# Patient Record
Sex: FEMALE | Race: WHITE | NOT HISPANIC OR LATINO | Employment: OTHER | URBAN - METROPOLITAN AREA
[De-identification: names, ages, dates, MRNs, and addresses within clinical notes are randomized per-mention and may not be internally consistent; named-entity substitution may affect disease eponyms.]

---

## 2017-01-05 ENCOUNTER — APPOINTMENT (OUTPATIENT)
Dept: LAB | Facility: MEDICAL CENTER | Age: 82
End: 2017-01-05
Payer: MEDICARE

## 2017-01-05 ENCOUNTER — TRANSCRIBE ORDERS (OUTPATIENT)
Dept: ADMINISTRATIVE | Facility: HOSPITAL | Age: 82
End: 2017-01-05

## 2017-01-05 DIAGNOSIS — C50.111 MALIGNANT NEOPLASM OF CENTRAL PORTION OF RIGHT FEMALE BREAST (HCC): Primary | ICD-10-CM

## 2017-01-05 DIAGNOSIS — C50.111 MALIGNANT NEOPLASM OF CENTRAL PORTION OF RIGHT FEMALE BREAST (HCC): ICD-10-CM

## 2017-01-05 LAB
ALBUMIN SERPL BCP-MCNC: 3.3 G/DL (ref 3.5–5)
ALP SERPL-CCNC: 84 U/L (ref 46–116)
ALT SERPL W P-5'-P-CCNC: 24 U/L (ref 12–78)
ANION GAP SERPL CALCULATED.3IONS-SCNC: 9 MMOL/L (ref 4–13)
AST SERPL W P-5'-P-CCNC: 24 U/L (ref 5–45)
BASOPHILS # BLD AUTO: 0.01 THOUSANDS/ΜL (ref 0–0.1)
BASOPHILS NFR BLD AUTO: 0 % (ref 0–1)
BILIRUB SERPL-MCNC: 0.26 MG/DL (ref 0.2–1)
BUN SERPL-MCNC: 31 MG/DL (ref 5–25)
CALCIUM SERPL-MCNC: 9.6 MG/DL (ref 8.3–10.1)
CANCER AG27-29 SERPL-ACNC: 532.1 U/ML (ref 0–42.3)
CHLORIDE SERPL-SCNC: 102 MMOL/L (ref 100–108)
CO2 SERPL-SCNC: 27 MMOL/L (ref 21–32)
CREAT SERPL-MCNC: 1.83 MG/DL (ref 0.6–1.3)
EOSINOPHIL # BLD AUTO: 0.08 THOUSAND/ΜL (ref 0–0.61)
EOSINOPHIL NFR BLD AUTO: 1 % (ref 0–6)
ERYTHROCYTE [DISTWIDTH] IN BLOOD BY AUTOMATED COUNT: 13.3 % (ref 11.6–15.1)
GFR SERPL CREATININE-BSD FRML MDRD: 26 ML/MIN/1.73SQ M
GLUCOSE SERPL-MCNC: 248 MG/DL (ref 65–140)
HCT VFR BLD AUTO: 38.8 % (ref 34.8–46.1)
HGB BLD-MCNC: 12.4 G/DL (ref 11.5–15.4)
LYMPHOCYTES # BLD AUTO: 1.59 THOUSANDS/ΜL (ref 0.6–4.47)
LYMPHOCYTES NFR BLD AUTO: 25 % (ref 14–44)
MCH RBC QN AUTO: 29.8 PG (ref 26.8–34.3)
MCHC RBC AUTO-ENTMCNC: 32 G/DL (ref 31.4–37.4)
MCV RBC AUTO: 93 FL (ref 82–98)
MONOCYTES # BLD AUTO: 0.65 THOUSAND/ΜL (ref 0.17–1.22)
MONOCYTES NFR BLD AUTO: 10 % (ref 4–12)
NEUTROPHILS # BLD AUTO: 4.12 THOUSANDS/ΜL (ref 1.85–7.62)
NEUTS SEG NFR BLD AUTO: 64 % (ref 43–75)
NRBC BLD AUTO-RTO: 0 /100 WBCS
PLATELET # BLD AUTO: 237 THOUSANDS/UL (ref 149–390)
PMV BLD AUTO: 10.2 FL (ref 8.9–12.7)
POTASSIUM SERPL-SCNC: 4.7 MMOL/L (ref 3.5–5.3)
PROT SERPL-MCNC: 7.3 G/DL (ref 6.4–8.2)
RBC # BLD AUTO: 4.16 MILLION/UL (ref 3.81–5.12)
SODIUM SERPL-SCNC: 138 MMOL/L (ref 136–145)
WBC # BLD AUTO: 6.49 THOUSAND/UL (ref 4.31–10.16)

## 2017-01-05 PROCEDURE — 36415 COLL VENOUS BLD VENIPUNCTURE: CPT

## 2017-01-05 PROCEDURE — 80053 COMPREHEN METABOLIC PANEL: CPT

## 2017-01-05 PROCEDURE — 86300 IMMUNOASSAY TUMOR CA 15-3: CPT

## 2017-01-05 PROCEDURE — 85025 COMPLETE CBC W/AUTO DIFF WBC: CPT

## 2017-01-10 ENCOUNTER — ALLSCRIPTS OFFICE VISIT (OUTPATIENT)
Dept: OTHER | Facility: OTHER | Age: 82
End: 2017-01-10

## 2017-01-10 ENCOUNTER — HOSPITAL ENCOUNTER (OUTPATIENT)
Dept: INFUSION CENTER | Facility: CLINIC | Age: 82
Discharge: HOME | End: 2017-01-10
Payer: MEDICARE

## 2017-01-10 DIAGNOSIS — C50.111 MALIGNANT NEOPLASM OF CENTRAL PORTION OF RIGHT FEMALE BREAST (HCC): ICD-10-CM

## 2017-01-10 PROCEDURE — 96401 CHEMO ANTI-NEOPL SQ/IM: CPT

## 2017-01-10 RX ADMIN — DENOSUMAB 120 MG: 120 INJECTION SUBCUTANEOUS at 14:59

## 2017-02-01 ENCOUNTER — HOSPITAL ENCOUNTER (OUTPATIENT)
Dept: RADIOLOGY | Age: 82
Discharge: HOME | End: 2017-02-01
Payer: MEDICARE

## 2017-02-01 DIAGNOSIS — C50.111 MALIGNANT NEOPLASM OF CENTRAL PORTION OF RIGHT FEMALE BREAST (HCC): ICD-10-CM

## 2017-02-01 PROCEDURE — 82948 REAGENT STRIP/BLOOD GLUCOSE: CPT

## 2017-02-01 PROCEDURE — A9552 F18 FDG: HCPCS

## 2017-02-01 PROCEDURE — 78815 PET IMAGE W/CT SKULL-THIGH: CPT

## 2017-02-01 RX ADMIN — IOHEXOL 7.5 ML: 240 INJECTION, SOLUTION INTRATHECAL; INTRAVASCULAR; INTRAVENOUS; ORAL at 08:05

## 2017-02-03 LAB — GLUCOSE SERPL-MCNC: 69 MG/DL (ref 65–140)

## 2017-02-10 ENCOUNTER — HOSPITAL ENCOUNTER (OUTPATIENT)
Dept: INFUSION CENTER | Facility: CLINIC | Age: 82
Discharge: HOME | End: 2017-02-10
Payer: MEDICARE

## 2017-02-10 PROCEDURE — 96401 CHEMO ANTI-NEOPL SQ/IM: CPT

## 2017-02-10 RX ADMIN — DENOSUMAB 120 MG: 120 INJECTION SUBCUTANEOUS at 14:52

## 2017-02-10 NOTE — PROGRESS NOTES
Xgeva given in [facility]  Ca=9 3 on 2/8/17  Pt brought in copy of lab work from [employer]  Offers no complaints   Left ambulatory in stable condition

## 2017-03-07 ENCOUNTER — ALLSCRIPTS OFFICE VISIT (OUTPATIENT)
Dept: OTHER | Facility: OTHER | Age: 82
End: 2017-03-07

## 2017-03-07 ENCOUNTER — HOSPITAL ENCOUNTER (OUTPATIENT)
Dept: INFUSION CENTER | Facility: CLINIC | Age: 82
Discharge: HOME | End: 2017-03-07
Payer: MEDICARE

## 2017-03-07 DIAGNOSIS — C50.111 MALIGNANT NEOPLASM OF CENTRAL PORTION OF RIGHT FEMALE BREAST (HCC): ICD-10-CM

## 2017-03-07 PROCEDURE — 96401 CHEMO ANTI-NEOPL SQ/IM: CPT

## 2017-03-07 RX ADMIN — DENOSUMAB 120 MG: 120 INJECTION SUBCUTANEOUS at 15:47

## 2017-03-07 NOTE — PROGRESS NOTES
Pt offers no complaints today  Ca level 9 1 from 2/22/17  Xegeva 120mg given in right arm, band aid applied   Has schedule for next injection set up, declines on AVS

## 2017-03-07 NOTE — PLAN OF CARE
Problem: Potential for Falls  Goal: Patient will remain free of falls  INTERVENTIONS:  - Assess patient frequently for physical needs  - Identify cognitive and physical deficits and behaviors that affect risk of falls    - [place] fall precautions as indicated by assessment   - Educate patient/family on patient safety including physical limitations  - Instruct patient to call for assistance with activity based on assessment  - Modify environment to reduce risk of injury  - Consider OT/PT consult to assist with strengthening/mobility   Outcome: Progressing

## 2017-03-28 ENCOUNTER — HOSPITAL ENCOUNTER (EMERGENCY)
Facility: HOSPITAL | Age: 82
Discharge: HOME | End: 2017-03-28
Attending: EMERGENCY MEDICINE | Admitting: EMERGENCY MEDICINE
Payer: MEDICARE

## 2017-03-28 ENCOUNTER — APPOINTMENT (EMERGENCY)
Dept: RADIOLOGY | Facility: HOSPITAL | Age: 82
End: 2017-03-28
Payer: MEDICARE

## 2017-03-28 ENCOUNTER — APPOINTMENT (EMERGENCY)
Dept: CT IMAGING | Facility: HOSPITAL | Age: 82
End: 2017-03-28
Payer: MEDICARE

## 2017-03-28 VITALS
TEMPERATURE: 97.5 F | OXYGEN SATURATION: 100 % | WEIGHT: 184.97 LBS | DIASTOLIC BLOOD PRESSURE: 90 MMHG | HEART RATE: 94 BPM | SYSTOLIC BLOOD PRESSURE: 192 MMHG | RESPIRATION RATE: 16 BRPM | BODY MASS INDEX: 34.95 KG/M2

## 2017-03-28 DIAGNOSIS — R79.89 RAISED TSH LEVEL: ICD-10-CM

## 2017-03-28 DIAGNOSIS — N39.0 UTI (URINARY TRACT INFECTION): ICD-10-CM

## 2017-03-28 DIAGNOSIS — E16.2 HYPOGLYCEMIA: Primary | ICD-10-CM

## 2017-03-28 LAB
ALBUMIN SERPL BCP-MCNC: 3.3 G/DL (ref 3.5–5)
ALP SERPL-CCNC: 98 U/L (ref 46–116)
ALT SERPL W P-5'-P-CCNC: 33 U/L (ref 12–78)
ANION GAP SERPL CALCULATED.3IONS-SCNC: 11 MMOL/L (ref 4–13)
AST SERPL W P-5'-P-CCNC: 72 U/L (ref 5–45)
ATRIAL RATE: 92 BPM
BACTERIA UR QL AUTO: ABNORMAL /HPF
BASOPHILS # BLD AUTO: 0.02 THOUSANDS/ΜL (ref 0–0.1)
BASOPHILS NFR BLD AUTO: 0 % (ref 0–1)
BILIRUB SERPL-MCNC: 0.2 MG/DL (ref 0.2–1)
BILIRUB UR QL STRIP: NEGATIVE
BUN SERPL-MCNC: 31 MG/DL (ref 5–25)
CALCIUM SERPL-MCNC: 9.3 MG/DL (ref 8.3–10.1)
CHLORIDE SERPL-SCNC: 103 MMOL/L (ref 100–108)
CLARITY UR: ABNORMAL
CO2 SERPL-SCNC: 27 MMOL/L (ref 21–32)
COLOR UR: YELLOW
CREAT SERPL-MCNC: 1.87 MG/DL (ref 0.6–1.3)
EOSINOPHIL # BLD AUTO: 0.08 THOUSAND/ΜL (ref 0–0.61)
EOSINOPHIL NFR BLD AUTO: 1 % (ref 0–6)
ERYTHROCYTE [DISTWIDTH] IN BLOOD BY AUTOMATED COUNT: 13.8 % (ref 11.6–15.1)
GFR SERPL CREATININE-BSD FRML MDRD: 25.4 ML/MIN/1.73SQ M
GLUCOSE SERPL-MCNC: 152 MG/DL (ref 65–140)
GLUCOSE SERPL-MCNC: 156 MG/DL (ref 65–140)
GLUCOSE SERPL-MCNC: 166 MG/DL (ref 65–140)
GLUCOSE UR STRIP-MCNC: NEGATIVE MG/DL
HCT VFR BLD AUTO: 38.1 % (ref 34.8–46.1)
HGB BLD-MCNC: 12 G/DL (ref 11.5–15.4)
HGB UR QL STRIP.AUTO: ABNORMAL
KETONES UR STRIP-MCNC: NEGATIVE MG/DL
LACTATE SERPL-SCNC: 1.4 MMOL/L (ref 0.5–2)
LEUKOCYTE ESTERASE UR QL STRIP: NEGATIVE
LYMPHOCYTES # BLD AUTO: 1.12 THOUSANDS/ΜL (ref 0.6–4.47)
LYMPHOCYTES NFR BLD AUTO: 12 % (ref 14–44)
MCH RBC QN AUTO: 29.3 PG (ref 26.8–34.3)
MCHC RBC AUTO-ENTMCNC: 31.5 G/DL (ref 31.4–37.4)
MCV RBC AUTO: 93 FL (ref 82–98)
MONOCYTES # BLD AUTO: 0.76 THOUSAND/ΜL (ref 0.17–1.22)
MONOCYTES NFR BLD AUTO: 8 % (ref 4–12)
NEUTROPHILS # BLD AUTO: 7.28 THOUSANDS/ΜL (ref 1.85–7.62)
NEUTS SEG NFR BLD AUTO: 79 % (ref 43–75)
NITRITE UR QL STRIP: NEGATIVE
NON-SQ EPI CELLS URNS QL MICRO: ABNORMAL /HPF
P AXIS: 61 DEGREES
PH UR STRIP.AUTO: 6.5 [PH] (ref 4.5–8)
PLATELET # BLD AUTO: 212 THOUSANDS/UL (ref 149–390)
PMV BLD AUTO: 9 FL (ref 8.9–12.7)
POTASSIUM SERPL-SCNC: 4.1 MMOL/L (ref 3.5–5.3)
PR INTERVAL: 148 MS
PROT SERPL-MCNC: 7.5 G/DL (ref 6.4–8.2)
PROT UR STRIP-MCNC: ABNORMAL MG/DL
QRS AXIS: -17 DEGREES
QRSD INTERVAL: 74 MS
QT INTERVAL: 348 MS
QTC INTERVAL: 430 MS
RBC # BLD AUTO: 4.1 MILLION/UL (ref 3.81–5.12)
RBC #/AREA URNS AUTO: ABNORMAL /HPF
SODIUM SERPL-SCNC: 141 MMOL/L (ref 136–145)
SP GR UR STRIP.AUTO: 1.01 (ref 1–1.03)
T WAVE AXIS: 29 DEGREES
TROPONIN I SERPL-MCNC: <0.02 NG/ML
TSH SERPL DL<=0.05 MIU/L-ACNC: 5.93 UIU/ML (ref 0.36–3.74)
UROBILINOGEN UR QL STRIP.AUTO: 0.2 E.U./DL
VENTRICULAR RATE: 92 BPM
WBC # BLD AUTO: 9.26 THOUSAND/UL (ref 4.31–10.16)
WBC #/AREA URNS AUTO: ABNORMAL /HPF

## 2017-03-28 PROCEDURE — 93005 ELECTROCARDIOGRAM TRACING: CPT | Performed by: EMERGENCY MEDICINE

## 2017-03-28 PROCEDURE — 96361 HYDRATE IV INFUSION ADD-ON: CPT

## 2017-03-28 PROCEDURE — 71010 HB CHEST X-RAY 1 VIEW FRONTAL (PORTABLE): CPT

## 2017-03-28 PROCEDURE — 84484 ASSAY OF TROPONIN QUANT: CPT | Performed by: EMERGENCY MEDICINE

## 2017-03-28 PROCEDURE — 81001 URINALYSIS AUTO W/SCOPE: CPT | Performed by: EMERGENCY MEDICINE

## 2017-03-28 PROCEDURE — 99285 EMERGENCY DEPT VISIT HI MDM: CPT

## 2017-03-28 PROCEDURE — 70450 CT HEAD/BRAIN W/O DYE: CPT

## 2017-03-28 PROCEDURE — 36415 COLL VENOUS BLD VENIPUNCTURE: CPT | Performed by: EMERGENCY MEDICINE

## 2017-03-28 PROCEDURE — 82948 REAGENT STRIP/BLOOD GLUCOSE: CPT

## 2017-03-28 PROCEDURE — 96360 HYDRATION IV INFUSION INIT: CPT

## 2017-03-28 PROCEDURE — 87086 URINE CULTURE/COLONY COUNT: CPT | Performed by: EMERGENCY MEDICINE

## 2017-03-28 PROCEDURE — 80053 COMPREHEN METABOLIC PANEL: CPT | Performed by: EMERGENCY MEDICINE

## 2017-03-28 PROCEDURE — 83605 ASSAY OF LACTIC ACID: CPT | Performed by: EMERGENCY MEDICINE

## 2017-03-28 PROCEDURE — 84443 ASSAY THYROID STIM HORMONE: CPT | Performed by: EMERGENCY MEDICINE

## 2017-03-28 PROCEDURE — 85025 COMPLETE CBC W/AUTO DIFF WBC: CPT | Performed by: EMERGENCY MEDICINE

## 2017-03-28 RX ORDER — SULFAMETHOXAZOLE AND TRIMETHOPRIM 800; 160 MG/1; MG/1
1 TABLET ORAL ONCE
Status: COMPLETED | OUTPATIENT
Start: 2017-03-28 | End: 2017-03-28

## 2017-03-28 RX ORDER — ONDANSETRON 4 MG/1
4 TABLET, FILM COATED ORAL EVERY 8 HOURS PRN
Qty: 15 TABLET | Refills: 0 | Status: SHIPPED | OUTPATIENT
Start: 2017-03-28 | End: 2018-03-18

## 2017-03-28 RX ORDER — SULFAMETHOXAZOLE AND TRIMETHOPRIM 800; 160 MG/1; MG/1
1 TABLET ORAL 2 TIMES DAILY
Qty: 14 TABLET | Refills: 0 | Status: SHIPPED | OUTPATIENT
Start: 2017-03-28 | End: 2017-04-04

## 2017-03-28 RX ORDER — DEXTROSE AND SODIUM CHLORIDE 5; .45 G/100ML; G/100ML
125 INJECTION, SOLUTION INTRAVENOUS CONTINUOUS
Status: DISCONTINUED | OUTPATIENT
Start: 2017-03-28 | End: 2017-03-28 | Stop reason: HOSPADM

## 2017-03-28 RX ADMIN — DEXTROSE AND SODIUM CHLORIDE 125 ML/HR: 5; .45 INJECTION, SOLUTION INTRAVENOUS at 06:50

## 2017-03-28 RX ADMIN — SULFAMETHOXAZOLE AND TRIMETHOPRIM 1 TABLET: 800; 160 TABLET ORAL at 08:34

## 2017-03-30 ENCOUNTER — HOSPITAL ENCOUNTER (EMERGENCY)
Facility: HOSPITAL | Age: 82
Discharge: HOME | End: 2017-03-30
Attending: EMERGENCY MEDICINE | Admitting: EMERGENCY MEDICINE
Payer: MEDICARE

## 2017-03-30 VITALS
SYSTOLIC BLOOD PRESSURE: 142 MMHG | RESPIRATION RATE: 16 BRPM | OXYGEN SATURATION: 97 % | HEART RATE: 78 BPM | WEIGHT: 186.51 LBS | BODY MASS INDEX: 35.24 KG/M2 | DIASTOLIC BLOOD PRESSURE: 98 MMHG | TEMPERATURE: 97.4 F

## 2017-03-30 DIAGNOSIS — E16.2 HYPOGLYCEMIA: Primary | ICD-10-CM

## 2017-03-30 LAB
ANION GAP SERPL CALCULATED.3IONS-SCNC: 10 MMOL/L (ref 4–13)
BACTERIA UR CULT: NORMAL
BASOPHILS # BLD MANUAL: 0 THOUSAND/UL (ref 0–0.1)
BASOPHILS NFR MAR MANUAL: 0 % (ref 0–1)
BUN SERPL-MCNC: 31 MG/DL (ref 5–25)
CALCIUM SERPL-MCNC: 9 MG/DL (ref 8.3–10.1)
CHLORIDE SERPL-SCNC: 103 MMOL/L (ref 100–108)
CO2 SERPL-SCNC: 28 MMOL/L (ref 21–32)
CREAT SERPL-MCNC: 2.44 MG/DL (ref 0.6–1.3)
EOSINOPHIL # BLD MANUAL: 0.23 THOUSAND/UL (ref 0–0.4)
EOSINOPHIL NFR BLD MANUAL: 4 % (ref 0–6)
ERYTHROCYTE [DISTWIDTH] IN BLOOD BY AUTOMATED COUNT: 13.9 % (ref 11.6–15.1)
GFR SERPL CREATININE-BSD FRML MDRD: 18.6 ML/MIN/1.73SQ M
GLUCOSE SERPL-MCNC: 102 MG/DL (ref 65–140)
GLUCOSE SERPL-MCNC: 125 MG/DL (ref 65–140)
GLUCOSE SERPL-MCNC: 138 MG/DL (ref 65–140)
GLUCOSE SERPL-MCNC: 179 MG/DL (ref 65–140)
HCT VFR BLD AUTO: 38 % (ref 34.8–46.1)
HGB BLD-MCNC: 12 G/DL (ref 11.5–15.4)
LYMPHOCYTES # BLD AUTO: 1.04 THOUSAND/UL (ref 0.6–4.47)
LYMPHOCYTES # BLD AUTO: 18 % (ref 14–44)
MCH RBC QN AUTO: 29.3 PG (ref 26.8–34.3)
MCHC RBC AUTO-ENTMCNC: 31.6 G/DL (ref 31.4–37.4)
MCV RBC AUTO: 93 FL (ref 82–98)
MONOCYTES # BLD AUTO: 0.63 THOUSAND/UL (ref 0–1.22)
MONOCYTES NFR BLD: 11 % (ref 4–12)
NEUTROPHILS # BLD MANUAL: 3.68 THOUSAND/UL (ref 1.85–7.62)
NEUTS BAND NFR BLD MANUAL: 2 % (ref 0–8)
NEUTS SEG NFR BLD AUTO: 62 % (ref 43–75)
PLATELET # BLD AUTO: 200 THOUSANDS/UL (ref 149–390)
PLATELET BLD QL SMEAR: ADEQUATE
PMV BLD AUTO: 8.7 FL (ref 8.9–12.7)
POTASSIUM SERPL-SCNC: 4.3 MMOL/L (ref 3.5–5.3)
RBC # BLD AUTO: 4.1 MILLION/UL (ref 3.81–5.12)
SODIUM SERPL-SCNC: 141 MMOL/L (ref 136–145)
TOTAL CELLS COUNTED SPEC: 100
VARIANT LYMPHS # BLD AUTO: 3 %
WBC # BLD AUTO: 5.75 THOUSAND/UL (ref 4.31–10.16)

## 2017-03-30 PROCEDURE — 85007 BL SMEAR W/DIFF WBC COUNT: CPT | Performed by: EMERGENCY MEDICINE

## 2017-03-30 PROCEDURE — 99285 EMERGENCY DEPT VISIT HI MDM: CPT

## 2017-03-30 PROCEDURE — 85027 COMPLETE CBC AUTOMATED: CPT | Performed by: EMERGENCY MEDICINE

## 2017-03-30 PROCEDURE — 96361 HYDRATE IV INFUSION ADD-ON: CPT

## 2017-03-30 PROCEDURE — 96360 HYDRATION IV INFUSION INIT: CPT

## 2017-03-30 PROCEDURE — 82948 REAGENT STRIP/BLOOD GLUCOSE: CPT

## 2017-03-30 PROCEDURE — 36415 COLL VENOUS BLD VENIPUNCTURE: CPT | Performed by: EMERGENCY MEDICINE

## 2017-03-30 PROCEDURE — 80048 BASIC METABOLIC PNL TOTAL CA: CPT | Performed by: EMERGENCY MEDICINE

## 2017-03-30 RX ORDER — LETROZOLE 2.5 MG/1
2.5 TABLET, FILM COATED ORAL DAILY
COMMUNITY
End: 2018-03-18

## 2017-03-30 RX ORDER — AMLODIPINE BESYLATE 2.5 MG/1
2.5 TABLET ORAL ONCE
Status: COMPLETED | OUTPATIENT
Start: 2017-03-30 | End: 2017-03-30

## 2017-03-30 RX ORDER — SULFAMETHOXAZOLE AND TRIMETHOPRIM 800; 160 MG/1; MG/1
1 TABLET ORAL ONCE
Status: COMPLETED | OUTPATIENT
Start: 2017-03-30 | End: 2017-03-30

## 2017-03-30 RX ORDER — FERROUS SULFATE 325(65) MG
325 TABLET ORAL
COMMUNITY
End: 2018-03-18

## 2017-03-30 RX ORDER — INSULIN GLARGINE 100 [IU]/ML
50 INJECTION, SOLUTION SUBCUTANEOUS
Status: ON HOLD | COMMUNITY
End: 2018-03-20

## 2017-03-30 RX ADMIN — SULFAMETHOXAZOLE AND TRIMETHOPRIM 1 TABLET: 800; 160 TABLET ORAL at 09:30

## 2017-03-30 RX ADMIN — SODIUM CHLORIDE 1000 ML: 0.9 INJECTION, SOLUTION INTRAVENOUS at 08:52

## 2017-03-30 RX ADMIN — AMLODIPINE BESYLATE 2.5 MG: 2.5 TABLET ORAL at 09:30

## 2017-04-04 ENCOUNTER — HOSPITAL ENCOUNTER (OUTPATIENT)
Dept: INFUSION CENTER | Facility: CLINIC | Age: 82
Discharge: HOME | End: 2017-04-04
Payer: MEDICARE

## 2017-04-04 PROCEDURE — 96401 CHEMO ANTI-NEOPL SQ/IM: CPT

## 2017-04-04 RX ADMIN — DENOSUMAB 120 MG: 120 INJECTION SUBCUTANEOUS at 15:10

## 2017-04-04 NOTE — PLAN OF CARE
Problem: Potential for Falls  Goal: Patient will remain free of falls  INTERVENTIONS:  - Assess patient frequently for physical needs  - Identify cognitive and physical deficits and behaviors that affect risk of falls    - [place] fall precautions as indicated by assessment   - Educate patient/family on patient safety including physical limitations  - Instruct patient to call for assistance with activity based on assessment  - Modify environment to reduce risk of injury  - Consider OT/PT consult to assist with strengthening/mobility   Outcome: Progressing

## 2017-04-04 NOTE — PROGRESS NOTES
Pt  Tolerated Xgeva Sq injection in EDDIE w/out adverse reaction  Confirmed pts  Next appt   Pt  Declined AVS

## 2017-05-02 ENCOUNTER — ALLSCRIPTS OFFICE VISIT (OUTPATIENT)
Dept: OTHER | Facility: OTHER | Age: 82
End: 2017-05-02

## 2017-05-02 ENCOUNTER — APPOINTMENT (OUTPATIENT)
Dept: LAB | Facility: MEDICAL CENTER | Age: 82
End: 2017-05-02
Payer: MEDICARE

## 2017-05-02 ENCOUNTER — HOSPITAL ENCOUNTER (OUTPATIENT)
Dept: INFUSION CENTER | Facility: CLINIC | Age: 82
Discharge: HOME | End: 2017-05-02
Payer: MEDICARE

## 2017-05-02 DIAGNOSIS — C79.51 SECONDARY MALIGNANT NEOPLASM OF BONE (HCC): ICD-10-CM

## 2017-05-02 DIAGNOSIS — C50.111 MALIGNANT NEOPLASM OF CENTRAL PORTION OF RIGHT FEMALE BREAST (HCC): ICD-10-CM

## 2017-05-02 PROCEDURE — 86300 IMMUNOASSAY TUMOR CA 15-3: CPT

## 2017-05-02 PROCEDURE — 96401 CHEMO ANTI-NEOPL SQ/IM: CPT

## 2017-05-02 PROCEDURE — 36415 COLL VENOUS BLD VENIPUNCTURE: CPT

## 2017-05-02 RX ADMIN — DENOSUMAB 120 MG: 120 INJECTION SUBCUTANEOUS at 15:04

## 2017-05-02 NOTE — PROGRESS NOTES
Pt offers no complaints, labs within parameters for xgeva today, verified pt taking po calcium at home  Xgeav tolerated in left arm, pt to return as scheduled    Declined AVS

## 2017-05-03 LAB — CANCER AG27-29 SERPL-ACNC: >450 U/ML (ref 0–42.3)

## 2017-05-30 ENCOUNTER — HOSPITAL ENCOUNTER (OUTPATIENT)
Dept: INFUSION CENTER | Facility: CLINIC | Age: 82
Discharge: HOME | End: 2017-05-30
Payer: MEDICARE

## 2017-05-30 PROCEDURE — 96401 CHEMO ANTI-NEOPL SQ/IM: CPT

## 2017-05-30 RX ADMIN — DENOSUMAB 120 MG: 120 INJECTION SUBCUTANEOUS at 15:27

## 2017-05-30 NOTE — PROGRESS NOTES
Pt  Denies new symptoms or concerns at this time  Pt  States she had labs completed last Wednesday  Received Labs from [facility]  Calcium 9 1  Xgeva ordered today

## 2017-05-30 NOTE — PLAN OF CARE
Problem: PAIN - ADULT  Goal: Verbalizes/displays adequate comfort level or baseline comfort level  Interventions:  - Encourage patient to monitor pain and request assistance  - Assess pain using appropriate pain scale  - Administer analgesics based on type and severity of pain and evaluate response  - Implement non-pharmacological measures as appropriate and evaluate response  - Consider cultural and social influences on pain and pain management  - Notify physician/advanced practitioner if interventions unsuccessful or patient reports new pain  Outcome: Progressing    Problem: INFECTION - ADULT  Goal: Absence or prevention of progression during hospitalization  INTERVENTIONS:  - Assess and monitor for signs and symptoms of infection  - Monitor lab/diagnostic results  - Monitor all insertion sites, i e  indwelling lines, tubes, and drains  - Monitor endotracheal (as able) and nasal secretions for changes in amount and color  - [place] appropriate cooling/warming therapies per order  - Administer medications as ordered  - Instruct and encourage patient and family to use good hand hygiene technique  - Identify and instruct in appropriate isolation precautions for identified infection/condition  Outcome: Progressing  Goal: Absence of fever/infection during neutropenic period  INTERVENTIONS:  - Monitor WBC  - Implement neutropenic guidelines  Outcome: Progressing    Problem: Knowledge Deficit  Goal: Patient/family/caregiver demonstrates understanding of disease process, treatment plan, medications, and discharge instructions  Complete learning assessment and assess knowledge base    Interventions:  - Provide teaching at level of understanding  - Provide teaching via preferred learning methods  Outcome: Progressing

## 2017-05-31 ENCOUNTER — HOSPITAL ENCOUNTER (OUTPATIENT)
Dept: RADIOLOGY | Age: 82
Discharge: HOME | End: 2017-05-31
Payer: MEDICARE

## 2017-05-31 DIAGNOSIS — C50.111 MALIGNANT NEOPLASM OF CENTRAL PORTION OF RIGHT FEMALE BREAST (HCC): ICD-10-CM

## 2017-05-31 DIAGNOSIS — C79.51 SECONDARY MALIGNANT NEOPLASM OF BONE (HCC): ICD-10-CM

## 2017-05-31 LAB
GLUCOSE SERPL-MCNC: 74 MG/DL (ref 65–140)
GLUCOSE SERPL-MCNC: 87 MG/DL (ref 65–140)

## 2017-05-31 PROCEDURE — 82948 REAGENT STRIP/BLOOD GLUCOSE: CPT

## 2017-05-31 PROCEDURE — 78815 PET IMAGE W/CT SKULL-THIGH: CPT

## 2017-05-31 PROCEDURE — A9552 F18 FDG: HCPCS

## 2017-06-08 ENCOUNTER — ALLSCRIPTS OFFICE VISIT (OUTPATIENT)
Dept: OTHER | Facility: OTHER | Age: 82
End: 2017-06-08

## 2017-06-08 DIAGNOSIS — S89.90XA INJURY OF LOWER LEG: ICD-10-CM

## 2017-06-08 DIAGNOSIS — D64.9 ANEMIA: ICD-10-CM

## 2017-06-08 DIAGNOSIS — C78.7 SECONDARY MALIGNANT NEOPLASM OF LIVER AND INTRAHEPATIC BILE DUCT (HCC): ICD-10-CM

## 2017-06-08 DIAGNOSIS — D53.9 NUTRITIONAL ANEMIA: ICD-10-CM

## 2017-06-08 DIAGNOSIS — C50.111 MALIGNANT NEOPLASM OF CENTRAL PORTION OF RIGHT FEMALE BREAST (HCC): ICD-10-CM

## 2017-06-09 ENCOUNTER — APPOINTMENT (OUTPATIENT)
Dept: LAB | Facility: MEDICAL CENTER | Age: 82
End: 2017-06-09
Payer: MEDICARE

## 2017-06-09 DIAGNOSIS — C79.51 SECONDARY MALIGNANT NEOPLASM OF BONE (HCC): ICD-10-CM

## 2017-06-09 DIAGNOSIS — C50.111 MALIGNANT NEOPLASM OF CENTRAL PORTION OF RIGHT FEMALE BREAST (HCC): ICD-10-CM

## 2017-06-09 LAB
ANISOCYTOSIS BLD QL SMEAR: PRESENT
BASOPHILS # BLD MANUAL: 0 THOUSAND/UL (ref 0–0.1)
BASOPHILS NFR MAR MANUAL: 0 % (ref 0–1)
EOSINOPHIL # BLD MANUAL: 0.2 THOUSAND/UL (ref 0–0.4)
EOSINOPHIL NFR BLD MANUAL: 3 % (ref 0–6)
ERYTHROCYTE [DISTWIDTH] IN BLOOD BY AUTOMATED COUNT: 16.1 % (ref 11.6–15.1)
HCT VFR BLD AUTO: 31.5 % (ref 34.8–46.1)
HGB BLD-MCNC: 9.9 G/DL (ref 11.5–15.4)
LYMPHOCYTES # BLD AUTO: 1.57 THOUSAND/UL (ref 0.6–4.47)
LYMPHOCYTES # BLD AUTO: 23 % (ref 14–44)
MCH RBC QN AUTO: 28.9 PG (ref 26.8–34.3)
MCHC RBC AUTO-ENTMCNC: 31.4 G/DL (ref 31.4–37.4)
MCV RBC AUTO: 92 FL (ref 82–98)
METAMYELOCYTES NFR BLD MANUAL: 5 % (ref 0–1)
MONOCYTES # BLD AUTO: 0.34 THOUSAND/UL (ref 0–1.22)
MONOCYTES NFR BLD: 5 % (ref 4–12)
NEUTROPHILS # BLD MANUAL: 4.36 THOUSAND/UL (ref 1.85–7.62)
NEUTS BAND NFR BLD MANUAL: 2 % (ref 0–8)
NEUTS SEG NFR BLD AUTO: 62 % (ref 43–75)
NRBC BLD AUTO-RTO: 2 /100 WBCS
PLATELET # BLD AUTO: 223 THOUSANDS/UL (ref 149–390)
PLATELET BLD QL SMEAR: ADEQUATE
PMV BLD AUTO: 9.7 FL (ref 8.9–12.7)
RBC # BLD AUTO: 3.42 MILLION/UL (ref 3.81–5.12)
RBC MORPH BLD: PRESENT
WBC # BLD AUTO: 6.81 THOUSAND/UL (ref 4.31–10.16)

## 2017-06-09 PROCEDURE — 36415 COLL VENOUS BLD VENIPUNCTURE: CPT

## 2017-06-09 PROCEDURE — 85007 BL SMEAR W/DIFF WBC COUNT: CPT

## 2017-06-09 PROCEDURE — 85027 COMPLETE CBC AUTOMATED: CPT

## 2017-06-12 RX ORDER — LAMOTRIGINE 25 MG/1
250 TABLET ORAL ONCE
Status: COMPLETED | OUTPATIENT
Start: 2017-06-13 | End: 2017-06-13

## 2017-06-13 ENCOUNTER — HOSPITAL ENCOUNTER (OUTPATIENT)
Dept: INFUSION CENTER | Facility: CLINIC | Age: 82
Discharge: HOME | End: 2017-06-13
Payer: MEDICARE

## 2017-06-13 ENCOUNTER — GENERIC CONVERSION - ENCOUNTER (OUTPATIENT)
Dept: OTHER | Facility: OTHER | Age: 82
End: 2017-06-13

## 2017-06-13 VITALS
DIASTOLIC BLOOD PRESSURE: 58 MMHG | TEMPERATURE: 98.7 F | SYSTOLIC BLOOD PRESSURE: 120 MMHG | RESPIRATION RATE: 19 BRPM | HEART RATE: 89 BPM

## 2017-06-13 PROCEDURE — 96402 CHEMO HORMON ANTINEOPL SQ/IM: CPT

## 2017-06-13 RX ADMIN — FULVESTRANT 250 MG: 50 INJECTION INTRAMUSCULAR at 12:08

## 2017-06-13 NOTE — PROGRESS NOTES
Pt resting with no complaints, vitals stable, Injections given as ordered   Declined AVS and aware of next appointment

## 2017-06-23 ENCOUNTER — APPOINTMENT (OUTPATIENT)
Dept: LAB | Facility: MEDICAL CENTER | Age: 82
End: 2017-06-23
Payer: MEDICARE

## 2017-06-23 DIAGNOSIS — C78.7 SECONDARY MALIGNANT NEOPLASM OF LIVER AND INTRAHEPATIC BILE DUCT (HCC): ICD-10-CM

## 2017-06-23 DIAGNOSIS — C50.111 MALIGNANT NEOPLASM OF CENTRAL PORTION OF RIGHT FEMALE BREAST (HCC): ICD-10-CM

## 2017-06-23 DIAGNOSIS — D64.9 ANEMIA, UNSPECIFIED: Primary | ICD-10-CM

## 2017-06-23 LAB
ALBUMIN SERPL BCP-MCNC: 3.4 G/DL (ref 3.5–5)
ALP SERPL-CCNC: 134 U/L (ref 46–116)
ALT SERPL W P-5'-P-CCNC: 26 U/L (ref 12–78)
ANION GAP SERPL CALCULATED.3IONS-SCNC: 10 MMOL/L (ref 4–13)
AST SERPL W P-5'-P-CCNC: 65 U/L (ref 5–45)
BASOPHILS # BLD AUTO: 0.01 THOUSANDS/ΜL (ref 0–0.1)
BASOPHILS NFR BLD AUTO: 0 % (ref 0–1)
BILIRUB SERPL-MCNC: 0.53 MG/DL (ref 0.2–1)
BUN SERPL-MCNC: 35 MG/DL (ref 5–25)
CALCIUM SERPL-MCNC: 9.1 MG/DL (ref 8.3–10.1)
CANCER AG27-29 SERPL-ACNC: 2577.4 U/ML (ref 0–42.3)
CHLORIDE SERPL-SCNC: 98 MMOL/L (ref 100–108)
CO2 SERPL-SCNC: 25 MMOL/L (ref 21–32)
CREAT SERPL-MCNC: 2.46 MG/DL (ref 0.6–1.3)
EOSINOPHIL # BLD AUTO: 0.09 THOUSAND/ΜL (ref 0–0.61)
EOSINOPHIL NFR BLD AUTO: 2 % (ref 0–6)
ERYTHROCYTE [DISTWIDTH] IN BLOOD BY AUTOMATED COUNT: 16.2 % (ref 11.6–15.1)
FERRITIN SERPL-MCNC: 2599 NG/ML (ref 8–388)
FOLATE SERPL-MCNC: 13.6 NG/ML (ref 3.1–17.5)
GFR SERPL CREATININE-BSD FRML MDRD: 18.5 ML/MIN/1.73SQ M
GLUCOSE SERPL-MCNC: 424 MG/DL (ref 65–140)
HCT VFR BLD AUTO: 28.9 % (ref 34.8–46.1)
HGB BLD-MCNC: 9.2 G/DL (ref 11.5–15.4)
IRON SATN MFR SERPL: 43 %
IRON SERPL-MCNC: 99 UG/DL (ref 50–170)
LYMPHOCYTES # BLD AUTO: 0.97 THOUSANDS/ΜL (ref 0.6–4.47)
LYMPHOCYTES NFR BLD AUTO: 22 % (ref 14–44)
MCH RBC QN AUTO: 29.6 PG (ref 26.8–34.3)
MCHC RBC AUTO-ENTMCNC: 31.8 G/DL (ref 31.4–37.4)
MCV RBC AUTO: 93 FL (ref 82–98)
MONOCYTES # BLD AUTO: 0.15 THOUSAND/ΜL (ref 0.17–1.22)
MONOCYTES NFR BLD AUTO: 3 % (ref 4–12)
NEUTROPHILS # BLD AUTO: 3.26 THOUSANDS/ΜL (ref 1.85–7.62)
NEUTS SEG NFR BLD AUTO: 73 % (ref 43–75)
NRBC BLD AUTO-RTO: 0 /100 WBCS
PLATELET # BLD AUTO: 204 THOUSANDS/UL (ref 149–390)
PMV BLD AUTO: 9 FL (ref 8.9–12.7)
POTASSIUM SERPL-SCNC: 4.5 MMOL/L (ref 3.5–5.3)
PROT SERPL-MCNC: 6.9 G/DL (ref 6.4–8.2)
RBC # BLD AUTO: 3.11 MILLION/UL (ref 3.81–5.12)
SODIUM SERPL-SCNC: 133 MMOL/L (ref 136–145)
TIBC SERPL-MCNC: 229 UG/DL (ref 250–450)
WBC # BLD AUTO: 4.5 THOUSAND/UL (ref 4.31–10.16)

## 2017-06-23 PROCEDURE — 36415 COLL VENOUS BLD VENIPUNCTURE: CPT

## 2017-06-23 PROCEDURE — 82728 ASSAY OF FERRITIN: CPT

## 2017-06-23 PROCEDURE — 85025 COMPLETE CBC W/AUTO DIFF WBC: CPT

## 2017-06-23 PROCEDURE — 82746 ASSAY OF FOLIC ACID SERUM: CPT

## 2017-06-23 PROCEDURE — 83550 IRON BINDING TEST: CPT

## 2017-06-23 PROCEDURE — 86300 IMMUNOASSAY TUMOR CA 15-3: CPT

## 2017-06-23 PROCEDURE — 83918 ORGANIC ACIDS TOTAL QUANT: CPT

## 2017-06-23 PROCEDURE — 80053 COMPREHEN METABOLIC PANEL: CPT

## 2017-06-23 PROCEDURE — 83540 ASSAY OF IRON: CPT

## 2017-06-24 RX ORDER — LAMOTRIGINE 25 MG/1
250 TABLET ORAL ONCE
Status: COMPLETED | OUTPATIENT
Start: 2017-06-27 | End: 2017-06-27

## 2017-06-25 ENCOUNTER — OFFICE VISIT (OUTPATIENT)
Dept: URGENT CARE | Facility: MEDICAL CENTER | Age: 82
End: 2017-06-25
Payer: MEDICARE

## 2017-06-25 ENCOUNTER — APPOINTMENT (OUTPATIENT)
Dept: RADIOLOGY | Facility: MEDICAL CENTER | Age: 82
End: 2017-06-25
Attending: PHYSICIAN ASSISTANT
Payer: MEDICARE

## 2017-06-25 DIAGNOSIS — S89.90XA INJURY OF LOWER LEG: ICD-10-CM

## 2017-06-25 PROCEDURE — G0463 HOSPITAL OUTPT CLINIC VISIT: HCPCS

## 2017-06-25 PROCEDURE — 99203 OFFICE O/P NEW LOW 30 MIN: CPT

## 2017-06-25 PROCEDURE — 73590 X-RAY EXAM OF LOWER LEG: CPT

## 2017-06-27 ENCOUNTER — ALLSCRIPTS OFFICE VISIT (OUTPATIENT)
Dept: OTHER | Facility: OTHER | Age: 82
End: 2017-06-27

## 2017-06-27 ENCOUNTER — HOSPITAL ENCOUNTER (OUTPATIENT)
Dept: INFUSION CENTER | Facility: CLINIC | Age: 82
Discharge: HOME | End: 2017-06-27
Payer: MEDICARE

## 2017-06-27 PROCEDURE — 96402 CHEMO HORMON ANTINEOPL SQ/IM: CPT

## 2017-06-27 PROCEDURE — 96401 CHEMO ANTI-NEOPL SQ/IM: CPT

## 2017-06-27 RX ADMIN — DENOSUMAB 120 MG: 120 INJECTION SUBCUTANEOUS at 15:01

## 2017-06-27 RX ADMIN — FULVESTRANT 250 MG: 50 INJECTION INTRAMUSCULAR at 15:05

## 2017-06-28 LAB — METHYLMALONATE SERPL-SCNC: 400 NMOL/L (ref 0–378)

## 2017-06-29 ENCOUNTER — GENERIC CONVERSION - ENCOUNTER (OUTPATIENT)
Dept: OTHER | Facility: OTHER | Age: 82
End: 2017-06-29

## 2017-07-02 DIAGNOSIS — C78.7 SECONDARY MALIGNANT NEOPLASM OF LIVER AND INTRAHEPATIC BILE DUCT (HCC): ICD-10-CM

## 2017-07-02 DIAGNOSIS — C79.51 SECONDARY MALIGNANT NEOPLASM OF BONE (HCC): ICD-10-CM

## 2017-07-02 DIAGNOSIS — C50.111 MALIGNANT NEOPLASM OF CENTRAL PORTION OF RIGHT FEMALE BREAST (HCC): ICD-10-CM

## 2017-07-07 ENCOUNTER — APPOINTMENT (OUTPATIENT)
Dept: LAB | Facility: MEDICAL CENTER | Age: 82
End: 2017-07-07
Payer: MEDICARE

## 2017-07-07 DIAGNOSIS — C78.7 SECONDARY MALIGNANT NEOPLASM OF LIVER AND INTRAHEPATIC BILE DUCT (HCC): ICD-10-CM

## 2017-07-07 LAB
BASOPHILS # BLD AUTO: 0.01 THOUSANDS/ΜL (ref 0–0.1)
BASOPHILS NFR BLD AUTO: 0 % (ref 0–1)
EOSINOPHIL # BLD AUTO: 0.03 THOUSAND/ΜL (ref 0–0.61)
EOSINOPHIL NFR BLD AUTO: 1 % (ref 0–6)
ERYTHROCYTE [DISTWIDTH] IN BLOOD BY AUTOMATED COUNT: 18.5 % (ref 11.6–15.1)
HCT VFR BLD AUTO: 29.1 % (ref 34.8–46.1)
HGB BLD-MCNC: 9.1 G/DL (ref 11.5–15.4)
LYMPHOCYTES # BLD AUTO: 1.31 THOUSANDS/ΜL (ref 0.6–4.47)
LYMPHOCYTES NFR BLD AUTO: 41 % (ref 14–44)
MCH RBC QN AUTO: 29.8 PG (ref 26.8–34.3)
MCHC RBC AUTO-ENTMCNC: 31.3 G/DL (ref 31.4–37.4)
MCV RBC AUTO: 95 FL (ref 82–98)
MONOCYTES # BLD AUTO: 0.18 THOUSAND/ΜL (ref 0.17–1.22)
MONOCYTES NFR BLD AUTO: 6 % (ref 4–12)
NEUTROPHILS # BLD AUTO: 1.65 THOUSANDS/ΜL (ref 1.85–7.62)
NEUTS SEG NFR BLD AUTO: 52 % (ref 43–75)
NRBC BLD AUTO-RTO: 0 /100 WBCS
PLATELET # BLD AUTO: 101 THOUSANDS/UL (ref 149–390)
PMV BLD AUTO: 9.6 FL (ref 8.9–12.7)
RBC # BLD AUTO: 3.05 MILLION/UL (ref 3.81–5.12)
WBC # BLD AUTO: 3.19 THOUSAND/UL (ref 4.31–10.16)

## 2017-07-07 PROCEDURE — 85025 COMPLETE CBC W/AUTO DIFF WBC: CPT

## 2017-07-07 PROCEDURE — 36415 COLL VENOUS BLD VENIPUNCTURE: CPT

## 2017-07-12 RX ORDER — LAMOTRIGINE 25 MG/1
250 TABLET ORAL ONCE
Status: COMPLETED | OUTPATIENT
Start: 2017-07-13 | End: 2017-07-13

## 2017-07-13 ENCOUNTER — HOSPITAL ENCOUNTER (OUTPATIENT)
Dept: INFUSION CENTER | Facility: CLINIC | Age: 82
Discharge: HOME | End: 2017-07-13
Payer: MEDICARE

## 2017-07-13 VITALS
TEMPERATURE: 98.2 F | RESPIRATION RATE: 14 BRPM | DIASTOLIC BLOOD PRESSURE: 66 MMHG | SYSTOLIC BLOOD PRESSURE: 142 MMHG | HEART RATE: 97 BPM | OXYGEN SATURATION: 95 %

## 2017-07-13 PROCEDURE — 96402 CHEMO HORMON ANTINEOPL SQ/IM: CPT

## 2017-07-13 RX ADMIN — FULVESTRANT 250 MG: 50 INJECTION INTRAMUSCULAR at 13:28

## 2017-07-13 NOTE — PROGRESS NOTES
Pt here for Faslodex injection, c/o sore gumsJessica RN with Dr Brendan Ogden made called aware  Per Luke Palafox pt should rinse with salt water rinses and if no improvement call the office

## 2017-07-13 NOTE — PROGRESS NOTES
Pt tolerated faslodex injections well in bilateral sacrum  Pt declined AVS  Pt states she will follow up with physcian regarding mouth ache

## 2017-07-24 ENCOUNTER — GENERIC CONVERSION - ENCOUNTER (OUTPATIENT)
Dept: OTHER | Facility: OTHER | Age: 82
End: 2017-07-24

## 2017-08-02 ENCOUNTER — APPOINTMENT (OUTPATIENT)
Dept: LAB | Facility: MEDICAL CENTER | Age: 82
End: 2017-08-02
Payer: MEDICARE

## 2017-08-02 DIAGNOSIS — C78.7 SECONDARY MALIGNANT NEOPLASM OF LIVER AND INTRAHEPATIC BILE DUCT (HCC): ICD-10-CM

## 2017-08-02 DIAGNOSIS — C50.111 MALIGNANT NEOPLASM OF CENTRAL PORTION OF RIGHT FEMALE BREAST (HCC): ICD-10-CM

## 2017-08-02 LAB
ALBUMIN SERPL BCP-MCNC: 3.2 G/DL (ref 3.5–5)
ALP SERPL-CCNC: 99 U/L (ref 46–116)
ALT SERPL W P-5'-P-CCNC: 18 U/L (ref 12–78)
ANION GAP SERPL CALCULATED.3IONS-SCNC: 8 MMOL/L (ref 4–13)
AST SERPL W P-5'-P-CCNC: 31 U/L (ref 5–45)
BILIRUB SERPL-MCNC: 0.39 MG/DL (ref 0.2–1)
BUN SERPL-MCNC: 38 MG/DL (ref 5–25)
CALCIUM SERPL-MCNC: 9.2 MG/DL (ref 8.3–10.1)
CHLORIDE SERPL-SCNC: 107 MMOL/L (ref 100–108)
CO2 SERPL-SCNC: 25 MMOL/L (ref 21–32)
CREAT SERPL-MCNC: 2.17 MG/DL (ref 0.6–1.3)
GFR SERPL CREATININE-BSD FRML MDRD: 19 ML/MIN/1.73SQ M
GLUCOSE SERPL-MCNC: 161 MG/DL (ref 65–140)
POTASSIUM SERPL-SCNC: 4.7 MMOL/L (ref 3.5–5.3)
PROT SERPL-MCNC: 6.7 G/DL (ref 6.4–8.2)
SODIUM SERPL-SCNC: 140 MMOL/L (ref 136–145)

## 2017-08-02 PROCEDURE — 80053 COMPREHEN METABOLIC PANEL: CPT

## 2017-08-02 PROCEDURE — 86300 IMMUNOASSAY TUMOR CA 15-3: CPT

## 2017-08-03 DIAGNOSIS — C78.7 SECONDARY MALIGNANT NEOPLASM OF LIVER AND INTRAHEPATIC BILE DUCT (HCC): ICD-10-CM

## 2017-08-03 DIAGNOSIS — C50.111 MALIGNANT NEOPLASM OF CENTRAL PORTION OF RIGHT FEMALE BREAST (HCC): ICD-10-CM

## 2017-08-03 DIAGNOSIS — E83.119 HEMOCHROMATOSIS: ICD-10-CM

## 2017-08-03 LAB — CANCER AG27-29 SERPL-ACNC: 2509.5 U/ML (ref 0–42.3)

## 2017-08-07 RX ORDER — LAMOTRIGINE 25 MG/1
250 TABLET ORAL ONCE
Status: COMPLETED | OUTPATIENT
Start: 2017-08-08 | End: 2017-08-08

## 2017-08-08 ENCOUNTER — ALLSCRIPTS OFFICE VISIT (OUTPATIENT)
Dept: OTHER | Facility: OTHER | Age: 82
End: 2017-08-08

## 2017-08-08 ENCOUNTER — HOSPITAL ENCOUNTER (OUTPATIENT)
Dept: INFUSION CENTER | Facility: CLINIC | Age: 82
Discharge: HOME | End: 2017-08-08
Payer: MEDICARE

## 2017-08-08 VITALS
DIASTOLIC BLOOD PRESSURE: 61 MMHG | TEMPERATURE: 98.2 F | HEART RATE: 99 BPM | SYSTOLIC BLOOD PRESSURE: 131 MMHG | RESPIRATION RATE: 18 BRPM

## 2017-08-08 PROCEDURE — 96402 CHEMO HORMON ANTINEOPL SQ/IM: CPT

## 2017-08-08 PROCEDURE — 96401 CHEMO ANTI-NEOPL SQ/IM: CPT

## 2017-08-08 RX ADMIN — FULVESTRANT 250 MG: 50 INJECTION INTRAMUSCULAR at 12:12

## 2017-08-08 RX ADMIN — DENOSUMAB 120 MG: 120 INJECTION SUBCUTANEOUS at 12:05

## 2017-08-08 RX ADMIN — FULVESTRANT 250 MG: 50 INJECTION INTRAMUSCULAR at 12:11

## 2017-08-08 NOTE — PROGRESS NOTES
Pt  offers no complaints  Ca+ 9 2 on 8/2/17  Pt verifies taking calcium supplements at home  Xgeva admin  SQ in LEXA without incident  Faslodex admin  IM in left and right buttock without incident  AVS declined  Next appt  confirmed

## 2017-08-08 NOTE — PLAN OF CARE
Problem: Potential for Falls  Goal: Patient will remain free of falls  INTERVENTIONS:  - Assess patient frequently for physical needs  -  Identify cognitive and physical deficits and behaviors that affect risk of falls    -  [place] fall precautions as indicated by assessment   - Educate patient/family on patient safety including physical limitations  - Instruct patient to call for assistance with activity based on assessment  - Modify environment to reduce risk of injury  - Consider OT/PT consult to assist with strengthening/mobility   Outcome: Progressing

## 2017-08-30 ENCOUNTER — APPOINTMENT (OUTPATIENT)
Dept: LAB | Facility: MEDICAL CENTER | Age: 82
End: 2017-08-30
Payer: MEDICARE

## 2017-08-30 DIAGNOSIS — C50.111 MALIGNANT NEOPLASM OF CENTRAL PORTION OF RIGHT FEMALE BREAST (HCC): ICD-10-CM

## 2017-08-30 DIAGNOSIS — C78.7 SECONDARY MALIGNANT NEOPLASM OF LIVER AND INTRAHEPATIC BILE DUCT (HCC): ICD-10-CM

## 2017-08-30 LAB
ALBUMIN SERPL BCP-MCNC: 3.5 G/DL (ref 3.5–5)
ALP SERPL-CCNC: 79 U/L (ref 46–116)
ALT SERPL W P-5'-P-CCNC: 23 U/L (ref 12–78)
ANION GAP SERPL CALCULATED.3IONS-SCNC: 11 MMOL/L (ref 4–13)
AST SERPL W P-5'-P-CCNC: 39 U/L (ref 5–45)
BILIRUB SERPL-MCNC: 0.39 MG/DL (ref 0.2–1)
BUN SERPL-MCNC: 38 MG/DL (ref 5–25)
CALCIUM SERPL-MCNC: 9.2 MG/DL (ref 8.3–10.1)
CHLORIDE SERPL-SCNC: 107 MMOL/L (ref 100–108)
CO2 SERPL-SCNC: 22 MMOL/L (ref 21–32)
CREAT SERPL-MCNC: 2.07 MG/DL (ref 0.6–1.3)
GFR SERPL CREATININE-BSD FRML MDRD: 21 ML/MIN/1.73SQ M
GLUCOSE SERPL-MCNC: 132 MG/DL (ref 65–140)
POTASSIUM SERPL-SCNC: 5 MMOL/L (ref 3.5–5.3)
PROT SERPL-MCNC: 7.2 G/DL (ref 6.4–8.2)
SODIUM SERPL-SCNC: 140 MMOL/L (ref 136–145)

## 2017-08-30 PROCEDURE — 36415 COLL VENOUS BLD VENIPUNCTURE: CPT

## 2017-08-30 PROCEDURE — 80053 COMPREHEN METABOLIC PANEL: CPT

## 2017-08-30 PROCEDURE — 86300 IMMUNOASSAY TUMOR CA 15-3: CPT

## 2017-08-31 LAB — CANCER AG27-29 SERPL-ACNC: 2148.6 U/ML (ref 0–42.3)

## 2017-09-01 RX ORDER — LAMOTRIGINE 25 MG/1
250 TABLET ORAL ONCE
Status: COMPLETED | OUTPATIENT
Start: 2017-09-05 | End: 2017-09-05

## 2017-09-05 ENCOUNTER — HOSPITAL ENCOUNTER (OUTPATIENT)
Dept: INFUSION CENTER | Facility: CLINIC | Age: 82
Discharge: HOME | End: 2017-09-05
Payer: MEDICARE

## 2017-09-05 PROCEDURE — 96401 CHEMO ANTI-NEOPL SQ/IM: CPT

## 2017-09-05 PROCEDURE — 96402 CHEMO HORMON ANTINEOPL SQ/IM: CPT

## 2017-09-05 RX ADMIN — FULVESTRANT 250 MG: 50 INJECTION INTRAMUSCULAR at 12:18

## 2017-09-05 RX ADMIN — FULVESTRANT 250 MG: 50 INJECTION INTRAMUSCULAR at 12:26

## 2017-09-05 RX ADMIN — DENOSUMAB 120 MG: 120 INJECTION SUBCUTANEOUS at 12:12

## 2017-09-05 NOTE — PROGRESS NOTES
Pt to infusion center for xgeva and faslodex escorted by daughter  Pt tolerated injections without issue  Pt offers no complaints   Pt has f/u appt given copy of AVS

## 2017-09-08 DIAGNOSIS — C50.111 MALIGNANT NEOPLASM OF CENTRAL PORTION OF RIGHT FEMALE BREAST (HCC): ICD-10-CM

## 2017-09-08 DIAGNOSIS — C78.7 SECONDARY MALIGNANT NEOPLASM OF LIVER AND INTRAHEPATIC BILE DUCT (HCC): ICD-10-CM

## 2017-10-02 RX ORDER — LAMOTRIGINE 25 MG/1
250 TABLET ORAL ONCE
Status: COMPLETED | OUTPATIENT
Start: 2017-10-03 | End: 2017-10-03

## 2017-10-03 ENCOUNTER — HOSPITAL ENCOUNTER (OUTPATIENT)
Dept: INFUSION CENTER | Facility: CLINIC | Age: 82
Discharge: HOME | End: 2017-10-03
Payer: MEDICARE

## 2017-10-03 ENCOUNTER — ALLSCRIPTS OFFICE VISIT (OUTPATIENT)
Dept: OTHER | Facility: OTHER | Age: 82
End: 2017-10-03

## 2017-10-03 PROCEDURE — 96402 CHEMO HORMON ANTINEOPL SQ/IM: CPT

## 2017-10-03 RX ADMIN — FULVESTRANT 250 MG: 50 INJECTION INTRAMUSCULAR at 10:48

## 2017-10-03 RX ADMIN — DENOSUMAB 120 MG: 120 INJECTION SUBCUTANEOUS at 10:43

## 2017-10-03 NOTE — PROGRESS NOTES
Pt is here for xgeva and faslodex  Pt offers no complaints at this time  Pt saw Dr Sarah Garcia prior to infusion appointment  Pt's calcium is 8 9 which meets the parameters for xgeva  xgeva given in her left arm and faslodex given on injection in each buttocks and pt tolerated it well  Next appointment confirmed   Pt declined avs

## 2017-10-03 NOTE — PLAN OF CARE
Problem: Potential for Falls  Goal: Patient will remain free of falls  INTERVENTIONS:  - Assess patient frequently for physical needs  -  Identify cognitive and physical deficits and behaviors that affect risk of falls    -  [place] fall precautions as indicated by assessment   - Educate patient/family on patient safety including physical limitations  - Instruct patient to call for assistance with activity based on assessment  - Modify environment to reduce risk of injury  - Consider OT/PT consult to assist with strengthening/mobility   Outcome: Progressing

## 2017-10-12 NOTE — PROGRESS NOTES
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)    Plan  Liver metastases    · (1) COMPREHENSIVE METABOLIC PANEL; Status:Active; Requested WDZ:[id number];    Perform:[facility]; ELADIA:[id number]; Last Updated By:Lima Ramirez; 10/3/2017 9:57:58 AM;Ordered; For:Liver metastases; Ordered By:Radha Broussard;  Liver metastases, Malignant neoplasm of central portion of right female breast    · (1) CA 27 29; Status:Active; Requested TCJ:73WXG4407;    Perform:[facility]; SSL:36WSP8333; Last Updated By:Lima Ramirez; 10/3/2017 9:57:58 AM;Ordered; For:Liver metastases, Malignant neoplasm of central portion of right female breast; Ordered By:Radha Broussard; Malignant neoplasm metastatic to lymph node of axilla    · (1) CBC/PLT/DIFF; Status:Active; Requested for:98Mgz5577;    Perform:[facility]; Due:76Wgu8115; Last Updated By:Lima Ramirez; 10/3/2017 9:58:40 AM;Ordered; For:Malignant neoplasm metastatic to lymph node of axilla; Ordered By:Radha Broussard;   · (1) CBC/PLT/DIFF; Status:Active; Requested XUR:35LZT6489;    Perform:[facility]; EBL:39CGN3196; Last Updated By:Lima Ramirez; 10/3/2017 9:58:31 AM;Ordered; For:Malignant neoplasm metastatic to lymph node of axilla; Ordered By:Radha Broussard;   · (1) CBC/PLT/DIFF; Status:Complete; Requested for:Recurring Schedule: 11/1/2017;  12/1/2017 ;    Perform:[facility]; VGQ:63QSG5021; Ordered; For:Malignant neoplasm metastatic to lymph node of axilla; Ordered By:Radha Broussard;   · (1) COMPREHENSIVE METABOLIC PANEL; Status:Active; Requested for:06Myy6002;    Perform:[facility]; Due:43Avx4059;  Last Updated Tony Lizarraga; 10/3/2017 10:31:43 AM;Ordered; For:Malignant neoplasm metastatic to lymph node of axilla; Ordered By:Radha Broussard;   · (1) COMPREHENSIVE METABOLIC PANEL; Status:Active; Requested PHI:[id number];    Perform:[facility]; VWK:93AEV6492; Last Updated By:Nancy Ramirez; 10/3/2017 10:31:22 AM;Ordered; For:Malignant neoplasm metastatic to lymph node of axilla; Ordered By:Radha Broussard;   · (1) COMPREHENSIVE METABOLIC PANEL; Status:Complete; Requested for:Recurring  Schedule: 11/1/2017; 12/1/2017 ;    Perform:[facility]; ZWF:66CKT9386; Ordered; For:Malignant neoplasm metastatic to lymph node of axilla; Ordered By:Radha Broussard;   · (1) FERRITIN; Status:Active; Requested for:06Wkx2250;    Perform:[facility]; Due:42Sqa7242; Ordered; For:Malignant neoplasm metastatic to lymph node of axilla; Ordered By:Radha Broussard;   · (1) HEMOCHROMATOSIS MUTATION; Status:Active; Requested for:72Dpm0566;    Perform:[facility]; Due:45Fkf0042; Ordered; For:Malignant neoplasm metastatic to lymph node of axilla; Ordered By:Radha Broussard;   · Follow-up visit in 2 months Evaluation and Treatment  Follow-up  Status: Complete   Done: 40FCQ5160 10:30AM   Ordered; For: Malignant neoplasm metastatic to lymph node of axilla; Ordered By: Den Welch Performed:  Due: 97UHF2921; Last Updated By: Sung White; 10/3/2017 10:11:30 AM  Malignant neoplasm metastatic to lymph node of axilla, Malignant neoplasm of central  portion of right female breast    · (1) CA 27 29; Status:Active; Requested for:52Ael8881;    Perform:[facility]; Due:45Fdr8452; Ordered; For:Malignant neoplasm metastatic to lymph node of axilla, Malignant neoplasm of central portion of right female breast; Ordered By:Radha Broussard; Discussion/Summary  Discussion Summary:   81 y/o female presents for f/u regarding hormone receptor positive breast cancer, stage IV as outlined in the HPI   She continues on Nishant Treadwell, Faslodex, and Xgeva  She is tolerating this well  She does not offer complaints today  Her appetite has improved as well as energy level  Weight has been stable since June 2017  She previously has mouth sores and was treated with Magic Mouthwash but these sores have since resolved and she has not needed to use rinse  recent CA 27 29 from 8/30/17 was 2148  6, which is improved  Repeat value has been requested prior to follow-up in 2 months  8 9 on 9/27/17  CBC: Hemoglobin 8 9, , WBC 2 1, ANC 1 0, platelet 869  Hemoglobin remains stable  Patent is asymptomatic in regards to anemia  Will monitor CBC monthly  was found to be elevated  Hemochromatosis was to be checked but not achieved  Order given again  and daughter voiced understanding and agreement in above discussion  THey are aware to contact us with questions/symptoms in the interim  Counseling Documentation With Imm: The patient, patient's family was counseled regarding diagnostic results,-instructions for management,-patient and family education,-impressions  total time of encounter was 20 minutes-and-minutes was spent counseling  Goals and Barriers: The patient has the current Goals: Prolongation of survival  The patent has the current Barriers: None  Patient's Capacity to Self-Care: Patient is able to Self-Care  Medication SE Review and Pt Understands Tx: The treatment plan was reviewed with the patient/guardian  The patient/guardian understands and agrees with the treatment plan   Self Referrals:   Self Referrals: No   Understands and agrees with treatment plan: The treatment plan was reviewed with the patient/guardian   The patient/guardian understands and agrees with the treatment plan      History of Present Illness  HPI: 81 y/o female presents for f/u regarding metastatic breast cancer, hormone receptors positive and HER-2 negative Since April 2016 patient has been on Femara and Xgeva for metastatic, receptor positive right breast cancer to right axillary lymph node, lungs and bones  She responded  Her performance status improved  Tumor marker CA 27 29 came down from 1455 in March 2016 to 300 range in Dec 2016  In January CA 27 29 increased to 532 1  PET/CT scan in Feb 2017 showed improvement in the right breast and axilla area mass and lung nodules but increased disease in the bones  At that time patient did not have increased bone pains  CA 27 29 in May 2017 was reported as greater than 450  Due to inconclusive lab results, PET/CT was performed  This showed new abnormal foci of activity within the liver, diffuse intense uptake within the osseous structures; also new foci of increased uptake seen within the osseous structures  Stable small pulmonary nodules and right breast mass as well as right axillary lymph node  Due to progression noted in scan, letrozole discontinued  Faslodex and Ibrance initiated continued  Xgeva continued  2017- patient was experiencing increase in fatigue, loss of appetite  Lack of interest in activities  Dose of Ibrance was decrease to 100 mg   Current Therapy: Xgeva 120 mg SQ every 4 weeks 100 mg PO daily days 1-21 every 28 days 500 mg IM monthly   Interval History: appetite is improved well sores have resolved      Review of Systems  Complete-Female:   Constitutional: No fever, no chills, feels well, no tiredness, no recent weight gain or weight loss  Eyes: No complaints of eye pain, no red eyes, no eyesight problems, no discharge, no dry eyes, no itching of eyes  ENT: no complaints of earache, no loss of hearing, no nose bleeds, no nasal discharge, no sore throat, no hoarseness  Cardiovascular: No complaints of slow heart rate, no fast heart rate, no chest pain, no palpitations, no leg claudication, no lower extremity edema  Respiratory: No complaints of shortness of breath, no wheezing, no cough, no SOB on exertion, no orthopnea, no PND     Gastrointestinal: No complaints of abdominal pain, no constipation, no nausea or vomiting, no diarrhea, no bloody stools  Genitourinary: No complaints of dysuria, no incontinence, no pelvic pain, no dysmenorrhea, no vaginal discharge or bleeding  Musculoskeletal: No complaints of arthralgias, no myalgias, no joint swelling or stiffness, no limb pain or swelling  Integumentary: No complaints of skin rash or lesions, no itching, no skin wounds, no breast pain or lump  Neurological: No complaints of headache, no confusion, no convulsions, no numbness, no dizziness or fainting, no tingling, no limb weakness, no difficulty walking  Psychiatric: no anxiety-and-no depression  Endocrine: no muscle weakness  no feelings of weakness   Hematologic/Lymphatic: No complaints of swollen glands, no swollen glands in the neck, does not bleed easily, does not bruise easily  ROS Reviewed:   ROS reviewed  Active Problems  1  Anemia (285 9) (D64 9)   2  Anemia, deficiency (281 9) (D53 9)   3  Anorexia (783 0) (R63 0)   4  Benign essential hypertension (401 1) (I10)   5  Bone metastases (198 5) (C79 51)   6  Cancer, metastatic to lung (197 0) (C78 00)   7  Contusion of lower leg, right (924 10) (S80 11XA)   8  Diabetes mellitus (250 00) (E11 9)   9  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)   10  Leg injury (959 7) (S89 90XA)   11  Liver metastases (197 7) (C78 7)   12  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)   13  Malignant neoplasm of central portion of right female breast (174 1) (C50 111)   14  Mouth sore (528 9) (K13 79)    Surgical History  Surgical History Reviewed: The surgical history was reviewed and updated today  Family History  Mother    1  Family history of malignant neoplasm of uterus (V16 49) (Z80 49)  Sister    2  Family history of malignant neoplasm of uterus (V16 49) (Z80 49)  Family History Reviewed: The family history was reviewed and updated today         Social History   · Former smoker (B40 20) (P40 247)   · No alcohol use  Social History Reviewed: The social history was reviewed and updated today  The social history was reviewed and is unchanged  Current Meds   1  AmLODIPine Besylate 2 5 MG Oral Tablet; Therapy: 47TAL0822 to Recorded   2  Calcium 1000 + D 1000-800 MG-UNIT Oral Tablet; Therapy: (Recorded:2017) to Recorded   3  Faslodex 125 MG/2 5ML SOLN;   Therapy: (Recorded:2017) to Recorded   4  Ibrance 100 MG Oral Capsule; 1 TAB PO DAILY FOR 21 DAYS THEN 7 OFF  Requested   for: 2017; Last Rx:2017 Ordered   5  Iron Supplement 325 (65 Fe) MG TABS; Therapy: (361 [id number] 921) to Recorded   6  Lantus SoloStar 100 UNIT/ML SOLN;   Therapy: (Recorded:2017) to Recorded   7  Mirtazapine 15 MG Oral Tablet; TAKE 1 TABLET AT BEDTIME; Therapy: 38VWE6268 to ([id number])  Requested for: 2017; Last   [id number] Ordered   8  NovoLOG 100 UNIT/ML Subcutaneous Solution; INJECT SUBCUTANEOUSLY AS   DIRECTED; Therapy: (Starlet Star) to Recorded   9  SLPG Magic Mouthwash 1:1:1 maalox/diphenhydramine/lidocaine; SWISH AND SPIT 30   ML 1-3 TIMES PER DAY AS NEEDED FOR MOUTH PAIN;   Therapy: 91EPH0954 to (Last Rx:26Zbc2285) Ordered   10  Xgeva 120 MG/1 7ML Subcutaneous Solution; Therapy: (087 492 921) to Recorded  Medication List Reviewed: The medication list was reviewed and updated today  Allergies  1  No Known Drug Allergies    Vitals  Vital Signs    Recorded: 32SND7748 09:33AM   Temperature 97 1 F   Heart Rate 94   Respiration 15   Systolic 650   Diastolic 78   Height 5 ft 1 in   Weight 169 lb    BMI Calculated 31 93   BSA Calculated 1 76   O2 Saturation 99   Pain Scale 0     Physical Exam    Constitutional   General appearance: No acute distress, well appearing and well nourished  Eyes   Conjunctiva and lids: No swelling, erythema or discharge      Ears, Nose, Mouth, and Throat   External inspection of ears and nose: Normal     Oropharynx: Normal with no erythema, edema, exudate or lesions  Pulmonary   Respiratory effort: No increased work of breathing or signs of respiratory distress  Auscultation of lungs: Clear to auscultation  Cardiovascular   Auscultation of heart: Normal rate and rhythm, normal S1 and S2, without murmurs  Examination of extremities for edema and/or varicosities: Normal     Abdomen   Abdomen: Non-tender, no masses  Liver and spleen: No hepatomegaly or splenomegaly  Lymphatic   Palpation of lymph nodes in neck: No lymphadenopathy  Musculoskeletal   Gait and station: Normal     Skin   Skin and subcutaneous tissue: Normal without rashes or lesions  Neurologic   Cranial nerves: Cranial nerves 2-12 intact  Psychiatric   Orientation to person, place, and time: Normal     Mood and affect: Normal          Results/Data  (1) COMPREHENSIVE METABOLIC PANEL 01ZVY9423 71:08WX Tanya Pittman    Order Number: [id number]     Test Name Result Flag Reference   GLUCOSE,RANDM 132 mg/dL     If the patient is fasting, the ADA then defines impaired fasting glucose as > 100 mg/dL and diabetes as > or equal to 123 mg/dL  Specimen collection should occur prior to Sulfasalazine administration due to the potential for falsely depressed results  Specimen collection should occur prior to Sulfapyridine administration due to the potential for falsely elevated results  SODIUM 140 mmol/L  136-145   POTASSIUM 5 0 mmol/L  3 5-5 3   Slightly Hemolyzed; Results May be Affected   CHLORIDE 107 mmol/L  100-108   CARBON DIOXIDE 22 mmol/L  21-32   ANION GAP (CALC) 11 mmol/L  4-13   BLOOD UREA NITROGEN 38 mg/dL H 5-25   CREATININE 2 07 mg/dL H 0 60-1 30   Standardized to IDMS reference method   CALCIUM 9 2 mg/dL  8 3-10 1   BILI, TOTAL 0 39 mg/dL  0 20-1 00   ALK PHOSPHATAS 79 U/L     ALT (SGPT) 23 U/L  12-78   Specimen collection should occur prior to Sulfasalazine and/or Sulfapyridine administration due to the potential for falsely depressed results  AST(SGOT) 39 U/L  5-45   Slightly Hemolyzed; Results May be Affected  Specimen collection should occur prior to Sulfasalazine administration due to the potential for falsely depressed results  ALBUMIN 3 5 g/dL  3 5-5 0   TOTAL PROTEIN 7 2 g/dL  6 4-8 2   eGFR 21 ml/min/1 73sq m     [employer] Disease Education Program recommendations are as follows:  GFR calculation is accurate only with a steady state creatinine  Chronic Kidney disease less than 60 ml/min/1 73 sq  meters  Kidney failure less than 15 ml/min/1 73 sq  meters  (1) CA 27 29 52Bmk7643 11:37AM [employer] Order Number: [id number]     Test Name Result Flag Reference   CA 27 29(NEW) 2148 6 U/mL H 0 0-42 3     (1) COMPREHENSIVE METABOLIC PANEL 96TFU2889 06:51RV [place] Order Number: [id number]     Test Name Result Flag Reference   GLUCOSE,RANDM 161 mg/dL H    If the patient is fasting, the ADA then defines impaired fasting glucose as > 100 mg/dL and diabetes as > or equal to 123 mg/dL  SODIUM 140 mmol/L  136-145   POTASSIUM 4 7 mmol/L  3 5-5 3   CHLORIDE 107 mmol/L  100-108   CARBON DIOXIDE 25 mmol/L  21-32   ANION GAP (CALC) 8 mmol/L  4-13   BLOOD UREA NITROGEN 38 mg/dL H 5-25   CREATININE 2 17 mg/dL H 0 60-1 30   Standardized to IDMS reference method   CALCIUM 9 2 mg/dL  8 3-10 1   BILI, TOTAL 0 39 mg/dL  0 20-1 00   ALK PHOSPHATAS 99 U/L     ALT (SGPT) 18 U/L  12-78   AST(SGOT) 31 U/L  5-45   ALBUMIN 3 2 g/dL L 3 5-5 0   TOTAL PROTEIN 6 7 g/dL  6 4-8 2   eGFR 19 ml/min/1 73sq m     [employer] Disease Education Program recommendations are as follows:  GFR calculation is accurate only with a steady state creatinine  Chronic Kidney disease less than 60 ml/min/1 73 sq  meters  Kidney failure less than 15 ml/min/1 73 sq  meters       (1) CA 27 29 44Lso1549 11:18AM [employer] Order Number: [id number]     Test Name Result Flag Reference   CA 27 29(NEW) 5242 5 U/mL H 0 0-42 3     (1) CBC/PLT/DIFF 66PME6325 02:32PM Elodia Khanna Order Number: [id number]     Test Name Result Flag Reference   WBC COUNT 3 19 Thousand/uL L 4 31-10 16   RBC COUNT 3 05 Million/uL L 3 81-5 12   HEMOGLOBIN 9 1 g/dL L 11 5-15 4   HEMATOCRIT 29 1 % L 34 8-46  1   MCV 95 fL  82-98   MCH 29 8 pg  26 8-34 3   MCHC 31 3 g/dL L 31 4-37 4   RDW 18 5 % H 11 6-15 1   MPV 9 6 fL  8 9-12 7   PLATELET COUNT 153 Thousands/uL L 149-390   nRBC AUTOMATED 0 /100 WBCs     NEUTROPHILS RELATIVE PERCENT 52 %  43-75   LYMPHOCYTES RELATIVE PERCENT 41 %  14-44   MONOCYTES RELATIVE PERCENT 6 %  4-12   EOSINOPHILS RELATIVE PERCENT 1 %  0-6   BASOPHILS RELATIVE PERCENT 0 %  0-1   NEUTROPHILS ABSOLUTE COUNT 1 65 Thousands/? ??L L 1 85-7 62   LYMPHOCYTES ABSOLUTE COUNT 1 31 Thousands/? ??L  0 60-4 47   MONOCYTES ABSOLUTE COUNT 0 18 Thousand/? ??L  0 17-1 22   EOSINOPHILS ABSOLUTE COUNT 0 03 Thousand/? ??L  0 00-0 61   BASOPHILS ABSOLUTE COUNT 0 01 Thousands/? ??L  0 00-0 10     Future Appointments    Date/Time Provider Specialty Site   11/21/2017 10:30 AM Basil Levy [facility] Hematology On[facility]     Signatures   Electronically signed by : Ramin Marshall [facility]; Oct  3 2017 12:02PM EST                       (Author)    Electronically signed by : Nicole Tolliver MD; Oct 11 2017  8:20AM EST                       (Author)    Electronically signed by : Nicole Tolliver MD; Oct 11 2017  8:20AM EST                       (Author)

## 2017-10-27 ENCOUNTER — APPOINTMENT (OUTPATIENT)
Dept: LAB | Facility: MEDICAL CENTER | Age: 82
End: 2017-10-27
Payer: MEDICARE

## 2017-10-27 DIAGNOSIS — C50.111 MALIGNANT NEOPLASM OF CENTRAL PORTION OF RIGHT FEMALE BREAST (HCC): ICD-10-CM

## 2017-10-27 LAB
ALBUMIN SERPL BCP-MCNC: 3.3 G/DL (ref 3.5–5)
ALP SERPL-CCNC: 94 U/L (ref 46–116)
ALT SERPL W P-5'-P-CCNC: 26 U/L (ref 12–78)
ANION GAP SERPL CALCULATED.3IONS-SCNC: 5 MMOL/L (ref 4–13)
AST SERPL W P-5'-P-CCNC: 27 U/L (ref 5–45)
BILIRUB SERPL-MCNC: 0.48 MG/DL (ref 0.2–1)
BUN SERPL-MCNC: 33 MG/DL (ref 5–25)
CALCIUM SERPL-MCNC: 9.1 MG/DL (ref 8.3–10.1)
CHLORIDE SERPL-SCNC: 104 MMOL/L (ref 100–108)
CO2 SERPL-SCNC: 26 MMOL/L (ref 21–32)
CREAT SERPL-MCNC: 1.99 MG/DL (ref 0.6–1.3)
GFR SERPL CREATININE-BSD FRML MDRD: 22 ML/MIN/1.73SQ M
GLUCOSE SERPL-MCNC: 356 MG/DL (ref 65–140)
POTASSIUM SERPL-SCNC: 4.8 MMOL/L (ref 3.5–5.3)
PROT SERPL-MCNC: 7 G/DL (ref 6.4–8.2)
SODIUM SERPL-SCNC: 135 MMOL/L (ref 136–145)

## 2017-10-27 PROCEDURE — 36415 COLL VENOUS BLD VENIPUNCTURE: CPT

## 2017-10-27 PROCEDURE — 86300 IMMUNOASSAY TUMOR CA 15-3: CPT

## 2017-10-27 PROCEDURE — 80053 COMPREHEN METABOLIC PANEL: CPT

## 2017-10-28 LAB — CANCER AG27-29 SERPL-ACNC: 1570.8 U/ML (ref 0–42.3)

## 2017-10-30 ENCOUNTER — GENERIC CONVERSION - ENCOUNTER (OUTPATIENT)
Dept: OTHER | Facility: OTHER | Age: 82
End: 2017-10-30

## 2017-10-30 RX ORDER — LAMOTRIGINE 25 MG/1
250 TABLET ORAL ONCE
Status: COMPLETED | OUTPATIENT
Start: 2017-10-31 | End: 2017-10-31

## 2017-10-31 ENCOUNTER — HOSPITAL ENCOUNTER (OUTPATIENT)
Dept: INFUSION CENTER | Facility: CLINIC | Age: 82
Discharge: HOME | End: 2017-10-31
Payer: MEDICARE

## 2017-10-31 VITALS
HEART RATE: 94 BPM | DIASTOLIC BLOOD PRESSURE: 60 MMHG | SYSTOLIC BLOOD PRESSURE: 146 MMHG | TEMPERATURE: 97.9 F | RESPIRATION RATE: 18 BRPM

## 2017-10-31 PROCEDURE — 96402 CHEMO HORMON ANTINEOPL SQ/IM: CPT

## 2017-10-31 PROCEDURE — 96401 CHEMO ANTI-NEOPL SQ/IM: CPT

## 2017-10-31 RX ADMIN — FULVESTRANT 250 MG: 50 INJECTION INTRAMUSCULAR at 10:09

## 2017-10-31 RX ADMIN — DENOSUMAB 120 MG: 120 INJECTION SUBCUTANEOUS at 09:57

## 2017-10-31 RX ADMIN — FULVESTRANT 250 MG: 50 INJECTION INTRAMUSCULAR at 10:10

## 2017-10-31 NOTE — PROGRESS NOTES
Pt received Xgeva SQ inj in EDDIE & Faslodex in B/L buttocks  Ca=9 01 on 10/27/17   Tolerated well, offers no complaints

## 2017-11-01 DIAGNOSIS — C77.3 SECONDARY AND UNSPECIFIED MALIGNANT NEOPLASM OF AXILLA AND UPPER LIMB LYMPH NODES (HCC): ICD-10-CM

## 2017-11-08 DIAGNOSIS — D53.9 NUTRITIONAL ANEMIA: ICD-10-CM

## 2017-11-08 DIAGNOSIS — C79.51 SECONDARY MALIGNANT NEOPLASM OF BONE (HCC): ICD-10-CM

## 2017-11-08 DIAGNOSIS — C78.7 SECONDARY MALIGNANT NEOPLASM OF LIVER AND INTRAHEPATIC BILE DUCT (HCC): ICD-10-CM

## 2017-11-08 DIAGNOSIS — E83.119 HEMOCHROMATOSIS: ICD-10-CM

## 2017-11-08 DIAGNOSIS — C50.111 MALIGNANT NEOPLASM OF CENTRAL PORTION OF RIGHT FEMALE BREAST (HCC): ICD-10-CM

## 2017-11-08 DIAGNOSIS — C78.00 SECONDARY MALIGNANT NEOPLASM OF LUNG (HCC): ICD-10-CM

## 2017-11-21 ENCOUNTER — APPOINTMENT (OUTPATIENT)
Dept: LAB | Facility: CLINIC | Age: 82
End: 2017-11-21
Payer: MEDICARE

## 2017-11-21 ENCOUNTER — ALLSCRIPTS OFFICE VISIT (OUTPATIENT)
Dept: OTHER | Facility: OTHER | Age: 82
End: 2017-11-21

## 2017-11-21 DIAGNOSIS — C79.51 SECONDARY MALIGNANT NEOPLASM OF BONE (HCC): ICD-10-CM

## 2017-11-21 DIAGNOSIS — C50.111 MALIGNANT NEOPLASM OF CENTRAL PORTION OF RIGHT FEMALE BREAST (HCC): ICD-10-CM

## 2017-11-21 DIAGNOSIS — C78.7 SECONDARY MALIGNANT NEOPLASM OF LIVER AND INTRAHEPATIC BILE DUCT (HCC): ICD-10-CM

## 2017-11-21 DIAGNOSIS — E83.119 HEMOCHROMATOSIS: ICD-10-CM

## 2017-11-21 DIAGNOSIS — C78.00 SECONDARY MALIGNANT NEOPLASM OF LUNG (HCC): ICD-10-CM

## 2017-11-21 DIAGNOSIS — D53.9 NUTRITIONAL ANEMIA: ICD-10-CM

## 2017-11-21 LAB
ALBUMIN SERPL BCP-MCNC: 3.1 G/DL (ref 3.5–5)
ALP SERPL-CCNC: 88 U/L (ref 46–116)
ALT SERPL W P-5'-P-CCNC: 28 U/L (ref 12–78)
ANION GAP SERPL CALCULATED.3IONS-SCNC: 9 MMOL/L (ref 4–13)
AST SERPL W P-5'-P-CCNC: 34 U/L (ref 5–45)
BASOPHILS # BLD AUTO: 0.03 THOUSANDS/ΜL (ref 0–0.1)
BASOPHILS NFR BLD AUTO: 1 % (ref 0–1)
BILIRUB SERPL-MCNC: 0.4 MG/DL (ref 0.2–1)
BUN SERPL-MCNC: 38 MG/DL (ref 5–25)
CALCIUM SERPL-MCNC: 9.7 MG/DL (ref 8.3–10.1)
CANCER AG27-29 SERPL-ACNC: 1472.7 U/ML (ref 0–42.3)
CHLORIDE SERPL-SCNC: 105 MMOL/L (ref 100–108)
CO2 SERPL-SCNC: 27 MMOL/L (ref 21–32)
CREAT SERPL-MCNC: 2.13 MG/DL (ref 0.6–1.3)
EOSINOPHIL # BLD AUTO: 0.06 THOUSAND/ΜL (ref 0–0.61)
EOSINOPHIL NFR BLD AUTO: 2 % (ref 0–6)
ERYTHROCYTE [DISTWIDTH] IN BLOOD BY AUTOMATED COUNT: 12.5 % (ref 11.6–15.1)
FERRITIN SERPL-MCNC: 642 NG/ML (ref 8–388)
GFR SERPL CREATININE-BSD FRML MDRD: 20 ML/MIN/1.73SQ M
GLUCOSE SERPL-MCNC: 154 MG/DL (ref 65–140)
HCT VFR BLD AUTO: 35.6 % (ref 34.8–46.1)
HGB BLD-MCNC: 11.5 G/DL (ref 11.5–15.4)
LYMPHOCYTES # BLD AUTO: 0.83 THOUSANDS/ΜL (ref 0.6–4.47)
LYMPHOCYTES NFR BLD AUTO: 25 % (ref 14–44)
MCH RBC QN AUTO: 35.4 PG (ref 26.8–34.3)
MCHC RBC AUTO-ENTMCNC: 32.3 G/DL (ref 31.4–37.4)
MCV RBC AUTO: 110 FL (ref 82–98)
MONOCYTES # BLD AUTO: 0.19 THOUSAND/ΜL (ref 0.17–1.22)
MONOCYTES NFR BLD AUTO: 6 % (ref 4–12)
NEUTROPHILS # BLD AUTO: 2.23 THOUSANDS/ΜL (ref 1.85–7.62)
NEUTS SEG NFR BLD AUTO: 66 % (ref 43–75)
PLATELET # BLD AUTO: 199 THOUSANDS/UL (ref 149–390)
PMV BLD AUTO: 9.2 FL (ref 8.9–12.7)
POTASSIUM SERPL-SCNC: 4.7 MMOL/L (ref 3.5–5.3)
PROT SERPL-MCNC: 6.7 G/DL (ref 6.4–8.2)
RBC # BLD AUTO: 3.25 MILLION/UL (ref 3.81–5.12)
SODIUM SERPL-SCNC: 141 MMOL/L (ref 136–145)
WBC # BLD AUTO: 3.34 THOUSAND/UL (ref 4.31–10.16)

## 2017-11-21 PROCEDURE — 86300 IMMUNOASSAY TUMOR CA 15-3: CPT

## 2017-11-21 PROCEDURE — 80053 COMPREHEN METABOLIC PANEL: CPT

## 2017-11-21 PROCEDURE — 36415 COLL VENOUS BLD VENIPUNCTURE: CPT

## 2017-11-21 PROCEDURE — 85025 COMPLETE CBC W/AUTO DIFF WBC: CPT

## 2017-11-21 PROCEDURE — 82728 ASSAY OF FERRITIN: CPT

## 2017-11-21 PROCEDURE — 81256 HFE GENE: CPT

## 2017-11-23 NOTE — PROGRESS NOTES
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)    Plan  Anemia, deficiency    · (1) FERRITIN; Status:Resulted - Requires Verification;   Done: 13THN7319 11:25AM   Due:21Nov2018; Ordered;deficiency; Ordered By:Radha Broussard;  Bone metastases, Cancer, metastatic to lung, Liver metastases, Malignant neoplasm ofcentral portion of right female breast    · (1) CBC/PLT/DIFF; Status:Resulted - Requires Verification;   Done: 99MZG8470 11:25AM   Due:21Nov2018; Ordered; For:Bone metastases, Cancer, metastatic to lung, Liver metastases, Malignant neoplasm of central portion of right female breast; Ordered By:Radha Broussard; Hemochromatosis    · (1) HEMOCHROMATOSIS MUTATION; Status:Active; Requested VQM:16HDX0711;    Perform:[facility]; JVJ:73DBS6977; Ordered;Ordered [de-identified], Radha;   · Follow-up visit in 2 months Evaluation and Treatment  Follow-up  Status: Complete Done: 55CQE8670 10:30AM   Ordered; Hemochromatosis; Ordered By: Romana Luke Performed:  Due: 35FME4453; Last Updated By: Balbir Sharma; 11/21/2017 11:10:46 AM    Discussion/Summary  Discussion Summary:   81 y/o female presents for f/u regarding hormone receptor positive breast cancer, stage IV as outlined in the HPI  She continues on CyberX, Faslodex, [place]  She is tolerating this well  She does not offer complaints today  Her energy level is improved  Weight has been stable since June 2017  She previously has mouth sores and was treated with Magic Mouthwash but these sores have since resolved and she has not needed to use rinse  recent CA 27 29 from 10/27/17 was 1570 8, which is improved  Repeat value to be assessed today    and CMP to be completed today as well was found to be elevated  Hemochromatosis was to be checked but not achieved  Order given again  and daughter voiced understanding and agreement in above discussion  THey are aware to contact us with questions/symptoms in the interim  Counseling Documentation With Imm: The patient, patient's family was counseled regarding diagnostic results,-- instructions for management,-- patient and family education,-- impressions  total time of encounter was 20 minutes-- and-- minutes was spent counseling  Goals and Barriers: The patient has the current Goals: Prolongation of survival  The patent has the current Barriers: None  Patient's Capacity to Self-Care: Patient agrees and allows to involve family/caregiver in development of care plan: Daughter  Medication SE Review and Pt Understands Tx: Possible side effects of new medications were reviewed with the patient/guardian today  The treatment plan was reviewed with the patient/guardian  The patient/guardian understands and agrees with the treatment plan   Self Referrals:   Self Referrals: No   Understands and agrees with treatment plan: The treatment plan was reviewed with the patient/guardian  The patient/guardian understands and agrees with the treatment plan      History of Present Illness  HPI: 79 y/o female presents for f/u regarding metastatic breast cancer, hormone receptors positive and HER-2 negative Since April 2016 patient has been on Femara and Xgeva for metastatic, receptor positive right breast cancer to right axillary lymph node, lungs and bones  She responded  Her performance status improved  Tumor marker CA 27 29 came down from 1455 in March 2016 to 300 range in Dec 2016  In January CA 27 29 increased to 532 1  PET/CT scan in Feb 2017 showed improvement in the right breast and axilla area mass and lung nodules but increased disease in the bones  At that time patient did not have increased bone pains  CA 27 29 in May 2017 was reported as greater than 450   Due to inconclusive lab results, PET/CT was performed  This showed new abnormal foci of activity within the liver, diffuse intense uptake within the osseous structures; also new foci of increased uptake seen within the osseous structures  Stable small pulmonary nodules and right breast mass as well as right axillary lymph node  Due to progression noted in scan, letrozole discontinued  Faslodex and Ibrance initiated continued  Xgeva continued  2017- patient was experiencing increase in fatigue, loss of appetite  Lack of interest in activities  Dose of Ibrance was decrease to 100 mg   Current Therapy: Xgeva 120 mg SQ every 4 [id number] mg PO daily days 1-21 every 28 [id number] mg IM monthly   Interval History: no complaints todaygood      Review of Systems  Complete-Female:  Constitutional: No fever, no chills, feels well, no tiredness, no recent weight gain or weight loss  Eyes: No complaints of eye pain, no red eyes, no eyesight problems, no discharge, no dry eyes, no itching of eyes  ENT: no complaints of earache, no loss of hearing, no nose bleeds, no nasal discharge, no sore throat, no hoarseness  Cardiovascular: lower extremity edema, but-- the heart rate was not slow,-- no chest pain,-- no intermittent leg claudication,-- the heart rate was not fast-- and-- no palpitations  Respiratory: No complaints of shortness of breath, no wheezing, no cough, no SOB on exertion, no orthopnea, no PND  Gastrointestinal: No complaints of abdominal pain, no constipation, no nausea or vomiting, no diarrhea, no bloody stools  Genitourinary: No complaints of dysuria, no incontinence, no pelvic pain, no dysmenorrhea, no vaginal discharge or bleeding  Musculoskeletal: No complaints of arthralgias, no myalgias, no joint swelling or stiffness, no limb pain or swelling  Integumentary: No complaints of skin rash or lesions, no itching, no skin wounds, no breast pain or lump    Neurological: No complaints of headache, no confusion, no convulsions, no numbness, no dizziness or fainting, no tingling, no limb weakness, no difficulty walking  Psychiatric: no anxiety-- and-- no depression  Endocrine: no muscle weakness  no feelings of weakness  Hematologic/Lymphatic: No complaints of swollen glands, no swollen glands in the neck, does not bleed easily, does not bruise easily  ROS Reviewed:   ROS reviewed  Active Problems  1  Anemia (285 9) (D64 9)   2  Anemia, deficiency (281 9) (D53 9)   3  Anorexia (783 0) (R63 0)   4  Benign essential hypertension (401 1) (I10)   5  Bone metastases (198 5) (C79 51)   6  Cancer, metastatic to lung (197 0) (C78 00)   7  Contusion of lower leg, right (924 10) (S80 11XA)   8  Diabetes mellitus (250 00) (E11 9)   9  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)   10  Leg injury (959 7) (S89 90XA)   11  Liver metastases (197 7) (C78 7)   12  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)   13  Malignant neoplasm of central portion of right female breast (174 1) (C50 111)   14  Mouth sore (528 9) (K13 79)    Surgical History  Surgical History Reviewed: The surgical history was reviewed and updated today  Family History  Mother    1  Family history of malignant neoplasm of uterus (V16 49) (Z80 49)  Sister    2  Family history of malignant neoplasm of uterus (V16 49) (Z80 49)  Family History Reviewed: The family history was reviewed and updated today  Social History     · Former smoker (P75 89) (A70 253)   · No alcohol use  Social History Reviewed: The social history was reviewed and updated today  The social history was reviewed and is unchanged  Current Meds   1  AmLODIPine Besylate 2 5 MG Oral Tablet; Therapy: 05XXX7019 to Recorded   2  Calcium 1000 + D 1000-800 MG-UNIT Oral Tablet; Therapy: (Recorded:08Jun2017) to Recorded   3  Faslodex 125 MG/2 5ML SOLN; Therapy: (Recorded:25Jun2017) to Recorded   4   Ibrance 100 MG Oral Capsule; 1 TAB PO DAILY FOR 21 DAYS THEN 7 OFF  Requested for: 28Jun2017; Last Rx:28Jun2017 Ordered   5  Iron Supplement 325 (65 Fe) MG TABS; Therapy: (Oumar ) to Recorded   6  Lantus SoloStar 100 UNIT/ML SOLN; Therapy: (Recorded:25Jun2017) to Recorded   7  Mirtazapine 15 MG Oral Tablet; TAKE 1 TABLET AT BEDTIME; Therapy: 17LSW0392 to (Keke Needle)  Requested for: 27Jun2017; Last YD:13EFC4834 Ordered   8  NovoLOG 100 UNIT/ML Subcutaneous Solution; INJECT SUBCUTANEOUSLY AS DIRECTED; Therapy: (941 14 442) to Recorded   9  SLPG Magic Mouthwash 1:1:1 maalox/diphenhydramine/lidocaine; SWISH AND SPIT 30 ML 1-3 TIMES PER DAY AS NEEDED FOR MOUTH PAIN; Therapy: 94XOE2878 to (Last Rx:67Hbc8571) Ordered   10  Xgeva 120 MG/1 7ML Subcutaneous Solution; Therapy: (Oumar Greeninder) to Recorded  Medication List Reviewed: The medication list was reviewed and updated today  Allergies  1  No Known Drug Allergies    Vitals  Vital Signs    Recorded: 21Nov2017 10:40AM   Temperature 96 6 F   Heart Rate 87   Respiration 16   Systolic 834   Diastolic 78   Height 5 ft 1 in   Weight 168 lb    BMI Calculated 31 74   BSA Calculated 1 75   O2 Saturation 99   Pain Scale 0       Physical Exam   Constitutional  General appearance: No acute distress, well appearing and well nourished  Eyes  Conjunctiva and lids: No swelling, erythema or discharge  Ears, Nose, Mouth, and Throat  External inspection of ears and nose: Normal    Oropharynx: Normal with no erythema, edema, exudate or lesions  Pulmonary  Respiratory effort: No increased work of breathing or signs of respiratory distress  Auscultation of lungs: Clear to auscultation  Cardiovascular  Auscultation of heart: Normal rate and rhythm, normal S1 and S2, without murmurs  Examination of extremities for edema and/or varicosities: Abnormal  -- +1 bilateral lower extremity edema  Abdomen  Abdomen: Non-tender, no masses  Lymphatic  Palpation of lymph nodes in neck: No lymphadenopathy     Musculoskeletal Gait and station: Abnormal  -- in wheelchair; ambulates with walker at [address]  Skin  Skin and subcutaneous tissue: Normal without rashes or lesions  Neurologic  Cranial nerves: Cranial nerves 2-12 intact  Psychiatric  Orientation to person, place, and time: Normal    Mood and affect: Normal          Results/Data  (1) CBC/PLT/DIFF 24IHG2437 11:25AM Tomasz Poole Order Number: [id number]     Test Name Result Flag Reference   WBC COUNT 3 34 Thousand/uL L 4 31-10 16   RBC COUNT 3 25 Million/uL L 3 81-5 12   HEMOGLOBIN 11 5 g/dL  11 5-15 4   HEMATOCRIT 35 6 %  34 8-46  1    fL H 82-98   MCH 35 4 pg H 26 8-34 3   MCHC 32 3 g/dL  31 4-37 4   RDW 12 5 %  11 6-15 1   MPV 9 2 fL  8 9-12 7   PLATELET COUNT 580 Thousands/uL  149-390   NEUTROPHILS RELATIVE PERCENT 66 %  43-75   LYMPHOCYTES RELATIVE PERCENT 25 %  14-44   MONOCYTES RELATIVE PERCENT 6 %  4-12   EOSINOPHILS RELATIVE PERCENT 2 %  0-6   BASOPHILS RELATIVE PERCENT 1 %  0-1   NEUTROPHILS ABSOLUTE COUNT 2 23 Thousands/? ??L  1 85-7 62   LYMPHOCYTES ABSOLUTE COUNT 0 83 Thousands/? ??L  0 60-4 47   MONOCYTES ABSOLUTE COUNT 0 19 Thousand/? ??L  0 17-1 22   EOSINOPHILS ABSOLUTE COUNT 0 06 Thousand/? ??L  0 00-0 61   BASOPHILS ABSOLUTE COUNT 0 03 Thousands/? ??L  0 00-0 10     (1) FERRITIN 21Nov2017 11:25AM Tomasz [employer] Order Number: [id number]     Test Name Result Flag Reference   FERRITIN 642 ng/mL H 8-388     (1) CA 27 29 27Oct2017 10:16AM Proothi, Jose     Test Name Result Flag Reference   CA 27 29(NEW) 1570 8 U/mL H 0 0-42 3     (1) COMPREHENSIVE METABOLIC PANEL 46HCA7490 49:65BN Proothi, Jose     Test Name Result Flag Reference   GLUCOSE,RANDM 356 mg/dL H      If the patient is fasting, the ADA then defines impaired fasting glucose as > 100 mg/dL and diabetes as > or equal to 123 mg/dL  Specimen collection should occur prior to Sulfasalazine administration due to the potential for falsely depressed results  Specimen collection should occur prior to Sulfapyridine administration due to the potential for falsely elevated results  SODIUM 135 mmol/L L 136-145   POTASSIUM 4 8 mmol/L  3 5-5 3   CHLORIDE 104 mmol/L  100-108   CARBON DIOXIDE 26 mmol/L  21-32   ANION GAP (CALC) 5 mmol/L  4-13   BLOOD UREA NITROGEN 33 mg/dL H 5-25   CREATININE 1 99 mg/dL H 0 60-1 30   Standardized to IDMS reference method   CALCIUM 9 1 mg/dL  8 3-10 1   BILI, TOTAL 0 48 mg/dL  0 20-1 00   ALK PHOSPHATAS 94 U/L     ALT (SGPT) 26 U/L  12-78   Specimen collection should occur prior to Sulfasalazine and/or Sulfapyridine administration due to the potential for falsely depressed results  AST(SGOT) 27 U/L  5-45   Specimen collection should occur prior to Sulfasalazine administration due to the potential for falsely depressed results  ALBUMIN 3 3 g/dL L 3 5-5 0   TOTAL PROTEIN 7 0 g/dL  6 4-8 2   eGFR 22 ml/min/1 73sq [facility] Disease Education Program recommendations are as follows: GFR calculation is accurate only with a steady state creatinine Chronic Kidney disease less than 60 ml/min/1 73 sq  meters Kidney failure less than 15 ml/min/1 73 sq  meters  (1) COMPREHENSIVE METABOLIC PANEL 11SJV6188 28:72IR Abrahan Delaney    Order Number: [id number]     Test Name Result Flag Reference   GLUCOSE,RANDM 132 mg/dL       If the patient is fasting, the ADA then defines impaired fasting glucose as > 100 mg/dL and diabetes as > or equal to 123 mg/dL  Specimen collection should occur prior to Sulfasalazine administration due to the potential for falsely depressed results  Specimen collection should occur prior to Sulfapyridine administration due to the potential for falsely elevated results  SODIUM 140 mmol/L  136-145   POTASSIUM 5 0 mmol/L  3 5-5 3   Slightly Hemolyzed;  Results May be Affected   CHLORIDE 107 mmol/L  100-108   CARBON DIOXIDE 22 mmol/L  21-32   ANION GAP (CALC) 11 mmol/L  4-13   BLOOD UREA NITROGEN 38 mg/dL H 5-25   CREATININE 2 07 mg/dL H 0 60-1 30   Standardized to IDMS reference method   CALCIUM 9 2 mg/dL  8 3-10 1   BILI, TOTAL 0 39 mg/dL  0 20-1 00   ALK PHOSPHATAS 79 U/L     ALT (SGPT) 23 U/L  12-78   Specimen collection should occur prior to Sulfasalazine and/or Sulfapyridine administration due to the potential for falsely depressed results  AST(SGOT) 39 U/L  5-45     Slightly Hemolyzed; Results May be Affected Specimen collection should occur prior to Sulfasalazine administration due to the potential for falsely depressed results  ALBUMIN 3 5 g/dL  3 5-5 0   TOTAL PROTEIN 7 2 g/dL  6 4-8 2   eGFR 21 ml/min/1 73sq m       [facility] Disease Education Program recommendations are as follows: GFR calculation is accurate only with a steady state creatinine Chronic Kidney disease less than 60 ml/min/1 73 sq  meters Kidney failure less than 15 ml/min/1 73 sq  meters       (1) CA 27 29 00Pjs5122 11:37AM Lorrie Salinas    Order Number: [id number]     Test Name Result Flag Reference   CA 27 29(NEW) 2148 6 U/mL H 0 0-42 3     Future Appointments    Date/Time Provider Specialty Site   01/23/2018 10:30 AM Lorrie Salinas [facility]       Signatures   Electronically signed by : Ivon Cortes [facility]; Nov 21 2017 11:22AM EST                       (Author)    Electronically signed by : Haidee Sever, MD; Nov 22 2017  6:30AM EST                       (Author)    Electronically signed by : Haidee Sever, MD; Nov 22 2017  6:30AM EST                       (Author)

## 2017-11-27 RX ORDER — LAMOTRIGINE 25 MG/1
250 TABLET ORAL ONCE
Status: COMPLETED | OUTPATIENT
Start: 2017-11-28 | End: 2017-11-28

## 2017-11-28 ENCOUNTER — HOSPITAL ENCOUNTER (OUTPATIENT)
Dept: INFUSION CENTER | Facility: CLINIC | Age: 82
Discharge: HOME | End: 2017-11-28
Payer: MEDICARE

## 2017-11-28 PROCEDURE — 96401 CHEMO ANTI-NEOPL SQ/IM: CPT

## 2017-11-28 PROCEDURE — 96402 CHEMO HORMON ANTINEOPL SQ/IM: CPT

## 2017-11-28 RX ADMIN — DENOSUMAB 120 MG: 120 INJECTION SUBCUTANEOUS at 12:00

## 2017-11-28 RX ADMIN — FULVESTRANT 250 MG: 50 INJECTION INTRAMUSCULAR at 12:00

## 2017-11-28 NOTE — PROGRESS NOTES
Patient to [facility] for Faslodex / Ming Trotter: Offers no complaints at present time: Lab work ( 11/21/17 ) reviewed: Calcium - 9 7: Confirms taking oral Calcium / Vitamin D at home: Injections given in LEXA / Buttocks ( LUQ / RUQ ) without incident: No adverse reactions noted: Verified follow up appt with patient: Declined AVS

## 2017-11-29 LAB — HFE GENE MUT ANL BLD/T: NORMAL

## 2017-12-01 DIAGNOSIS — C50.111 MALIGNANT NEOPLASM OF CENTRAL PORTION OF RIGHT FEMALE BREAST (HCC): ICD-10-CM

## 2017-12-01 DIAGNOSIS — C77.3 SECONDARY AND UNSPECIFIED MALIGNANT NEOPLASM OF AXILLA AND UPPER LIMB LYMPH NODES (HCC): ICD-10-CM

## 2017-12-21 ENCOUNTER — APPOINTMENT (OUTPATIENT)
Dept: LAB | Facility: MEDICAL CENTER | Age: 82
End: 2017-12-21
Payer: MEDICARE

## 2017-12-21 DIAGNOSIS — C77.3 SECONDARY AND UNSPECIFIED MALIGNANT NEOPLASM OF AXILLA AND UPPER LIMB LYMPH NODES (HCC): ICD-10-CM

## 2017-12-21 DIAGNOSIS — C50.111 MALIGNANT NEOPLASM OF CENTRAL PORTION OF RIGHT FEMALE BREAST (HCC): ICD-10-CM

## 2017-12-21 LAB
ALBUMIN SERPL BCP-MCNC: 3.2 G/DL (ref 3.5–5)
ALP SERPL-CCNC: 95 U/L (ref 46–116)
ALT SERPL W P-5'-P-CCNC: 30 U/L (ref 12–78)
ANION GAP SERPL CALCULATED.3IONS-SCNC: 7 MMOL/L (ref 4–13)
AST SERPL W P-5'-P-CCNC: 42 U/L (ref 5–45)
BILIRUB SERPL-MCNC: 0.39 MG/DL (ref 0.2–1)
BUN SERPL-MCNC: 35 MG/DL (ref 5–25)
CALCIUM SERPL-MCNC: 9.6 MG/DL (ref 8.3–10.1)
CHLORIDE SERPL-SCNC: 106 MMOL/L (ref 100–108)
CO2 SERPL-SCNC: 26 MMOL/L (ref 21–32)
CREAT SERPL-MCNC: 1.97 MG/DL (ref 0.6–1.3)
GFR SERPL CREATININE-BSD FRML MDRD: 22 ML/MIN/1.73SQ M
GLUCOSE SERPL-MCNC: 215 MG/DL (ref 65–140)
POTASSIUM SERPL-SCNC: 4.7 MMOL/L (ref 3.5–5.3)
PROT SERPL-MCNC: 6.6 G/DL (ref 6.4–8.2)
SODIUM SERPL-SCNC: 139 MMOL/L (ref 136–145)

## 2017-12-21 PROCEDURE — 80053 COMPREHEN METABOLIC PANEL: CPT

## 2017-12-21 PROCEDURE — 86300 IMMUNOASSAY TUMOR CA 15-3: CPT

## 2017-12-21 PROCEDURE — 36415 COLL VENOUS BLD VENIPUNCTURE: CPT

## 2017-12-22 LAB — CANCER AG27-29 SERPL-ACNC: 1482.2 U/ML (ref 0–42.3)

## 2017-12-22 RX ORDER — LAMOTRIGINE 25 MG/1
250 TABLET ORAL ONCE
Status: COMPLETED | OUTPATIENT
Start: 2017-12-26 | End: 2017-12-26

## 2017-12-26 ENCOUNTER — HOSPITAL ENCOUNTER (OUTPATIENT)
Dept: INFUSION CENTER | Facility: CLINIC | Age: 82
Discharge: HOME | End: 2017-12-28
Payer: MEDICARE

## 2017-12-26 VITALS
SYSTOLIC BLOOD PRESSURE: 141 MMHG | TEMPERATURE: 97.6 F | HEART RATE: 94 BPM | DIASTOLIC BLOOD PRESSURE: 65 MMHG | RESPIRATION RATE: 18 BRPM

## 2017-12-26 PROCEDURE — 96402 CHEMO HORMON ANTINEOPL SQ/IM: CPT

## 2017-12-26 PROCEDURE — 96401 CHEMO ANTI-NEOPL SQ/IM: CPT

## 2017-12-26 RX ADMIN — DENOSUMAB 120 MG: 120 INJECTION SUBCUTANEOUS at 10:21

## 2017-12-26 RX ADMIN — FULVESTRANT 250 MG: 50 INJECTION INTRAMUSCULAR at 10:32

## 2017-12-26 RX ADMIN — FULVESTRANT 250 MG: 50 INJECTION INTRAMUSCULAR at 10:33

## 2017-12-26 NOTE — PLAN OF CARE
Problem: Potential for Falls  Goal: Patient will remain free of falls  INTERVENTIONS:  - Assess patient frequently for physical needs  -  Identify cognitive and physical deficits and behaviors that affect risk of falls    -  [place] fall precautions as indicated by assessment   - Educate patient/family on patient safety including physical limitations  - Instruct patient to call for assistance with activity based on assessment  - Modify environment to reduce risk of injury  - Consider OT/PT consult to assist with strengthening/mobility   Outcome: Progressing

## 2017-12-26 NOTE — PROGRESS NOTES
Pt is here for faslodex and xgeva  She offer no complaints at this time  Labs meet parameters for xgeva  faslodex given in each buttocks and xgeva given in left arm and pt tolerated it well  Next appointment confirmed   Pt declined avs

## 2018-01-09 NOTE — MISCELLANEOUS
Message   Recorded as Task   Date: 07/24/2017 12:52 PM, Created By: Saman Castro   Task Name: Care Coordination   Assigned To: Radha Broussard   Regarding Patient: Esvin Berkowitz, Status: Active   CommentCarcharisse Ba - 24 Jul 2017 12:52 PM     TASK CREATED  Caller: Dennis Marin; Care Coordination; [phone]  Sara Randolph / Heladio Ribera / DR TATE PT CALLED  YANCY HAS QUESTIONS ABOUT HER MEDICATIONS, PLEASE CALL  Jessica Stiles - 24 Jul 2017 1:01 PM     TASK REASSIGNED: Previously Assigned To Jessica Stiles  Please call to discuss Remeron  spoke to patient's daughter about mother  has mouth sores/discomfort that's affecting her mouth  will send in magic mouthwash  feels that she is more fatigued with Remeron and would like to discontinue  Advised to take 1 tablet every other day for 1 week then can discontinue  call if order needs to be sent to assisted living        Signatures   Electronically signed by : LAYA Garcia; Jul 24 2017  4:14PM EST                       (Author)

## 2018-01-11 NOTE — PROGRESS NOTES
Discussion/Summary  Social Work-Discussion Summary [facility]: Patient is being seen for a distress screenning assessment  SILVIOW reviewed pt's distress thermometer completed by pt on 3/09/2016 in med onc  Pt rated their distress as a 3/10 in the past week including today and identified âdepression and worry,â as emotional problems  LSW introduced herself and her role as a cancer care counselor to pt's power of  and daughter, Hossein Mack, on 3/16/2016 by phone  Hossein Mack explained that pt lives in a nursing home and she is pt's   Hossein Mack stated she believes pt is âhanging in thereâ and has âno pain,â in regards to her cancer treatment  Per DQD'S request ALLYSON mailed information and contact information for [facility]  LALYSON is available to provide counseling as/if needed to pt and her family          Signatures   Electronically signed by : ALLYSON Everett; Mar 17 2016 11:30AM EST                       (Author)

## 2018-01-11 NOTE — MISCELLANEOUS
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)   6  Malignant neoplasm of central portion of right female breast (174 1) (C50 111)    Current Meds   1  AmLODIPine Besylate 2 5 MG Oral Tablet; Therapy: 43MJT6403 to Recorded   2  Letrozole 2 5 MG Oral Tablet; 1 tab PO daily; Therapy: 51EIG3257 to (Last Rx:29Mar2016)  Requested for: 29Mar2016; Status:   ACTIVE - Retrospective By Protocol Authorization Ordered   3  NovoLIN N 100 UNIT/ML Subcutaneous Suspension; Therapy: 92MIL7523 to Recorded    Allergies    1   No Known Drug Allergies    Signatures   Electronically signed by : Laci Lui RN; Mar 29 2016  4:03PM EST                       (Author)

## 2018-01-13 VITALS
HEART RATE: 112 BPM | BODY MASS INDEX: 33.29 KG/M2 | DIASTOLIC BLOOD PRESSURE: 74 MMHG | SYSTOLIC BLOOD PRESSURE: 116 MMHG | RESPIRATION RATE: 18 BRPM | HEIGHT: 61 IN | TEMPERATURE: 98.3 F | OXYGEN SATURATION: 97 % | WEIGHT: 176.31 LBS

## 2018-01-13 VITALS
WEIGHT: 174 LBS | DIASTOLIC BLOOD PRESSURE: 60 MMHG | TEMPERATURE: 98.2 F | SYSTOLIC BLOOD PRESSURE: 122 MMHG | RESPIRATION RATE: 16 BRPM | OXYGEN SATURATION: 98 % | HEIGHT: 61 IN | HEART RATE: 121 BPM | BODY MASS INDEX: 32.85 KG/M2

## 2018-01-14 VITALS
TEMPERATURE: 96.8 F | SYSTOLIC BLOOD PRESSURE: 120 MMHG | OXYGEN SATURATION: 97 % | RESPIRATION RATE: 16 BRPM | WEIGHT: 169.2 LBS | BODY MASS INDEX: 31.95 KG/M2 | HEIGHT: 61 IN | DIASTOLIC BLOOD PRESSURE: 70 MMHG | HEART RATE: 100 BPM

## 2018-01-14 VITALS
SYSTOLIC BLOOD PRESSURE: 116 MMHG | RESPIRATION RATE: 16 BRPM | HEART RATE: 97 BPM | OXYGEN SATURATION: 100 % | BODY MASS INDEX: 31.72 KG/M2 | TEMPERATURE: 97.2 F | WEIGHT: 168 LBS | HEIGHT: 61 IN | DIASTOLIC BLOOD PRESSURE: 62 MMHG

## 2018-01-14 VITALS
OXYGEN SATURATION: 99 % | HEIGHT: 61 IN | SYSTOLIC BLOOD PRESSURE: 142 MMHG | BODY MASS INDEX: 31.91 KG/M2 | DIASTOLIC BLOOD PRESSURE: 78 MMHG | RESPIRATION RATE: 15 BRPM | HEART RATE: 94 BPM | TEMPERATURE: 97.1 F | WEIGHT: 169 LBS

## 2018-01-14 VITALS
BODY MASS INDEX: 31.72 KG/M2 | SYSTOLIC BLOOD PRESSURE: 132 MMHG | OXYGEN SATURATION: 99 % | HEART RATE: 87 BPM | WEIGHT: 168 LBS | TEMPERATURE: 96.6 F | DIASTOLIC BLOOD PRESSURE: 78 MMHG | HEIGHT: 61 IN | RESPIRATION RATE: 16 BRPM

## 2018-01-15 NOTE — MISCELLANEOUS
Message   Recorded as Task   Date: 06/29/2017 07:29 AM, Created By: Mariah Medina   Task Name: Call Back   Assigned To: Tamara Starks   Regarding Patient: Jonathon Sebastian, Status: Active   CommentSapphire Almonte - 29 Jun 2017 7:29 AM     TASK CREATED  Please call patient's daughter, Ivanna Burks, and tell her that her mother needs to take vitamin B12 over the counter  I just got a lab result back stating that this level was low  This low level is contributing to her anemia  Familia Amaro - 29 Jun 2017 7:54 AM     TASK REPLIED TO: Previously Assigned To Tamara Starks  spoke to Ivanna Burks she will pick it up and give her mother 1 a day  noted      Active Problems    1  Anemia (285 9) (D64 9)   2  Anemia, deficiency (281 9) (D53 9)   3  Anorexia (783 0) (R63 0)   4  Benign essential hypertension (401 1) (I10)   5  Bone metastases (198 5) (C79 51)   6  Cancer, metastatic to lung (197 0) (C78 00)   7  Contusion of lower leg, right (924 10) (S80 11XA)   8  Diabetes mellitus (250 00) (E11 9)   9  Leg injury (959 7) (S89 90XA)   10  Liver metastases (197 7) (C78 7)   11  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)   12  Malignant neoplasm of central portion of right female breast (174 1) (C50 111)    Current Meds   1  AmLODIPine Besylate 2 5 MG Oral Tablet; Therapy: 72YZK2674 to Recorded   2  Calcium 1000 + D 1000-800 MG-UNIT Oral Tablet; Therapy: (Recorded:08Jun2017) to Recorded   3  Faslodex 125 MG/2 5ML SOLN;   Therapy: (Recorded:25Jun2017) to Recorded   4  Ibrance 100 MG Oral Capsule; 1 TAB PO DAILY FOR 21 DAYS THEN 7 OFF  Requested   for: 28Jun2017; Last Rx:28Jun2017 Ordered   5  Iron Supplement 325 (65 Fe) MG TABS; Therapy: (Randy Anaya) to Recorded   6  Lantus SoloStar 100 UNIT/ML SOLN;   Therapy: (Recorded:25Jun2017) to Recorded   7  Mirtazapine 15 MG Oral Tablet; TAKE 1 TABLET AT BEDTIME;    Therapy: 18RDA4569 to (Evaluate:69Idu6147) Requested for: 27Jun2017; Last   OX:[id number] Ordered   8  NovoLOG 100 UNIT/ML Subcutaneous Solution; INJECT SUBCUTANEOUSLY AS   DIRECTED; Therapy: (928 68 728) to Recorded   9  Xgeva 120 MG/1 7ML Subcutaneous Solution; Therapy: (Basilia Tim) to Recorded    Allergies    1   No Known Drug Allergies    Signatures   Electronically signed by : Paresh Simpson MA; Jun 29 2017  7:54AM EST                       (Author)

## 2018-01-16 NOTE — MISCELLANEOUS
Message  spoke to patient's daughter Danielle Root in regards to mother's most recent hgb of 9 9, previously 12 4  Patient states she is not aware of her mother having bleeding symptoms  No black stool or blood in the stool that she is aware of  Will mail ferritin, iron studies, folate, mma, occult stool test to patient's daughter  Daughter states she will take her mother next week to have labs done  Plan  Anemia    · (1) FERRITIN; Status:Active; Requested RJR:72ADG5109;    · (1) IRON PANEL; Status:Active; Requested JGU:[id number];    · (1) METHYLMELONIC ACID; Status:Active; Requested BECCA:12EJM0304; Anemia, Anemia, deficiency    · (1) FOLATE; Status:Active; Requested TGU:73JXA5917; Anemia, deficiency    · (1) OCCULT BLOOD, FECAL IMMUNOCHEMICAL TEST; Status:Active;  Requested  MDN:91DKY6654;     Signatures   Electronically signed by : Julien Arechiga, [facility]; Jun 13 2017  5:47PM EST                       (Author)

## 2018-01-17 NOTE — MISCELLANEOUS
Message   Recorded as Task   Date: 06/29/2017 12:55 PM, Created By: Alessandro Da Silva   Task Name: Call Back   Assigned To: Jessica Stiles   Regarding Patient: Luisa Maher, Status: Active   CommentJudson Elier - 29 Jun 2017 12:55 PM     TASK CREATED  Caller: [facility]; [phone]  Calling to verify that pt's Ibrance script was changed from 125mg to 100mg? 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)   12  Malignant neoplasm of central portion of right female breast (174 1) (C50 111)    Current Meds   1  AmLODIPine Besylate 2 5 MG Oral Tablet; Therapy: [id number] to Recorded   2  Calcium 1000 + D 1000-800 MG-UNIT Oral Tablet; Therapy: (Recorded:08Jun2017) to Recorded   3  Faslodex 125 MG/2 5ML SOLN;   Therapy: (Recorded:25Jun2017) to Recorded   4  Ibrance 100 MG Oral Capsule; 1 TAB PO DAILY FOR 21 DAYS THEN 7 OFF  Requested   for: 28Jun2017; Last Rx:28Jun2017 Ordered   5  Iron Supplement 325 (65 Fe) MG TABS; Therapy: (Bonilla Avila) to Recorded   6  Lantus SoloStar 100 UNIT/ML SOLN;   Therapy: (Recorded:25Jun2017) to Recorded   7  Mirtazapine 15 MG Oral Tablet; TAKE 1 TABLET AT BEDTIME; Therapy: 01YEO7631 to (Prince Clements)  Requested for: 27Jun2017; Last   IL:64MNI2446 Ordered   8  NovoLOG 100 UNIT/ML Subcutaneous Solution; INJECT SUBCUTANEOUSLY AS   DIRECTED;    Therapy: (Recorded:07Mar2017) to Recorded 9  Xgeva 120 MG/1 7ML Subcutaneous Solution; Therapy: (Rd Mates) to Recorded    Allergies    1   No Known Drug Allergies    Signatures   Electronically signed by : Peyton Paniagua RN; Jun 29 2017  1:07PM EST                       (Author)

## 2018-01-17 NOTE — MISCELLANEOUS
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)   10  Leg injury (959 7) (S89 90XA)   11  Liver metastases (197 7) (C78 7)   12  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)   13  Malignant neoplasm of central portion of right female breast (174 1) (C50 111)   14  Mouth sore (528 9) (K13 79)    Current Meds   1  AmLODIPine Besylate 2 5 MG Oral Tablet; Therapy: 71WVG8903 to Recorded   2  Calcium 1000 + D 1000-800 MG-UNIT Oral Tablet; Therapy: (Recorded:2017) to Recorded   3  Faslodex 125 MG/2 5ML SOLN;   Therapy: (Recorded:2017) to Recorded   4  Ibrance 100 MG Oral Capsule; 1 TAB PO DAILY FOR 21 DAYS THEN 7 OFF  Requested   for: 2017; Last Rx:2017 Ordered   5  Iron Supplement 325 (65 Fe) MG TABS; Therapy: (Danielle Yi) to Recorded   6  Lantus SoloStar 100 UNIT/ML SOLN;   Therapy: (Recorded:2017) to Recorded   7  Mirtazapine 15 MG Oral Tablet; TAKE 1 TABLET AT BEDTIME; Therapy: 59AFL3437 to ([phone])  Requested for: 2017; Last   [id number] Ordered   8  NovoLOG 100 UNIT/ML Subcutaneous Solution; INJECT SUBCUTANEOUSLY AS   DIRECTED; Therapy: (955 72 122) to Recorded   9  SLPG Magic Mouthwash 1:1:1 maalox/diphenhydramine/lidocaine; SWISH AND SPIT 30   ML 1-3 TIMES PER DAY AS NEEDED FOR MOUTH PAIN;   Therapy: 54LCI0778 to (Last Rx:2017) Ordered   10  Xgeva 120 MG/1 7ML Subcutaneous Solution;     Therapy: (Danielle Yi) to Recorded    Allergies    1   No Known Drug Allergies    Signatures   Electronically signed by : Ruby Patel RN; Oct 30 2017  1:43PM EST                       (Author)

## 2018-01-20 ENCOUNTER — APPOINTMENT (OUTPATIENT)
Dept: LAB | Facility: MEDICAL CENTER | Age: 83
End: 2018-01-20
Payer: MEDICARE

## 2018-01-20 DIAGNOSIS — C79.51 SECONDARY MALIGNANT NEOPLASM OF BONE (HCC): ICD-10-CM

## 2018-01-20 DIAGNOSIS — C50.111 MALIGNANT NEOPLASM OF CENTRAL PORTION OF RIGHT FEMALE BREAST (HCC): ICD-10-CM

## 2018-01-20 LAB
ALBUMIN SERPL BCP-MCNC: 3.1 G/DL (ref 3.5–5)
ALP SERPL-CCNC: 115 U/L (ref 46–116)
ALT SERPL W P-5'-P-CCNC: 43 U/L (ref 12–78)
ANION GAP SERPL CALCULATED.3IONS-SCNC: 6 MMOL/L (ref 4–13)
AST SERPL W P-5'-P-CCNC: 50 U/L (ref 5–45)
BILIRUB SERPL-MCNC: 0.6 MG/DL (ref 0.2–1)
BUN SERPL-MCNC: 39 MG/DL (ref 5–25)
CALCIUM SERPL-MCNC: 9 MG/DL (ref 8.3–10.1)
CANCER AG27-29 SERPL-ACNC: 1717.7 U/ML (ref 0–42.3)
CHLORIDE SERPL-SCNC: 104 MMOL/L (ref 100–108)
CO2 SERPL-SCNC: 28 MMOL/L (ref 21–32)
CREAT SERPL-MCNC: 2.16 MG/DL (ref 0.6–1.3)
GFR SERPL CREATININE-BSD FRML MDRD: 20 ML/MIN/1.73SQ M
GLUCOSE P FAST SERPL-MCNC: 291 MG/DL (ref 65–99)
POTASSIUM SERPL-SCNC: 4.8 MMOL/L (ref 3.5–5.3)
PROT SERPL-MCNC: 6.7 G/DL (ref 6.4–8.2)
SODIUM SERPL-SCNC: 138 MMOL/L (ref 136–145)

## 2018-01-20 PROCEDURE — 36415 COLL VENOUS BLD VENIPUNCTURE: CPT

## 2018-01-20 PROCEDURE — 80053 COMPREHEN METABOLIC PANEL: CPT

## 2018-01-20 PROCEDURE — 86300 IMMUNOASSAY TUMOR CA 15-3: CPT

## 2018-01-22 VITALS
TEMPERATURE: 96.8 F | HEIGHT: 61 IN | SYSTOLIC BLOOD PRESSURE: 148 MMHG | DIASTOLIC BLOOD PRESSURE: 80 MMHG | WEIGHT: 182.31 LBS | OXYGEN SATURATION: 98 % | HEART RATE: 114 BPM | RESPIRATION RATE: 16 BRPM | BODY MASS INDEX: 34.42 KG/M2

## 2018-01-22 RX ORDER — LAMOTRIGINE 25 MG/1
250 TABLET ORAL ONCE
Status: COMPLETED | OUTPATIENT
Start: 2018-01-23 | End: 2018-01-23

## 2018-01-23 ENCOUNTER — ALLSCRIPTS OFFICE VISIT (OUTPATIENT)
Dept: OTHER | Facility: OTHER | Age: 83
End: 2018-01-23

## 2018-01-23 ENCOUNTER — HOSPITAL ENCOUNTER (OUTPATIENT)
Dept: INFUSION CENTER | Facility: CLINIC | Age: 83
Discharge: HOME | End: 2018-01-23
Payer: MEDICARE

## 2018-01-23 DIAGNOSIS — C50.111 MALIGNANT NEOPLASM OF CENTRAL PORTION OF RIGHT FEMALE BREAST (HCC): ICD-10-CM

## 2018-01-23 DIAGNOSIS — C78.00 SECONDARY MALIGNANT NEOPLASM OF LUNG (HCC): ICD-10-CM

## 2018-01-23 PROCEDURE — 96401 CHEMO ANTI-NEOPL SQ/IM: CPT

## 2018-01-23 PROCEDURE — 96402 CHEMO HORMON ANTINEOPL SQ/IM: CPT

## 2018-01-23 RX ADMIN — FULVESTRANT 250 MG: 50 INJECTION INTRAMUSCULAR at 11:50

## 2018-01-23 RX ADMIN — DENOSUMAB 120 MG: 120 INJECTION SUBCUTANEOUS at 11:50

## 2018-01-23 NOTE — PROGRESS NOTES
Patient to [facility] for Faslodex / Ashley Reno: Offers no complaints at present time: Lab work ( 01/20/18 ) reviewed: Calcium - 9 0: Within parameters to treat: Confirms taking oral Calcium / Vitamin D at home: Injections given in EDDIE / Hu Burgos ( LUQ / Blayne Raymundo ) without incident: No adverse reactions noted: Verified follow up appt with patient: Declined AVS

## 2018-01-24 NOTE — PROGRESS NOTES
Assessment   1  Bone metastases (198 5) (C79 51)   2  Cancer, metastatic to lung (197 0) (C78 00)   3  Liver metastases (197 7) (C78 7)   4  Malignant neoplasm of central portion of right female breast (174 1) (C50 111)    Plan   Cancer, metastatic to lung, Malignant neoplasm of central portion of right female breast    · (1) CA 27 29; Status:Active; Requested MDR:[id number]; Perform:[facility]; XPJ:[id number];GSQNWZP; For:Cancer, metastatic to lung, Malignant neoplasm of central portion of right female breast; Ordered By:Radha Broussard;   · (1) CBC/PLT/DIFF; Status:Active; Requested [id number]; Perform:[facility]; IUD:36VNZ4130;SSGSZPO; For:Cancer, metastatic to lung, Malignant neoplasm of central portion of right female breast; Ordered By:Radha Broussard;   · (1) COMPREHENSIVE METABOLIC PANEL; Status:Active; Requested PVI:83VYO0463; Perform:[facility]; OPQ:36JQM2156;UGTAOVR; For:Cancer, metastatic to lung, Malignant neoplasm of central portion of right female breast; Ordered By:Radha Broussard;   · Follow-up visit in 1 month Evaluation and Treatment  Follow-up  Status: Complete  Done:    67OJN2339   Ordered; For: Cancer, metastatic to lung, Malignant neoplasm of central portion of right female breast; Ordered By: Gayla Cummings Performed:  Due: 14QTC3910; Last Updated By: Don Patel; 2018 11:04:11 AM  Malignant neoplasm of central portion of right female breast    · * NM PET CT SKULL BASE TO MID THIGH; Status:Active; Requested for:79Zse9961    09:00AM;    Perform:[facility]; ZNV:46EBF9659; Last Updated By:Tatianna Hilton; 2018 11:00:34 AM;Ordered; For:Malignant neoplasm of central portion of right female breast; Ordered By:Radha Broussard; Discussion/Summary   Discussion Summary:    79 y/o female presents for f/u regarding hormone receptor positive breast cancer, stage IV as outlined in the HPI   She continues on Lignite, Faslodex, and Xgeva  She is tolerating this well  She does not offer complaints today  recent CA 27 29 from January 2018, 1717 7; previously 1482 2 in December 2017  to increasing in CA 27 29, patient will have PET/CT  also, repeat CA 27 29 prior to follow-up  Plan to continue above treatment at this time  Favio Po one time patient was found to have elevated ferritin  Hemochromatosis investigation was negative  and daughter voiced understanding and agreement in above discussion  THey are aware to contact us with questions/symptoms in the interim  Counseling Documentation With Imm: The patient, patient's family was counseled regarding diagnostic results,-- instructions for management,-- patient and family education,-- impressions  total time of encounter was 20 minutes-- and-- minutes was spent counseling  Goals and Barriers: The patient has the current Goals: Prolongation of survival  The patent has the current Barriers: None  Patient's Capacity to Self-Care: Patient agrees and allows to involve family/caregiver in development of care plan: Daughter  Medication SE Review and Pt Understands Tx: Possible side effects of new medications were reviewed with the patient/guardian today  The treatment plan was reviewed with the patient/guardian  The patient/guardian understands and agrees with the treatment plan    Self Referrals:    Self Referrals: No    Understands and agrees with treatment plan: The treatment plan was reviewed with the patient/guardian  The patient/guardian understands and agrees with the treatment plan      History of Present Illness   HPI: 81 y/o female presents for f/u regarding metastatic breast cancer, hormone receptors positive and HER-2 negative Since April 2016 patient has been on Femara and Xgeva for metastatic, receptor positive right breast cancer to right axillary lymph node, lungs and bones  She responded  Her performance status improved   Tumor marker CA 27 29 came down from 1455 in March 2016 to 300 range in Dec 2016  In January CA 27 29 increased to 532 1  PET/CT scan in Feb 2017 showed improvement in the right breast and axilla area mass and lung nodules but increased disease in the bones  At that time patient did not have increased bone pains  CA 27 29 in May 2017 was reported as greater than 450  Due to inconclusive lab results, PET/CT was performed  This showed new abnormal foci of activity within the liver, diffuse intense uptake within the osseous structures; also new foci of increased uptake seen within the osseous structures  Stable small pulmonary nodules and right breast mass as well as right axillary lymph node  Due to progression noted in scan, letrozole discontinued  Faslodex and Ibrance initiated continued  Xgeva continued  2017- patient was experiencing increase in fatigue, loss of appetite  Lack of interest in activities  Dose of Ibrance was decrease to 100 mg    Current Therapy: Xgeva 120 mg SQ every 4 weeks  100 mg PO daily days 1-21 every 28 days  500 mg IM monthly    Interval History: no complaints today good      Review of Systems   Complete-Female:      Constitutional: No fever, no chills, feels well, no tiredness, no recent weight gain or weight loss  Eyes: No complaints of eye pain, no red eyes, no eyesight problems, no discharge, no dry eyes, no itching of eyes  ENT: no complaints of earache, no loss of hearing, no nose bleeds, no nasal discharge, no sore throat, no hoarseness  Cardiovascular: No complaints of slow heart rate, no fast heart rate, no chest pain, no palpitations, no leg claudication, no lower extremity edema  Respiratory: No complaints of shortness of breath, no wheezing, no cough, no SOB on exertion, no orthopnea, no PND  Gastrointestinal: No complaints of abdominal pain, no constipation, no nausea or vomiting, no diarrhea, no bloody stools        Genitourinary: No complaints of dysuria, no incontinence, no pelvic pain, no dysmenorrhea, no vaginal discharge or bleeding  Musculoskeletal: No complaints of arthralgias, no myalgias, no joint swelling or stiffness, no limb pain or swelling  Integumentary: No complaints of skin rash or lesions, no itching, no skin wounds, no breast pain or lump  Neurological: No complaints of headache, no confusion, no convulsions, no numbness, no dizziness or fainting, no tingling, no limb weakness, no difficulty walking  Psychiatric: no anxiety-- and-- no depression  Endocrine: no muscle weakness  no feelings of weakness      Hematologic/Lymphatic: No complaints of swollen glands, no swollen glands in the neck, does not bleed easily, does not bruise easily  ROS Reviewed:    ROS reviewed  Active Problems   1  Anemia (285 9) (D64 9)   2  Anemia, deficiency (281 9) (D53 9)   3  Anorexia (783 0) (R63 0)   4  Benign essential hypertension (401 1) (I10)   5  Bone metastases (198 5) (C79 51)   6  Cancer, metastatic to lung (197 0) (C78 00)   7  Contusion of lower leg, right (924 10) (S80 11XA)   8  Diabetes mellitus (250 00) (E11 9)   9  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)   10  Leg injury (959 7) (S89 90XA)   11  Liver metastases (197 7) (C78 7)   12  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)   13  Malignant neoplasm of central portion of right female breast (174 1) (C50 111)   14  Mouth sore (528 9) (K13 79)    Surgical History   Surgical History Reviewed: The surgical history was reviewed and updated today  Family History   Mother    1  Family history of malignant neoplasm of uterus (V16 49) (Z80 49)  Sister    2  Family history of malignant neoplasm of uterus (V16 49) (Z80 49)  Family History Reviewed: The family history was reviewed and updated today  Social History    · Former smoker (X59 42) (G12 790)   · No alcohol use  Social History Reviewed: The social history was reviewed and updated today   The social history was reviewed and is unchanged  Current Meds    1  AmLODIPine Besylate 2 5 MG Oral Tablet; Therapy: 64AQC5456 to Recorded   2  Calcium 1000 + D 1000-800 MG-UNIT Oral Tablet; Therapy: (Recorded:08Jun2017) to Recorded   3  Faslodex 125 MG/2 5ML SOLN;     Therapy: (Recorded:25Jun2017) to Recorded   4  Ibrance 100 MG Oral Capsule; 1 TAB PO DAILY FOR 21 DAYS THEN 7 OFF  Requested     for: 33GOY1556; Last Rx:22Nov2017 Ordered   5  Iron Supplement 325 (65 Fe) MG TABS; Therapy: (Harden Retort) to Recorded   6  Lantus SoloStar 100 UNIT/ML SOLN;     Therapy: (Recorded:25Jun2017) to Recorded   7  Mirtazapine 15 MG Oral Tablet; TAKE 1 TABLET AT BEDTIME; Therapy: 86XSU0969 to (Ami Cortes)  Requested for: 27Jun2017; Last     CT:86UUY6823 Ordered   8  NovoLOG 100 UNIT/ML Subcutaneous Solution; INJECT SUBCUTANEOUSLY AS     DIRECTED; Therapy: (21 ) to Recorded   9  SLPG Magic Mouthwash 1:1:1 maalox/diphenhydramine/lidocaine; SWISH AND SPIT 30     ML 1-3 TIMES PER DAY AS NEEDED FOR MOUTH PAIN;     Therapy: 22JDQ4636 to (Last Rx:25Olq4003) Ordered   10  Xgeva 120 MG/1 7ML Subcutaneous Solution; Therapy: (Harden Retort) to Recorded  Medication List Reviewed: The medication list was reviewed and updated today  Allergies   1  No Known Drug Allergies    Vitals   Vital Signs    Recorded: 92LYB7545 10:30AM   Temperature 97 1 F   Heart Rate 93   Respiration 16   Systolic 000   Diastolic 76   Height 5 ft 1 in   Weight 171 lb 5 oz   BMI Calculated 32 37   BSA Calculated 1 77   O2 Saturation 98   Pain Scale 0     Physical Exam        Constitutional      General appearance: No acute distress, well appearing and well nourished  Eyes      Conjunctiva and lids: No swelling, erythema or discharge  Ears, Nose, Mouth, and Throat      External inspection of ears and nose: Normal        Oropharynx: Normal with no erythema, edema, exudate or lesions         Pulmonary Respiratory effort: No increased work of breathing or signs of respiratory distress  Auscultation of lungs: Clear to auscultation  Cardiovascular      Auscultation of heart: Normal rate and rhythm, normal S1 and S2, without murmurs  Examination of extremities for edema and/or varicosities: Normal        Abdomen      Abdomen: Non-tender, no masses  Lymphatic      Palpation of lymph nodes in neck: No lymphadenopathy  Musculoskeletal      Gait and station: Abnormal  -- In wheelchair, ambulates with walker at assisted living  Skin      Skin and subcutaneous tissue: Normal without rashes or lesions  Neurologic      Cranial nerves: Cranial nerves 2-12 intact  Psychiatric      Orientation to person, place, and time: Normal        Mood and affect: Normal            ECOG 1       Results/Data   (1) COMPREHENSIVE METABOLIC PANEL 99NVF1722 02:88XH Erendira Smith Order Number: [id number]      Test Name Result Flag Reference   SODIUM 138 mmol/L  136-145   POTASSIUM 4 8 mmol/L  3 5-5 3   CHLORIDE 104 mmol/L  100-108   CARBON DIOXIDE 28 mmol/L  21-32   ANION GAP (CALC) 6 mmol/L  4-13   BLOOD UREA NITROGEN 39 mg/dL H 5-25   CREATININE 2 16 mg/dL H 0 60-1 30   Standardized to IDMS reference method   CALCIUM 9 0 mg/dL  8 3-10 1   BILI, TOTAL 0 60 mg/dL  0 20-1 00   ALK PHOSPHATAS 115 U/L     ALT (SGPT) 43 U/L  12-78   Specimen collection should occur prior to Sulfasalazine and/or Sulfapyridine administration due to the potential for falsely depressed results  AST(SGOT) 50 U/L H 5-45   Specimen collection should occur prior to Sulfasalazine administration due to the potential for falsely depressed results     ALBUMIN 3 1 g/dL L 3 5-5 0   TOTAL PROTEIN 6 7 g/dL  6 4-8 2   eGFR 20 ml/min/1 73sq m     National Kidney Disease Education Program recommendations are as follows:     GFR calculation is accurate only with a steady state creatinine     Chronic Kidney disease less than 60 ml/min/1 73 sq  meters     Kidney failure less than 15 ml/min/1 73 sq  meters  GLUCOSE FASTING 291 mg/dL H 65-99   Specimen collection should occur prior to Sulfasalazine administration due to the potential for falsely depressed results  Specimen collection should occur prior to Sulfapyridine administration due to the potential for falsely elevated results  (1) CA 27 29 20Jan2018 10:14AM Kyler Pae   TW Order Number: [id number]      Test Name Result Flag Reference   CA 27 29(NEW) 1717 7 U/mL H 0 0-42 3   Diluted      (1) CA 27 29 82Gkb5192 10:22AM Kyler Pae      Test Name Result Flag Reference   CA 27 29(NEW) 1482 2 U/mL H 0 0-42  3      Signatures    Electronically signed by : LAYA Razo; Jan 23 2018 11:28AM EST                       (Author)     Electronically signed by : Brennon Jaquez MD; Jan 23 2018  5:06PM EST                       (Author)     Electronically signed by : Brennon Jaquez MD; Jan 23 2018  5:06PM EST                       (Author)

## 2018-02-15 ENCOUNTER — TRANSCRIBE ORDERS (OUTPATIENT)
Dept: ADMINISTRATIVE | Facility: HOSPITAL | Age: 83
End: 2018-02-15

## 2018-02-15 ENCOUNTER — APPOINTMENT (OUTPATIENT)
Dept: LAB | Facility: MEDICAL CENTER | Age: 83
End: 2018-02-15
Payer: MEDICARE

## 2018-02-15 DIAGNOSIS — C78.7 SECONDARY MALIGNANT NEOPLASM OF LIVER AND INTRAHEPATIC BILE DUCT (HCC): ICD-10-CM

## 2018-02-15 DIAGNOSIS — C78.00 SECONDARY MALIGNANT NEOPLASM OF LUNG (HCC): ICD-10-CM

## 2018-02-15 DIAGNOSIS — C50.111 MALIGNANT NEOPLASM OF CENTRAL PORTION OF RIGHT FEMALE BREAST (HCC): ICD-10-CM

## 2018-02-15 LAB
ALBUMIN SERPL BCP-MCNC: 2.9 G/DL (ref 3.5–5)
ALP SERPL-CCNC: 141 U/L (ref 46–116)
ALT SERPL W P-5'-P-CCNC: 56 U/L (ref 12–78)
ANION GAP SERPL CALCULATED.3IONS-SCNC: 8 MMOL/L (ref 4–13)
AST SERPL W P-5'-P-CCNC: 62 U/L (ref 5–45)
BILIRUB SERPL-MCNC: 0.58 MG/DL (ref 0.2–1)
BUN SERPL-MCNC: 39 MG/DL (ref 5–25)
CALCIUM SERPL-MCNC: 9 MG/DL (ref 8.3–10.1)
CANCER AG27-29 SERPL-ACNC: 1659.4 U/ML (ref 0–42.3)
CHLORIDE SERPL-SCNC: 104 MMOL/L (ref 100–108)
CO2 SERPL-SCNC: 25 MMOL/L (ref 21–32)
CREAT SERPL-MCNC: 2.01 MG/DL (ref 0.6–1.3)
GFR SERPL CREATININE-BSD FRML MDRD: 21 ML/MIN/1.73SQ M
GLUCOSE P FAST SERPL-MCNC: 380 MG/DL (ref 65–99)
POTASSIUM SERPL-SCNC: 4.6 MMOL/L (ref 3.5–5.3)
PROT SERPL-MCNC: 6.5 G/DL (ref 6.4–8.2)
SODIUM SERPL-SCNC: 137 MMOL/L (ref 136–145)

## 2018-02-15 PROCEDURE — 86300 IMMUNOASSAY TUMOR CA 15-3: CPT

## 2018-02-15 PROCEDURE — 36415 COLL VENOUS BLD VENIPUNCTURE: CPT

## 2018-02-15 PROCEDURE — 85025 COMPLETE CBC W/AUTO DIFF WBC: CPT | Performed by: PHYSICIAN ASSISTANT

## 2018-02-15 PROCEDURE — 83540 ASSAY OF IRON: CPT | Performed by: PHYSICIAN ASSISTANT

## 2018-02-15 PROCEDURE — 80053 COMPREHEN METABOLIC PANEL: CPT

## 2018-02-15 PROCEDURE — 82746 ASSAY OF FOLIC ACID SERUM: CPT | Performed by: PHYSICIAN ASSISTANT

## 2018-02-15 PROCEDURE — 82728 ASSAY OF FERRITIN: CPT | Performed by: PHYSICIAN ASSISTANT

## 2018-02-15 PROCEDURE — 83550 IRON BINDING TEST: CPT | Performed by: PHYSICIAN ASSISTANT

## 2018-02-19 ENCOUNTER — TELEPHONE (OUTPATIENT)
Dept: HEMATOLOGY ONCOLOGY | Facility: CLINIC | Age: 83
End: 2018-02-19

## 2018-02-19 RX ORDER — LAMOTRIGINE 25 MG/1
250 TABLET ORAL ONCE
Status: COMPLETED | OUTPATIENT
Start: 2018-02-20 | End: 2018-02-20

## 2018-02-19 NOTE — TELEPHONE ENCOUNTER
I need to discuss with Julia  She can't have a CT with contrast due to her kidneys and without contrast may not be very helpful  We will call daughter back by end of the week  Keep March appt for now

## 2018-02-19 NOTE — TELEPHONE ENCOUNTER
Patient's daughter called to report that she had to cancel the patient's PET scan this coming Friday, 2/23/18, due to the patient's recent blood sugars being in the 300-400's  Patient is scheduled for an office visit with Dr Fauzia Irwin on 3/2/18  Patient's daughter wants to know if there is another test that can be done that won't increase her mother's sugars, and/or if the patient needs to cancel her 3/2/18 f/u

## 2018-02-20 ENCOUNTER — HOSPITAL ENCOUNTER (OUTPATIENT)
Dept: INFUSION CENTER | Facility: CLINIC | Age: 83
Discharge: HOME | End: 2018-02-20
Payer: MEDICARE

## 2018-02-20 VITALS
RESPIRATION RATE: 18 BRPM | TEMPERATURE: 97.7 F | HEART RATE: 92 BPM | DIASTOLIC BLOOD PRESSURE: 62 MMHG | SYSTOLIC BLOOD PRESSURE: 126 MMHG

## 2018-02-20 PROCEDURE — 96401 CHEMO ANTI-NEOPL SQ/IM: CPT

## 2018-02-20 RX ADMIN — FULVESTRANT 250 MG: 50 INJECTION INTRAMUSCULAR at 11:43

## 2018-02-20 RX ADMIN — DENOSUMAB 120 MG: 120 INJECTION SUBCUTANEOUS at 11:41

## 2018-02-20 NOTE — PLAN OF CARE
Problem: Potential for Falls  Goal: Patient will remain free of falls  INTERVENTIONS:  - Assess patient frequently for physical needs  -  Identify cognitive and physical deficits and behaviors that affect risk of falls    -  Greer fall precautions as indicated by assessment   - Educate patient/family on patient safety including physical limitations  - Instruct patient to call for assistance with activity based on assessment  - Modify environment to reduce risk of injury  - Consider OT/PT consult to assist with strengthening/mobility   Outcome: Progressing

## 2018-02-20 NOTE — PROGRESS NOTES
Pt  offers no complaints  Faslodex admin  IM in right and left buttock, Xgeva admin  in [facility] without incident  AVS declined  Next appt  confirmed

## 2018-02-23 NOTE — TELEPHONE ENCOUNTER
Spoke with patient's daughter, Arcelia Hoffmann, in regards to patient not being able to get her PET scan done  Patient is having her diabetes medications adjusted by the doctor at her assisted living facility  Arcelia Hoffmann was also made aware that the patient's tumor marker has decreased and that any future scans will be addressed with Dr Matthew Shi at the patient's next appointment

## 2018-03-15 ENCOUNTER — OFFICE VISIT (OUTPATIENT)
Dept: HEMATOLOGY ONCOLOGY | Facility: CLINIC | Age: 83
End: 2018-03-15
Payer: MEDICARE

## 2018-03-15 ENCOUNTER — TRANSCRIBE ORDERS (OUTPATIENT)
Dept: ADMINISTRATIVE | Facility: HOSPITAL | Age: 83
End: 2018-03-15

## 2018-03-15 ENCOUNTER — APPOINTMENT (OUTPATIENT)
Dept: LAB | Facility: MEDICAL CENTER | Age: 83
End: 2018-03-15
Payer: MEDICARE

## 2018-03-15 VITALS
TEMPERATURE: 97.8 F | DIASTOLIC BLOOD PRESSURE: 86 MMHG | OXYGEN SATURATION: 99 % | WEIGHT: 162.8 LBS | HEART RATE: 91 BPM | BODY MASS INDEX: 31.96 KG/M2 | RESPIRATION RATE: 16 BRPM | HEIGHT: 60 IN | SYSTOLIC BLOOD PRESSURE: 136 MMHG

## 2018-03-15 DIAGNOSIS — C50.111 MALIGNANT NEOPLASM OF CENTRAL PORTION OF RIGHT FEMALE BREAST, UNSPECIFIED ESTROGEN RECEPTOR STATUS (HCC): ICD-10-CM

## 2018-03-15 DIAGNOSIS — Z17.0 MALIGNANT NEOPLASM OF CENTRAL PORTION OF RIGHT BREAST IN FEMALE, ESTROGEN RECEPTOR POSITIVE (HCC): Primary | ICD-10-CM

## 2018-03-15 DIAGNOSIS — C78.7 SECONDARY MALIGNANT NEOPLASM OF LIVER (HCC): Primary | ICD-10-CM

## 2018-03-15 DIAGNOSIS — C79.51 BONE METASTASIS (HCC): ICD-10-CM

## 2018-03-15 DIAGNOSIS — C78.00 MALIGNANT NEOPLASM METASTATIC TO LUNG, UNSPECIFIED LATERALITY (HCC): ICD-10-CM

## 2018-03-15 DIAGNOSIS — C50.111 MALIGNANT NEOPLASM OF CENTRAL PORTION OF RIGHT BREAST IN FEMALE, ESTROGEN RECEPTOR POSITIVE (HCC): Primary | ICD-10-CM

## 2018-03-15 DIAGNOSIS — C77.3 MALIGNANT NEOPLASM METASTATIC TO LYMPH NODE OF AXILLA (HCC): ICD-10-CM

## 2018-03-15 LAB
ALBUMIN SERPL BCP-MCNC: 3.1 G/DL (ref 3.5–5)
ALP SERPL-CCNC: 190 U/L (ref 46–116)
ALT SERPL W P-5'-P-CCNC: 73 U/L (ref 12–78)
ANION GAP SERPL CALCULATED.3IONS-SCNC: 6 MMOL/L (ref 4–13)
AST SERPL W P-5'-P-CCNC: 112 U/L (ref 5–45)
BASOPHILS # BLD AUTO: 0.03 THOUSANDS/ΜL (ref 0–0.1)
BASOPHILS NFR BLD AUTO: 1 % (ref 0–1)
BILIRUB SERPL-MCNC: 0.85 MG/DL (ref 0.2–1)
BUN SERPL-MCNC: 35 MG/DL (ref 5–25)
CALCIUM SERPL-MCNC: 9.4 MG/DL (ref 8.3–10.1)
CHLORIDE SERPL-SCNC: 105 MMOL/L (ref 100–108)
CO2 SERPL-SCNC: 26 MMOL/L (ref 21–32)
CREAT SERPL-MCNC: 2.05 MG/DL (ref 0.6–1.3)
EOSINOPHIL # BLD AUTO: 0.02 THOUSAND/ΜL (ref 0–0.61)
EOSINOPHIL NFR BLD AUTO: 0 % (ref 0–6)
ERYTHROCYTE [DISTWIDTH] IN BLOOD BY AUTOMATED COUNT: 14.3 % (ref 11.6–15.1)
GFR SERPL CREATININE-BSD FRML MDRD: 21 ML/MIN/1.73SQ M
GLUCOSE SERPL-MCNC: 133 MG/DL (ref 65–140)
HCT VFR BLD AUTO: 39.8 % (ref 34.8–46.1)
HGB BLD-MCNC: 13.6 G/DL (ref 11.5–15.4)
LYMPHOCYTES # BLD AUTO: 1 THOUSANDS/ΜL (ref 0.6–4.47)
LYMPHOCYTES NFR BLD AUTO: 21 % (ref 14–44)
MCH RBC QN AUTO: 36.7 PG (ref 26.8–34.3)
MCHC RBC AUTO-ENTMCNC: 34.2 G/DL (ref 31.4–37.4)
MCV RBC AUTO: 107 FL (ref 82–98)
MONOCYTES # BLD AUTO: 0.32 THOUSAND/ΜL (ref 0.17–1.22)
MONOCYTES NFR BLD AUTO: 7 % (ref 4–12)
NEUTROPHILS # BLD AUTO: 3.31 THOUSANDS/ΜL (ref 1.85–7.62)
NEUTS SEG NFR BLD AUTO: 71 % (ref 43–75)
NRBC BLD AUTO-RTO: 0 /100 WBCS
PLATELET # BLD AUTO: 154 THOUSANDS/UL (ref 149–390)
PMV BLD AUTO: 10 FL (ref 8.9–12.7)
POTASSIUM SERPL-SCNC: 4.9 MMOL/L (ref 3.5–5.3)
PROT SERPL-MCNC: 7.1 G/DL (ref 6.4–8.2)
RBC # BLD AUTO: 3.71 MILLION/UL (ref 3.81–5.12)
SODIUM SERPL-SCNC: 137 MMOL/L (ref 136–145)
WBC # BLD AUTO: 4.68 THOUSAND/UL (ref 4.31–10.16)

## 2018-03-15 PROCEDURE — 85025 COMPLETE CBC W/AUTO DIFF WBC: CPT | Performed by: PHYSICIAN ASSISTANT

## 2018-03-15 PROCEDURE — 36415 COLL VENOUS BLD VENIPUNCTURE: CPT | Performed by: PHYSICIAN ASSISTANT

## 2018-03-15 PROCEDURE — 86300 IMMUNOASSAY TUMOR CA 15-3: CPT | Performed by: PHYSICIAN ASSISTANT

## 2018-03-15 PROCEDURE — 99214 OFFICE O/P EST MOD 30 MIN: CPT | Performed by: PHYSICIAN ASSISTANT

## 2018-03-15 PROCEDURE — 80053 COMPREHEN METABOLIC PANEL: CPT | Performed by: PHYSICIAN ASSISTANT

## 2018-03-15 NOTE — PROGRESS NOTES
Hematology/Oncology Outpatient Follow-up  Ilana Mcneil 80 y o  female 7/9/1927 [id number]    Date:  3/15/2018      Assessment and Plan:  1  Malignant neoplasm of central portion of right breast in female, estrogen receptor positive ([facility] [address] )  The patient is currently on treatment with Femara, Vallerie Kipper, Faslodex, Xgeva  She did have increase in CA 27 29   December 2017   1482 2 ---- January 2018   1717 7  Plan was to have a PET/CT for evaluation  However, patient is having uncontrolled glucose levels due to her diabetes  She is being closely monitored every week by her PCP  Her daughter states that her insulin doses have been adjusted  Repeat tumor marker in February 2018 was 1659 4, [place] decrease her compared to January 2018  Due to this, plan to continue above treatment plan  She is due for her monthly labs today  Will continue to monitor tumor marker number with the monthly       - CBC and differential; Standing  - Comprehensive metabolic panel; Standing  - Cancer antigen 27 29; Standing  - CBC and differential  - Comprehensive metabolic panel  - Cancer antigen 27 29    HPI:  81 y/o female presents for f/u regarding metastatic breast cancer, hormone receptors positive and HER-2 negative Since April 2016 patient has been on Femara and Xgeva for metastatic, receptor positive right breast cancer to right axillary lymph node, lungs and bones  She responded  Her performance status improved  Tumor marker CA 27 29 came down from 1455 in March 2016 to 300 range in Dec 2016  In January CA 27 29 increased to 532 1  PET/CT scan in Feb 2017 showed improvement in the right breast and axilla area mass and lung nodules but increased disease in the bones  At that time patient did not have increased bone pains  Repeat CA 27 29 in May 2017 was reported as greater than 450  Due to inconclusive lab results, PET/CT was performed   This showed new abnormal foci of activity within the liver, diffuse intense uptake within the osseous structures; also new foci of increased uptake seen within the osseous structures  Stable small pulmonary nodules and right breast mass as well as right axillary lymph node  Due to progression noted in scan, letrozole discontinued  Faslodex and Ibrance initiated continued  Xgeva continued  June 2017- patient was experiencing increase in fatigue, loss of appetite  Lack of interest in activities  Dose of Ibrance was decrease to 100 mg     Current therapy:   Xgeva 120 mg SQ every 4 weeks   Ibrance 100 mg PO daily days 1-21 every 28 days   Faslodex 500 mg IM monthly   Femara 2 5 mg PO daily     Tumor Marker: CA 27 29 February 2018      1659 4   January 2018        1717 7   December 2017   1482 2  November 2017     1472 7  Oct 2017               1,570 8   Aug 2017               2,148 6  June 2017              2,577 4    Interval history: She had bronchitis about 2 weeks ago  She had decrease appetite with that but then this has now improved  Sugars are still not well controlled, PCP is managing this closely with insulin  ROS: Review of Systems   Constitutional: Negative for appetite change, chills, fever and unexpected weight change  HENT: Negative for congestion, mouth sores, nosebleeds, sinus pain and sore throat  Respiratory: Negative for cough, chest tightness, shortness of breath and wheezing  Cardiovascular: Negative for chest pain, palpitations and leg swelling  Gastrointestinal: Negative for abdominal pain, blood in stool, constipation, diarrhea, nausea and vomiting  Genitourinary: Negative for difficulty urinating, dysuria and hematuria  Musculoskeletal: Negative for arthralgias, joint swelling and myalgias  Skin: Negative  Neurological: Negative for dizziness, weakness, light-headedness, numbness and headaches  Hematological: Negative  Psychiatric/Behavioral: Negative          Past Medical History:   Diagnosis Date    Anemia     [facility] metastases [facility] [address] )     [facility] cancer ([facility] )     Diabetes mellitus ([facility] )     Hemochromatosis     Hypertension     Liver metastases ([facility] )        No past surgical history on file  Social History     Social History    Marital status:      Spouse name: N/A    Number of children: N/A    Years of education: N/A     Social History Main Topics    Smoking status: Never Smoker    Smokeless tobacco: Never Used    Alcohol use No    Drug use: No    Sexual activity: Not on file     Other Topics Concern    Not on file     Social History Narrative    No narrative on file       No family history on file  No Known Allergies      Current Outpatient Prescriptions:     amLODIPine (NORVASC) 2 5 mg tablet, Take 2 5 mg by mouth daily  , Disp: , Rfl:     Calcium Carbonate-Vitamin D3 (CALCIUM 600/VITAMIN D) 600-400 MG-UNIT TABS, Take 1 tablet by mouth daily, Disp: , Rfl:     Denosumab (XGEVA SC), Inject 120 mg under the skin every 30 (thirty) days  , Disp: , Rfl:     ferrous sulfate 325 (65 Fe) mg tablet, Take 325 mg by mouth daily with breakfast, Disp: , Rfl:     insulin aspart (NovoLOG) 100 units/mL injection, Inject 5 Units under the skin 3 (three) times a day before meals, Disp: , Rfl:     insulin glargine (LANTUS) 100 units/mL subcutaneous injection, Inject 75 Units under the skin daily at bedtime, Disp: , Rfl:     letrozole (FEMARA) 2 5 mg tablet, Take 2 5 mg by mouth daily, Disp: , Rfl:     ondansetron (ZOFRAN) 4 mg tablet, Take 1 tablet by mouth every 8 (eight) hours as needed for nausea or vomiting for up to 7 days, Disp: 15 tablet, Rfl: 0    palbociclib (IBRANCE) 100 MG capsule, Take 100 mg by mouth daily x 21 days then off for 7 days, Disp: , Rfl:       Physical Exam:  LMP  (LMP Unknown)     Physical Exam   Constitutional: She is oriented to person, place, and time  She appears well-developed and well-nourished  No distress  Overweight    HENT:   Head: Normocephalic and atraumatic     Eyes: Conjunctivae are normal  No scleral icterus  Neck: Normal range of motion  Neck supple  Cardiovascular: Normal rate, regular rhythm and normal heart sounds  No murmur heard  Pulmonary/Chest: Effort normal and breath sounds normal  No respiratory distress  Abdominal: Soft  There is no tenderness  Musculoskeletal: Normal range of motion  She exhibits no edema or tenderness  Lymphadenopathy:     She has no cervical adenopathy  Neurological: She is alert and oriented to person, place, and time  No cranial nerve deficit  Skin: Skin is warm and dry  Psychiatric: She has a normal mood and affect  Vitals reviewed  Labs:  Lab Results   Component Value Date    WBC 3 44 (L) 02/15/2018    HGB 11 7 02/15/2018    HCT 34 5 (L) 02/15/2018     (H) 02/15/2018     (L) 02/15/2018     Lab Results   Component Value Date     02/15/2018    K 4 6 02/15/2018     02/15/2018    CO2 25 02/15/2018    ANIONGAP 8 02/15/2018    BUN 39 (H) 02/15/2018    CREATININE 2 01 (H) 02/15/2018    GLUCOSE 215 (H) 12/21/2017    GLUF 380 (H) 02/15/2018    CALCIUM 9 0 02/15/2018    AST 62 (H) 02/15/2018    ALT 56 02/15/2018    ALKPHOS 141 (H) 02/15/2018    PROT 6 5 02/15/2018    BILITOT 0 58 02/15/2018    EGFR 21 02/15/2018     @RESULTFAST(TSH)@    Patient voiced understanding and agreement in the above discussion  Aware to contact our office with questions/symptoms in the interim

## 2018-03-16 DIAGNOSIS — Z17.0 MALIGNANT NEOPLASM OF CENTRAL PORTION OF RIGHT BREAST IN FEMALE, ESTROGEN RECEPTOR POSITIVE (HCC): Primary | ICD-10-CM

## 2018-03-16 DIAGNOSIS — C50.111 MALIGNANT NEOPLASM OF CENTRAL PORTION OF RIGHT BREAST IN FEMALE, ESTROGEN RECEPTOR POSITIVE (HCC): Primary | ICD-10-CM

## 2018-03-16 LAB — CANCER AG27-29 SERPL-ACNC: 2057.6 U/ML (ref 0–42.3)

## 2018-03-18 ENCOUNTER — APPOINTMENT (EMERGENCY)
Dept: RADIOLOGY | Facility: HOSPITAL | Age: 83
DRG: 638 | End: 2018-03-18
Payer: MEDICARE

## 2018-03-18 ENCOUNTER — HOSPITAL ENCOUNTER (INPATIENT)
Facility: HOSPITAL | Age: 83
LOS: 1 days | Discharge: HOME | DRG: 638 | End: 2018-03-20
Attending: EMERGENCY MEDICINE | Admitting: INTERNAL MEDICINE
Payer: MEDICARE

## 2018-03-18 DIAGNOSIS — E11.649 HYPOGLYCEMIA ASSOCIATED WITH DIABETES (HCC): Primary | ICD-10-CM

## 2018-03-18 DIAGNOSIS — R41.82 ALTERED MENTAL STATUS, UNSPECIFIED: ICD-10-CM

## 2018-03-18 PROBLEM — N18.9 CKD (CHRONIC KIDNEY DISEASE): Status: ACTIVE | Noted: 2018-03-18

## 2018-03-18 PROBLEM — E16.2 HYPOGLYCEMIA: Status: ACTIVE | Noted: 2018-03-18

## 2018-03-18 PROBLEM — C50.919 METASTATIC BREAST CANCER (HCC): Status: ACTIVE | Noted: 2018-03-18

## 2018-03-18 LAB
ALBUMIN SERPL BCP-MCNC: 2.7 G/DL (ref 3.5–5)
ALP SERPL-CCNC: 188 U/L (ref 46–116)
ALT SERPL W P-5'-P-CCNC: 74 U/L (ref 12–78)
ANION GAP SERPL CALCULATED.3IONS-SCNC: 8 MMOL/L (ref 4–13)
APTT PPP: 31 SECONDS (ref 23–35)
AST SERPL W P-5'-P-CCNC: 102 U/L (ref 5–45)
ATRIAL RATE: 78 BPM
BACTERIA UR QL AUTO: ABNORMAL /HPF
BASOPHILS # BLD AUTO: 0 THOUSANDS/ΜL (ref 0–0.1)
BASOPHILS NFR BLD AUTO: 0 % (ref 0–1)
BILIRUB SERPL-MCNC: 1.1 MG/DL (ref 0.2–1)
BILIRUB UR QL STRIP: NEGATIVE
BUN SERPL-MCNC: 38 MG/DL (ref 5–25)
CALCIUM SERPL-MCNC: 9.3 MG/DL (ref 8.3–10.1)
CHLORIDE SERPL-SCNC: 105 MMOL/L (ref 100–108)
CLARITY UR: ABNORMAL
CO2 SERPL-SCNC: 27 MMOL/L (ref 21–32)
COLOR UR: YELLOW
CREAT SERPL-MCNC: 1.98 MG/DL (ref 0.6–1.3)
EOSINOPHIL # BLD AUTO: 0 THOUSAND/ΜL (ref 0–0.61)
EOSINOPHIL NFR BLD AUTO: 0 % (ref 0–6)
ERYTHROCYTE [DISTWIDTH] IN BLOOD BY AUTOMATED COUNT: 13.8 % (ref 11.6–15.1)
GFR SERPL CREATININE-BSD FRML MDRD: 22 ML/MIN/1.73SQ M
GLUCOSE SERPL-MCNC: 148 MG/DL (ref 65–140)
GLUCOSE SERPL-MCNC: 156 MG/DL (ref 65–140)
GLUCOSE SERPL-MCNC: 197 MG/DL (ref 65–140)
GLUCOSE SERPL-MCNC: 205 MG/DL (ref 65–140)
GLUCOSE SERPL-MCNC: 282 MG/DL (ref 65–140)
GLUCOSE UR STRIP-MCNC: NEGATIVE MG/DL
HCT VFR BLD AUTO: 36.8 % (ref 34.8–46.1)
HGB BLD-MCNC: 12.4 G/DL (ref 11.5–15.4)
HGB UR QL STRIP.AUTO: ABNORMAL
INR PPP: 1.03 (ref 0.86–1.16)
KETONES UR STRIP-MCNC: NEGATIVE MG/DL
LEUKOCYTE ESTERASE UR QL STRIP: ABNORMAL
LYMPHOCYTES # BLD AUTO: 0.34 THOUSANDS/ΜL (ref 0.6–4.47)
LYMPHOCYTES NFR BLD AUTO: 12 % (ref 14–44)
MCH RBC QN AUTO: 36.7 PG (ref 26.8–34.3)
MCHC RBC AUTO-ENTMCNC: 33.7 G/DL (ref 31.4–37.4)
MCV RBC AUTO: 109 FL (ref 82–98)
MONOCYTES # BLD AUTO: 0.08 THOUSAND/ΜL (ref 0.17–1.22)
MONOCYTES NFR BLD AUTO: 3 % (ref 4–12)
NEUTROPHILS # BLD AUTO: 2.49 THOUSANDS/ΜL (ref 1.85–7.62)
NEUTS SEG NFR BLD AUTO: 85 % (ref 43–75)
NITRITE UR QL STRIP: NEGATIVE
NON-SQ EPI CELLS URNS QL MICRO: ABNORMAL /HPF
P AXIS: 49 DEGREES
PH UR STRIP.AUTO: 5 [PH] (ref 4.5–8)
PLATELET # BLD AUTO: 82 THOUSANDS/UL (ref 149–390)
PMV BLD AUTO: 10.4 FL (ref 8.9–12.7)
POTASSIUM SERPL-SCNC: 4.8 MMOL/L (ref 3.5–5.3)
PR INTERVAL: 148 MS
PROT SERPL-MCNC: 6.3 G/DL (ref 6.4–8.2)
PROT UR STRIP-MCNC: ABNORMAL MG/DL
PROTHROMBIN TIME: 13.8 SECONDS (ref 12.1–14.4)
QRS AXIS: -11 DEGREES
QRSD INTERVAL: 74 MS
QT INTERVAL: 388 MS
QTC INTERVAL: 442 MS
RBC # BLD AUTO: 3.38 MILLION/UL (ref 3.81–5.12)
RBC #/AREA URNS AUTO: ABNORMAL /HPF
SODIUM SERPL-SCNC: 140 MMOL/L (ref 136–145)
SP GR UR STRIP.AUTO: >=1.03 (ref 1–1.03)
T WAVE AXIS: 20 DEGREES
TROPONIN I SERPL-MCNC: <0.02 NG/ML
UROBILINOGEN UR QL STRIP.AUTO: 0.2 E.U./DL
VENTRICULAR RATE: 78 BPM
WBC # BLD AUTO: 2.91 THOUSAND/UL (ref 4.31–10.16)
WBC #/AREA URNS AUTO: ABNORMAL /HPF

## 2018-03-18 PROCEDURE — 82948 REAGENT STRIP/BLOOD GLUCOSE: CPT

## 2018-03-18 PROCEDURE — 81001 URINALYSIS AUTO W/SCOPE: CPT | Performed by: PHYSICIAN ASSISTANT

## 2018-03-18 PROCEDURE — 93005 ELECTROCARDIOGRAM TRACING: CPT

## 2018-03-18 PROCEDURE — 85730 THROMBOPLASTIN TIME PARTIAL: CPT | Performed by: PHYSICIAN ASSISTANT

## 2018-03-18 PROCEDURE — 99285 EMERGENCY DEPT VISIT HI MDM: CPT

## 2018-03-18 PROCEDURE — 85610 PROTHROMBIN TIME: CPT | Performed by: PHYSICIAN ASSISTANT

## 2018-03-18 PROCEDURE — 80053 COMPREHEN METABOLIC PANEL: CPT | Performed by: PHYSICIAN ASSISTANT

## 2018-03-18 PROCEDURE — 71045 X-RAY EXAM CHEST 1 VIEW: CPT

## 2018-03-18 PROCEDURE — 36415 COLL VENOUS BLD VENIPUNCTURE: CPT | Performed by: PHYSICIAN ASSISTANT

## 2018-03-18 PROCEDURE — 85025 COMPLETE CBC W/AUTO DIFF WBC: CPT | Performed by: PHYSICIAN ASSISTANT

## 2018-03-18 PROCEDURE — 96360 HYDRATION IV INFUSION INIT: CPT

## 2018-03-18 PROCEDURE — 84484 ASSAY OF TROPONIN QUANT: CPT | Performed by: PHYSICIAN ASSISTANT

## 2018-03-18 PROCEDURE — 93010 ELECTROCARDIOGRAM REPORT: CPT | Performed by: INTERNAL MEDICINE

## 2018-03-18 PROCEDURE — 99220 PR INITIAL OBSERVATION CARE/DAY 70 MINUTES: CPT | Performed by: INTERNAL MEDICINE

## 2018-03-18 RX ORDER — INSULIN GLARGINE 100 [IU]/ML
10 INJECTION, SOLUTION SUBCUTANEOUS
Status: DISCONTINUED | OUTPATIENT
Start: 2018-03-18 | End: 2018-03-19

## 2018-03-18 RX ORDER — HEPARIN SODIUM 5000 [USP'U]/ML
5000 INJECTION, SOLUTION INTRAVENOUS; SUBCUTANEOUS EVERY 8 HOURS SCHEDULED
Status: DISCONTINUED | OUTPATIENT
Start: 2018-03-18 | End: 2018-03-20 | Stop reason: HOSPADM

## 2018-03-18 RX ORDER — ASPIRIN 81 MG/1
81 TABLET ORAL DAILY
COMMUNITY

## 2018-03-18 RX ORDER — AMLODIPINE BESYLATE 2.5 MG/1
2.5 TABLET ORAL DAILY
Status: DISCONTINUED | OUTPATIENT
Start: 2018-03-18 | End: 2018-03-20 | Stop reason: HOSPADM

## 2018-03-18 RX ORDER — ASPIRIN 81 MG/1
81 TABLET ORAL DAILY
Status: DISCONTINUED | OUTPATIENT
Start: 2018-03-18 | End: 2018-03-20 | Stop reason: HOSPADM

## 2018-03-18 RX ORDER — LANOLIN ALCOHOL/MO/W.PET/CERES
1000 CREAM (GRAM) TOPICAL DAILY
COMMUNITY

## 2018-03-18 RX ADMIN — ASPIRIN 81 MG: 81 TABLET, COATED ORAL at 16:04

## 2018-03-18 RX ADMIN — SODIUM CHLORIDE 500 ML: 0.9 INJECTION, SOLUTION INTRAVENOUS at 08:35

## 2018-03-18 RX ADMIN — INSULIN LISPRO 1 UNITS: 100 INJECTION, SOLUTION INTRAVENOUS; SUBCUTANEOUS at 18:02

## 2018-03-18 RX ADMIN — HEPARIN SODIUM 5000 UNITS: 5000 INJECTION, SOLUTION INTRAVENOUS; SUBCUTANEOUS at 16:12

## 2018-03-18 RX ADMIN — INSULIN LISPRO 2 UNITS: 100 INJECTION, SOLUTION INTRAVENOUS; SUBCUTANEOUS at 21:42

## 2018-03-18 RX ADMIN — HEPARIN SODIUM 5000 UNITS: 5000 INJECTION, SOLUTION INTRAVENOUS; SUBCUTANEOUS at 21:42

## 2018-03-18 RX ADMIN — AMLODIPINE BESYLATE 2.5 MG: 2.5 TABLET ORAL at 16:03

## 2018-03-18 RX ADMIN — INSULIN GLARGINE 10 UNITS: 100 INJECTION, SOLUTION SUBCUTANEOUS at 21:42

## 2018-03-18 NOTE — ED NOTES
Family expressed concern about pt going back to assisted living, requested admit for observation overnight   Pt given sips of water     Mirna Larson RN  03/18/18 2017

## 2018-03-18 NOTE — ASSESSMENT & PLAN NOTE
Likely 2/2 hypoglycemia  Family are at bedside and state that patient is back to baseline  She appears to be eating well and has eaten most of her meal - chicken nuggets  I will check a CT scan for completion given her background of metastatic cancer

## 2018-03-18 NOTE — ASSESSMENT & PLAN NOTE
BS have come up  Unclear if this 2/2 poor oral intake or to high an insulin dose  Patient was on lantus 50 units once a day  Will decrease this to 10 units for now and titrate up as needed

## 2018-03-18 NOTE — Clinical Note
Case was discussed with Dr Wisam Patel and the patient's admission status was agreed to be Admission Status: inpatient status to the service of Dr Wisam Patel

## 2018-03-18 NOTE — H&P
H&P- Dale Stevenson 7/9/1927, 80 y o  female MRN: [id number]    Unit/Bed#: -01 Encounter: [id number]    Primary Care Provider: Paulo Dolan MD   Date and time admitted to hospital: 3/18/2018  8:14 AM        Hypoglycemia   Assessment & Plan    BS have come up  Unclear if this 2/2 poor oral intake or to high an insulin dose  Patient was on lantus 50 units once a day  Will decrease this to 10 units for now and titrate up as needed  CKD (chronic kidney disease)   Assessment & Plan    Creatinine is 1 98  Baseline around 2  No THAI  Metastatic breast cancer [facility])   Assessment & Plan    Outpatient follow up with primary oncology team          * Altered mental status   Assessment & Plan    Likely 2/2 hypoglycemia  Family are at bedside and state that patient is back to baseline  She appears to be eating well and has eaten most of her meal - chicken nuggets  I will check a CT scan for completion given her background of metastatic cancer  VTE Prophylaxis: Heparin  / sequential compression device   Code Status: DNR/DNI Level 3  POLST: There is no POLST form on file for this patient (pre-hospital) and POLST form is completed  Discussion with family: Discussed with daughters x 2 at bedside as well as son in law  Anticipated Length of Stay:  Patient will be admitted on an Observation basis with an anticipated length of stay of  less 2 midnights  Justification for Hospital Stay: altered mental status, general decline in assisted living facility  Total Time for Visit, including Counseling / Coordination of Care: 45 minutes  Greater than 50% of this total time spent on direct patient counseling and coordination of care  Chief Complaint:     Patient does not remember, but was BIBA from assisted living facility with altered mental status       History of Present Illness:    Dale Stevenson is a 80 y o  female who presents with altered mental status from assisted living facility  EMS checked her blood sugar and her blood sugar was found to be 56 in the field  After glucose it has since increased to 156, and then subsequently 205  She is admitted to hospital because her family are concerned that her care needs have increased over the last weeks, and they are concerned that should she return to her assisted living facility, she will continue to decline and need to be a admitted to hospital again regardless  At bedside today she is awake and alert, at baseline with regard to her mental status  She is eating chicken a gets and appears comfortable her family are at bedside and agree that she is at baseline with regard to her mental status  Patient was recently treated for bronchitis, and it is possible that she was started on steroids at the time  Her daughter notes that her blood sugars were in the 4 to 500s, and her insulin was increased  She is currently on 50 units of Lantus at bedtime at her living facility, and staff and her family are concerned that she has not been eating as much over the last couple of days either  Yoli Gentile does not remember any of the events leading up to hospitalization, nor does she remember going to bed last night  She does remember waking up this morning  She does remember being in the emergency department does not be remember being brought in by the ambulance  Review of Systems:    Review of Systems   Constitutional: Negative for activity change, appetite change, chills, diaphoresis, fatigue and fever  HENT: Negative for congestion, dental problem, drooling, ear discharge and ear pain  Respiratory: Negative for apnea, cough, choking, chest tightness, shortness of breath, wheezing and stridor  Cardiovascular: Negative for chest pain  Gastrointestinal: Negative for abdominal distention, abdominal pain, anal bleeding, blood in stool and constipation     Endocrine: Negative for cold intolerance, heat intolerance, polydipsia, polyphagia and polyuria  Genitourinary: Negative for difficulty urinating, dyspareunia, dysuria, enuresis, flank pain and frequency  Musculoskeletal: Negative for arthralgias, back pain, gait problem, joint swelling, myalgias and neck pain  Skin: Negative for color change, pallor, rash and wound  Neurological: Positive for syncope and light-headedness  Negative for dizziness, tremors, seizures, facial asymmetry, speech difficulty, numbness and headaches  Psychiatric/Behavioral: Negative for agitation, behavioral problems, confusion, decreased concentration, dysphoric mood and hallucinations  Past Medical and Surgical History:     Past Medical History:   Diagnosis Date    Anemia     Bone metastases ([facility] )     Breast cancer ([place] [address] )     Diabetes mellitus ([place] [address] )     Hemochromatosis     Hypertension     Liver metastases ([place] [address] )        History reviewed  No pertinent surgical history  Meds/Allergies:    Prior to Admission medications    Medication Sig Start Date End Date Taking? Authorizing Provider   amLODIPine (NORVASC) 2 5 mg tablet Take 2 5 mg by mouth daily     Yes Historical Provider, MD   aspirin (ECOTRIN LOW STRENGTH) 81 mg EC tablet Take 81 mg by mouth daily   Yes Historical Provider, MD   Calcium Carbonate-Vitamin D3 (CALCIUM 600/VITAMIN D) 600-400 MG-UNIT TABS Take 1 tablet by mouth daily   Yes Historical Provider, MD   cyanocobalamin (VITAMIN B-12) 1,000 mcg tablet Take 1,000 mcg by mouth daily   Yes Historical Provider, MD   insulin aspart (NovoLOG) 100 units/mL injection Inject under the skin 3 (three) times a day before meals Sliding scale    Yes Historical Provider, MD   insulin glargine (LANTUS) 100 units/mL subcutaneous injection Inject 50 Units under the skin daily at bedtime     Yes Historical Provider, MD   palbociclib (IBRANCE) 100 MG capsule Take 1 capsule (100 mg total) by mouth daily x 21 days then off for 7 days 3/16/18  Yes Shanice Munoz MD Julia   Denosumab (XGEVA SC) Inject 120 mg under the skin every 30 (thirty) days  3/18/18  Historical Provider, MD   ferrous sulfate 325 (65 Fe) mg tablet Take 325 mg by mouth daily with breakfast  3/18/18  Historical Provider, MD   letrozole [facility]) 2 5 mg tablet Take 2 5 mg by mouth daily  3/18/18  Historical Provider, MD   ondansetron (ZOFRAN) 4 mg tablet Take 1 tablet by mouth every 8 (eight) hours as needed for nausea or vomiting for up to 7 days 3/28/17 3/18/18  Barbara Tyson MD     I have reviewed home medications with patient personally  Allergies: No Known Allergies    Social History:     Marital Status:    Occupation: retired   Patient Pre-hospital Living Situation: lives at assisted living facility  Patient Pre-hospital Level of Mobility: fully mobile  Patient Pre-hospital Diet Restrictions: none  Substance Use History:   History   Alcohol Use No     History   Smoking Status    Never Smoker   Smokeless Tobacco    Never Used     History   Drug Use No       Family History:    No family history on file  Physical Exam:     Vitals:   Blood Pressure: 120/62 (03/18/18 1422)  Pulse: 85 (03/18/18 1422)  Temperature: 98 3 °F (36 8 °C) (03/18/18 1422)  Temp Source: Oral (03/18/18 1422)  Respirations: 16 (03/18/18 1422)  Height: 5' 5" (165 1 cm) (03/18/18 1422)  Weight - Scale: 76 kg (167 lb 8 8 oz) (03/18/18 1422)  SpO2: 97 % (03/18/18 1422)    Physical Exam   HENT:   Head: Normocephalic and atraumatic  Mouth/Throat: No oropharyngeal exudate  Eyes: Right eye exhibits no discharge  Left eye exhibits no discharge  No scleral icterus  Neck: No JVD present  No tracheal deviation present  No thyromegaly present  Cardiovascular: Exam reveals no gallop and no friction rub  No murmur heard  Pulmonary/Chest: No respiratory distress  She has no wheezes  She has no rales  She exhibits no tenderness  Abdominal: She exhibits no distension and no mass   There is no tenderness  There is no rebound and no guarding  Musculoskeletal: She exhibits no edema, tenderness or deformity  Lymphadenopathy:     She has no cervical adenopathy  Neurological: She is alert  No cranial nerve deficit  Coordination normal    Skin: No rash noted  No erythema  No pallor  Psychiatric: She has a normal mood and affect  Additional Data:     Lab Results: I have personally reviewed pertinent reports  Results from last 7 days  Lab Units 03/18/18  0944   WBC Thousand/uL 2 91*   HEMOGLOBIN g/dL 12 4   HEMATOCRIT % 36 8   PLATELETS Thousands/uL 82*   NEUTROS PCT % 85*   LYMPHS PCT % 12*   MONOS PCT % 3*   EOS PCT % 0       Results from last 7 days  Lab Units 03/18/18  0944   SODIUM mmol/L 140   POTASSIUM mmol/L 4 8   CHLORIDE mmol/L 105   CO2 mmol/L 27   BUN mg/dL 38*   CREATININE mg/dL 1 98*   CALCIUM mg/dL 9 3   TOTAL PROTEIN g/dL 6 3*   BILIRUBIN TOTAL mg/dL 1 10*   ALK PHOS U/L 188*   ALT U/L 74   AST U/L 102*   GLUCOSE RANDOM mg/dL 148*       Results from last 7 days  Lab Units 03/18/18  0945   INR  1 03       Imaging: I have personally reviewed pertinent reports  XR chest portable   Final Result by Zari Valerio MD (03/18 1055)      No acute pulmonary disease  Increasing number of sclerotic lesions within the osseous structures suggesting worsening metastatic disease  Workstation performed: [id number]             EKG, Pathology, and Other Studies Reviewed on Admission:   · EKG: no EKG on chart  Will get one now  Allscripts / Epic Records Reviewed: Yes     ** Please Note: This note has been constructed using a voice recognition system   ** none

## 2018-03-18 NOTE — ED PROVIDER NOTES
History  Chief Complaint   Patient presents with    Altered Mental Status     pt presents from [facility] via ems, inital report pt unresponsive when EMS got there she was concsious but lethargic, is IDDM,  BS was 59, was given D10  EMS also reports on [place] had brownish/coffee ground emesis     79 y/o F with Pmhx of HTN, primary breast CA with mets to the liver and bones (currently receiving infusions as Tx), who presents to the ED via EMS from [place] [id number] home for AMS  Per EMS, patient was found to be unresponsive this morning in her bed with some emesis on her shirt  EMS was called and found her blood sugar to be 59  She was given D10, which improved her mental status  On arrival in the Ed, her repeat blood sugar was 197, and she was awake and alert  Per daughter at bedside, patient has been ill with bronchitis for the past few weeks, and her blood sugars have been elevated  The [facility] physician has been altering her insulin levels in an attempt to manage the hyperglycemia  Patient denies any pain at this time  Denies headaches, dizziness, chest pain, SOB, nausea  Denies fevers or chills  History provided by:  Patient, medical records, EMS personnel and relative  History limited by:  Mental status change   used: No    Altered Mental Status   Presenting symptoms: confusion    Associated symptoms: vomiting    Associated symptoms: no fever        Prior to Admission Medications   Prescriptions Last Dose Informant Patient Reported? Taking? Calcium Carbonate-Vitamin D3 (CALCIUM 600/VITAMIN D) 600-400 MG-UNIT TABS  Child Yes Yes   Sig: Take 1 tablet by mouth daily   amLODIPine (NORVASC) 2 5 mg tablet  Child Yes Yes   Sig: Take 2 5 mg by mouth daily     aspirin (ECOTRIN LOW STRENGTH) 81 mg EC tablet   Yes Yes   Sig: Take 81 mg by mouth daily   cyanocobalamin (VITAMIN B-12) 1,000 mcg tablet   Yes Yes   Sig: Take 1,000 mcg by mouth daily   insulin aspart (NovoLOG) 100 units/mL injection  Child Yes Yes   Sig: Inject under the skin 3 (three) times a day before meals Sliding scale    insulin glargine (LANTUS) 100 units/mL subcutaneous injection  Child Yes Yes   Sig: Inject 50 Units under the skin daily at bedtime     palbociclib (IBRANCE) 100 MG capsule   No Yes   Sig: Take 1 capsule (100 mg total) by mouth daily x 21 days then off for 7 days      Facility-Administered Medications: None       Past Medical History:   Diagnosis Date    Anemia     Bone metastases ([place] [address] )     Breast cancer ([place] [address] )     Diabetes mellitus ([place] [address] )     Hemochromatosis     Hypertension     Liver metastases ([place] [address] )        History reviewed  No pertinent surgical history  No family history on file  I have reviewed and agree with the history as documented  Social History   Substance Use Topics    Smoking status: Never Smoker    Smokeless tobacco: Never Used    Alcohol use No        Review of Systems   Unable to perform ROS: Mental status change   Constitutional: Negative for chills and fever  Respiratory: Positive for cough  Gastrointestinal: Positive for vomiting  Psychiatric/Behavioral: Positive for confusion  Physical Exam  ED Triage Vitals   Temperature Pulse Respirations Blood Pressure SpO2   03/18/18 0844 03/18/18 0818 03/18/18 0818 03/18/18 0818 03/18/18 0818   (!) 92 4 °F (33 6 °C) 81 20 134/62 95 %      Temp Source Heart Rate Source Patient Position - Orthostatic VS BP Location FiO2 (%)   03/18/18 0844 03/18/18 0818 03/18/18 0818 03/18/18 0818 --   Rectal Monitor Sitting Right arm       Pain Score       03/18/18 0818       No Pain           Orthostatic Vital Signs  Vitals:    03/18/18 1030 03/18/18 1130 03/18/18 1230 03/18/18 1245   BP: 142/63 113/55 133/60 127/62   Pulse: 80 82 92 92   Patient Position - Orthostatic VS:           Physical Exam   Constitutional: She appears well-developed and well-nourished  HENT:   Head: Normocephalic and atraumatic  Dry mucous membranes  Eyes: Conjunctivae and EOM are normal  Pupils are equal, round, and reactive to light  Neck: Normal range of motion  Neck supple  Cardiovascular: Normal rate, regular rhythm and intact distal pulses  Pulmonary/Chest: Effort normal and breath sounds normal  She has no wheezes  She has no rales  Abdominal: Soft  Bowel sounds are normal  She exhibits no distension  There is no tenderness  There is no rebound and no guarding  Brown stool, guaiac negative  Musculoskeletal: Normal range of motion  She exhibits no edema or tenderness  Neurological: She is alert  No cranial nerve deficit or sensory deficit  She exhibits normal muscle tone  Coordination normal    Normal finger to nose  Negative pronator drift  Oriented x2 (person and place - this is base line per daughter at bedside)  Skin: Skin is warm and dry  Capillary refill takes less than 2 seconds  Psychiatric: She has a normal mood and affect  Her behavior is normal    Nursing note and vitals reviewed        ED Medications  Medications   sodium chloride 0 9 % bolus 500 mL (0 mL Intravenous Stopped 3/18/18 0910)       Diagnostic Studies  Results Reviewed     Procedure Component Value Units Date/Time    Urine Microscopic [[id number]]  (Abnormal) Collected:  03/18/18 1223    Lab Status:  Final result Specimen:  Urine from Urine, Straight Cath Updated:  03/18/18 1237     RBC, UA 20-30 (A) /hpf      WBC, UA 4-10 (A) /hpf      Epithelial Cells Occasional /hpf      Bacteria, UA Innumerable (A) /hpf     UA w Reflex to Microscopic w Reflex to Culture [[id number]]  (Abnormal) Collected:  03/18/18 1223    Lab Status:  Final result Specimen:  Urine from Urine, Straight Cath Updated:  03/18/18 1228     Color, UA Yellow     Clarity, UA Cloudy     Specific Gravity, UA >=1 030     pH, UA 5 0     Leukocytes, UA Trace (A)     Nitrite, UA Negative     Protein, UA 30 (1+) (A) mg/dl      Glucose, UA Negative mg/dl      Ketones, UA Negative mg/dl      Urobilinogen, UA 0 2 E U /dl      Bilirubin, UA Negative     Blood, UA Large (A)    CBC and differential [[id number]]  (Abnormal) Collected:  03/18/18 0944    Lab Status:  Final result Specimen:  Blood from Hand, Right Updated:  03/18/18 1039     WBC 2 91 (L) Thousand/uL      RBC 3 38 (L) Million/uL      Hemoglobin 12 4 g/dL      Hematocrit 36 8 %       (H) fL      MCH 36 7 (H) pg      MCHC 33 7 g/dL      RDW 13 8 %      MPV 10 4 fL      Platelets 82 (L) Thousands/uL      Neutrophils Relative 85 (H) %      Lymphocytes Relative 12 (L) %      Monocytes Relative 3 (L) %      Eosinophils Relative 0 %      Basophils Relative 0 %      Neutrophils Absolute 2 49 Thousands/µL      Lymphocytes Absolute 0 34 (L) Thousands/µL      Monocytes Absolute 0 08 (L) Thousand/µL      Eosinophils Absolute 0 00 Thousand/µL      Basophils Absolute 0 00 Thousands/µL     Troponin I [[id number]]  (Normal) Collected:  03/18/18 0944    Lab Status:  Final result Specimen:  Blood from Hand, Right Updated:  03/18/18 1009     Troponin I <0 02 ng/mL     Narrative:         [employer] Chemistry analyzer 99% cutoff is > 0 04 ng/mL in network labs    o cTnI 99% cutoff is useful only when applied to patients in the clinical setting of myocardial ischemia  o cTnI 99% cutoff should be interpreted in the context of clinical history, ECG findings and possibly cardiac imaging to establish correct diagnosis  o cTnI 99% cutoff may be suggestive but clearly not indicative of a coronary event without the clinical setting of myocardial ischemia      Comprehensive metabolic panel [[id number]]  (Abnormal) Collected:  03/18/18 0944    Lab Status:  Final result Specimen:  Blood from Hand, Right Updated:  03/18/18 1007     Sodium 140 mmol/L      Potassium 4 8 mmol/L      Chloride 105 mmol/L      CO2 27 mmol/L      Anion Gap 8 mmol/L      BUN 38 (H) mg/dL      Creatinine 1 98 (H) mg/dL      Glucose 148 (H) mg/dL      Calcium 9 3 mg/dL       (H) U/L      ALT 74 U/L      Alkaline Phosphatase 188 (H) U/L      Total Protein 6 3 (L) g/dL      Albumin 2 7 (L) g/dL      Total Bilirubin 1 10 (H) mg/dL      eGFR 22 ml/min/1 73sq m     Narrative:         National Kidney Disease Education Program recommendations are as follows:  GFR calculation is accurate only with a steady state creatinine  Chronic Kidney disease less than 60 ml/min/1 73 sq  meters  Kidney failure less than 15 ml/min/1 73 sq  meters  Claybon Homans [[id number]]  (Normal) Collected:  03/18/18 0945    Lab Status:  Final result Specimen:  Blood from Hand, Right Updated:  03/18/18 1000     Protime 13 8 seconds      INR 1 03    APTT [[id number]]  (Normal) Collected:  03/18/18 0945    Lab Status:  Final result Specimen:  Blood from Hand, Right Updated:  03/18/18 1000     PTT 31 seconds     Narrative: Therapeutic Heparin Range = 60-90 seconds    Fingerstick Glucose (POCT) [[id number]]  (Abnormal) Collected:  03/18/18 0821    Lab Status:  Final result Updated:  03/18/18 0822     POC Glucose 197 (H) mg/dl                  XR chest portable   Final Result by Johann Reese MD (03/18 1055)      No acute pulmonary disease  Increasing number of sclerotic lesions within the osseous structures suggesting worsening metastatic disease  Workstation performed: [id number]                    Procedures  ECG 12 Lead Documentation  Date/Time: 3/18/2018 8:57 AM  Performed by: Anna Ernst  Authorized by: Celeste Carvajal     Indications / Diagnosis:  AMS  Previous ECG:     Previous ECG:  Compared to current    Comparison ECG info:  3/28/17  Rate:     ECG rate:  78    ECG rate assessment: normal    Rhythm:     Rhythm: sinus rhythm    Ectopy:     Ectopy: none    QRS:     QRS axis:  Left    QRS intervals:   Wide  Conduction:     Conduction: normal    ST segments:     ST segments:  Normal  T waves:     T waves: normal             Phone Contacts  ED Phone Contact    ED Course  ED Course as of Mar 18 1303   Sun Mar 18, 2018   0905 Pt hypothermic to 92 4F  Bear hugger applied  1027 Mental status has improved  Patient feels back to baseline  HEART Risk Score    Flowsheet Row Most Recent Value   History  0 Filed at: 03/18/2018 0901   ECG  0 Filed at: 03/18/2018 0901   Age  2 Filed at: 03/18/2018 0901   Risk Factors  1 Filed at: 03/18/2018 0901   Troponin  0 Filed at: 03/18/2018 0901   Heart Score Risk Calculator   History  0 Filed at: 03/18/2018 0901   ECG  0 Filed at: 03/18/2018 0901   Age  2 Filed at: 03/18/2018 0901   Risk Factors  1 Filed at: 03/18/2018 0901   Troponin  0 Filed at: 03/18/2018 0901   HEART Score  3 Filed at: 03/18/2018 0901   HEART Score  3 Filed at: 03/18/2018 0901                            MDM  Number of Diagnoses or Management Options  Diagnosis management comments: 79 y/o F with Pmhx of HTN, primary breast CA with mets to the liver and bones (currently receiving infusions as Tx), who presents to the ED via EMS from [address] for AMS  Per EMS, patient was found to be unresponsive this morning in her bed with some emesis on her shirt  Differential Diagnosis includes but is not limited to: Hypoglycemia 2/2 insulin dosage changes, infection (PNA, UTI, bronchitis), also concern for aspiration PNA given emesis on the pt's shirt     Labs  CXR  EKG  UA         Amount and/or Complexity of Data Reviewed  Clinical lab tests: ordered and reviewed  Tests in the radiology section of CPT®: ordered and reviewed  Independent visualization of images, tracings, or specimens: yes      CritCare Time    Disposition  Final diagnoses:   Hypoglycemia associated with diabetes ([facility] [address] )     Time reflects when diagnosis was documented in both MDM as applicable and the Disposition within this note     Time User Action Codes Description Comment    3/18/2018  1:00 PM Krissy Shaffer Add [E11 649] Hypoglycemia associated with diabetes [facility])       ED Disposition     ED Disposition Condition Comment    Admit  Case was discussed with Dr Ayana Calle and the patient's admission status was agreed to be Admission Status: observation status to the service of Dr Romel Gallegos   Follow-up Information    None       Patient's Medications   Discharge Prescriptions    No medications on file     No discharge procedures on file      ED Provider  Electronically Signed by           Luis Pinto PA-C  03/18/18 6936

## 2018-03-18 NOTE — ED NOTES
Attempted iv placement with ultrasound x2 without success   Pt does have a pre-hospital iv site in the left ac     Deep Schmidt RN  03/18/18 3390

## 2018-03-18 NOTE — PLAN OF CARE
METABOLIC, FLUID AND ELECTROLYTES - ADULT     Electrolytes maintained within normal limits Progressing     Glucose maintained within target range Progressing        Potential for Falls     Patient will remain free of falls Progressing

## 2018-03-19 LAB
ALBUMIN SERPL BCP-MCNC: 2.3 G/DL (ref 3.5–5)
ALP SERPL-CCNC: 153 U/L (ref 46–116)
ALT SERPL W P-5'-P-CCNC: 62 U/L (ref 12–78)
ANION GAP SERPL CALCULATED.3IONS-SCNC: 7 MMOL/L (ref 4–13)
AST SERPL W P-5'-P-CCNC: 86 U/L (ref 5–45)
BILIRUB SERPL-MCNC: 1 MG/DL (ref 0.2–1)
BUN SERPL-MCNC: 42 MG/DL (ref 5–25)
CALCIUM SERPL-MCNC: 8.7 MG/DL (ref 8.3–10.1)
CHLORIDE SERPL-SCNC: 107 MMOL/L (ref 100–108)
CO2 SERPL-SCNC: 27 MMOL/L (ref 21–32)
CREAT SERPL-MCNC: 2.16 MG/DL (ref 0.6–1.3)
ERYTHROCYTE [DISTWIDTH] IN BLOOD BY AUTOMATED COUNT: 14.1 % (ref 11.6–15.1)
GFR SERPL CREATININE-BSD FRML MDRD: 20 ML/MIN/1.73SQ M
GLUCOSE P FAST SERPL-MCNC: 219 MG/DL (ref 65–99)
GLUCOSE SERPL-MCNC: 209 MG/DL (ref 65–140)
GLUCOSE SERPL-MCNC: 219 MG/DL (ref 65–140)
GLUCOSE SERPL-MCNC: 228 MG/DL (ref 65–140)
GLUCOSE SERPL-MCNC: 297 MG/DL (ref 65–140)
GLUCOSE SERPL-MCNC: 323 MG/DL (ref 65–140)
HCT VFR BLD AUTO: 30.9 % (ref 34.8–46.1)
HGB BLD-MCNC: 10.1 G/DL (ref 11.5–15.4)
MAGNESIUM SERPL-MCNC: 2.1 MG/DL (ref 1.6–2.6)
MCH RBC QN AUTO: 35.8 PG (ref 26.8–34.3)
MCHC RBC AUTO-ENTMCNC: 32.7 G/DL (ref 31.4–37.4)
MCV RBC AUTO: 110 FL (ref 82–98)
PLATELET # BLD AUTO: 68 THOUSANDS/UL (ref 149–390)
PMV BLD AUTO: 10.2 FL (ref 8.9–12.7)
POTASSIUM SERPL-SCNC: 4.8 MMOL/L (ref 3.5–5.3)
PROT SERPL-MCNC: 5.5 G/DL (ref 6.4–8.2)
RBC # BLD AUTO: 2.82 MILLION/UL (ref 3.81–5.12)
SODIUM SERPL-SCNC: 141 MMOL/L (ref 136–145)
WBC # BLD AUTO: 3.72 THOUSAND/UL (ref 4.31–10.16)

## 2018-03-19 PROCEDURE — 83735 ASSAY OF MAGNESIUM: CPT | Performed by: INTERNAL MEDICINE

## 2018-03-19 PROCEDURE — 85027 COMPLETE CBC AUTOMATED: CPT | Performed by: INTERNAL MEDICINE

## 2018-03-19 PROCEDURE — 99232 SBSQ HOSP IP/OBS MODERATE 35: CPT | Performed by: INTERNAL MEDICINE

## 2018-03-19 PROCEDURE — 80053 COMPREHEN METABOLIC PANEL: CPT | Performed by: INTERNAL MEDICINE

## 2018-03-19 PROCEDURE — 82948 REAGENT STRIP/BLOOD GLUCOSE: CPT

## 2018-03-19 RX ORDER — INSULIN GLARGINE 100 [IU]/ML
15 INJECTION, SOLUTION SUBCUTANEOUS
Status: DISCONTINUED | OUTPATIENT
Start: 2018-03-19 | End: 2018-03-20 | Stop reason: HOSPADM

## 2018-03-19 RX ORDER — LAMOTRIGINE 25 MG/1
250 TABLET ORAL ONCE
Status: DISCONTINUED | OUTPATIENT
Start: 2018-03-20 | End: 2018-03-19

## 2018-03-19 RX ADMIN — HEPARIN SODIUM 5000 UNITS: 5000 INJECTION, SOLUTION INTRAVENOUS; SUBCUTANEOUS at 14:43

## 2018-03-19 RX ADMIN — AMLODIPINE BESYLATE 2.5 MG: 2.5 TABLET ORAL at 08:44

## 2018-03-19 RX ADMIN — HEPARIN SODIUM 5000 UNITS: 5000 INJECTION, SOLUTION INTRAVENOUS; SUBCUTANEOUS at 22:57

## 2018-03-19 RX ADMIN — INSULIN LISPRO 2 UNITS: 100 INJECTION, SOLUTION INTRAVENOUS; SUBCUTANEOUS at 11:52

## 2018-03-19 RX ADMIN — INSULIN GLARGINE 15 UNITS: 100 INJECTION, SOLUTION SUBCUTANEOUS at 22:57

## 2018-03-19 RX ADMIN — ASPIRIN 81 MG: 81 TABLET, COATED ORAL at 08:44

## 2018-03-19 RX ADMIN — INSULIN LISPRO 3 UNITS: 100 INJECTION, SOLUTION INTRAVENOUS; SUBCUTANEOUS at 11:53

## 2018-03-19 RX ADMIN — INSULIN LISPRO 2 UNITS: 100 INJECTION, SOLUTION INTRAVENOUS; SUBCUTANEOUS at 22:57

## 2018-03-19 RX ADMIN — INSULIN LISPRO 3 UNITS: 100 INJECTION, SOLUTION INTRAVENOUS; SUBCUTANEOUS at 18:15

## 2018-03-19 NOTE — ASSESSMENT & PLAN NOTE
BS are 228  Increased insulin to 15 units  Probably room for more, but will revisit  No further inpatient needs, can go to rehab/DC when appropriate destination established

## 2018-03-19 NOTE — PLAN OF CARE
METABOLIC, FLUID AND ELECTROLYTES - ADULT     Electrolytes maintained within normal limits Progressing     Glucose maintained within target range Progressing        Potential for Falls     Patient will remain free of falls Progressing        Prexisting or High Potential for Compromised Skin Integrity     Skin integrity is maintained or improved Progressing

## 2018-03-19 NOTE — PROGRESS NOTES
Progress Note - Latesha Close 7/9/1927, 80 y o  female MRN: [id number]    Unit/Bed#: -01 Encounter: [id number]    Primary Care Provider: Severa Bos, MD   Date and time admitted to hospital: 3/18/2018  8:14 AM        Hypoglycemia   Assessment & Plan    BS are 228  Increased insulin to 15 units  Probably room for more, but will revisit  No further inpatient needs, can go to rehab/DC when appropriate destination established  CKD (chronic kidney disease)   Assessment & Plan    Creatinine is 2 16  Baseline around 2  No THAI at present  Can monitor while in hospital              Metastatic breast cancer [facility])   Assessment & Plan    Outpatient follow up with primary oncology team          * Altered mental status   Assessment & Plan    Likely 2/2 hypoglycemia  Patient is back to baseline  VTE Pharmacologic Prophylaxis:   Pharmacologic: Heparin  Mechanical VTE Prophylaxis in Place: Yes    Patient Centered Rounds: I have performed bedside rounds with nursing staff today  Discussions with Specialists or Other Care Team Provider: Discussed with specialist  Discussed with oncology - cannot have infusion inpatient  Call infusion center to reschedule if patient is still in hospital      Education and Discussions with Family / Patient: Discussed with patient  Discussed with Family  They are made aware that infusions need to be done outpatient  Time Spent for Care: 30 minutes  More than 50% of total time spent on counseling and coordination of care as described above  Current Length of Stay: 0 day(s)    Current Patient Status: Observation   Certification Statement: The patient will continue to require additional inpatient hospital stay due to awaiting placement  Discharge Plan: Not medically stable for DC  Code Status: Level 1 - Full Code      Subjective:   Patient seen and examined  No new symptoms  Feeling "good"       Ate all her food for breakfast and "liked it a lot"    Objective:     Vitals:   Temp (24hrs), Av 3 °F (36 8 °C), Min:98 2 °F (36 8 °C), Max:98 3 °F (36 8 °C)    HR:  [85-96] 89  Resp:  [16-20] 20  BP: (120-142)/(56-68) 142/68  SpO2:  [97 %-98 %] 97 %  Body mass index is 27 88 kg/m²  Input and Output Summary (last 24 hours): Intake/Output Summary (Last 24 hours) at 18 1357  Last data filed at 18 0237   Gross per 24 hour   Intake              180 ml   Output              250 ml   Net              -70 ml       Physical Exam:     Physical Exam   HENT:   Head: Normocephalic and atraumatic  Mouth/Throat: No oropharyngeal exudate  Eyes: Right eye exhibits no discharge  Left eye exhibits no discharge  No scleral icterus  Neck: No JVD present  No tracheal deviation present  No thyromegaly present  Cardiovascular: Normal rate and regular rhythm  Exam reveals no gallop and no friction rub  No murmur heard  Pulmonary/Chest: Effort normal  No respiratory distress  She has no wheezes  She has no rales  She exhibits no tenderness  Abdominal: Soft  She exhibits no distension and no mass  There is no tenderness  There is no rebound and no guarding  Musculoskeletal: She exhibits no edema, tenderness or deformity  Lymphadenopathy:     She has no cervical adenopathy  Neurological: She is alert  No cranial nerve deficit  Coordination normal    Skin: No rash noted  No erythema  There is pallor  Psychiatric: She has a normal mood and affect           Additional Data:     Labs:      Results from last 7 days  Lab Units 18  0531 18  0944   WBC Thousand/uL 3 72* 2 91*   HEMOGLOBIN g/dL 10 1* 12 4   HEMATOCRIT % 30 9* 36 8   PLATELETS Thousands/uL 68* 82*   NEUTROS PCT %  --  85*   LYMPHS PCT %  --  12*   MONOS PCT %  --  3*   EOS PCT %  --  0       Results from last 7 days  Lab Units 18  0534   SODIUM mmol/L 141   POTASSIUM mmol/L 4 8   CHLORIDE mmol/L 107   CO2 mmol/L 27   BUN mg/dL 42*   CREATININE mg/dL 2 16* CALCIUM mg/dL 8 7   TOTAL PROTEIN g/dL 5 5*   BILIRUBIN TOTAL mg/dL 1 00   ALK PHOS U/L 153*   ALT U/L 62   AST U/L 86*   GLUCOSE RANDOM mg/dL 219*       Results from last 7 days  Lab Units 03/18/18  0945   INR  1 03       * I Have Reviewed All Lab Data Listed Above  * Additional Pertinent Lab Tests Reviewed: All Labs For Current Hospital Admission Reviewed    Imaging:    Imaging Reports Reviewed Today Include:   CXR -   "No acute pulmonary disease  Increasing number of sclerotic lesions within the osseous structures suggesting worsening metastatic disease "  Imaging Personally Reviewed by Myself Includes:    As above  Recent Cultures (last 7 days):           Last 24 Hours Medication List:     Current Facility-Administered Medications:  amLODIPine 2 5 mg Oral Daily Ramiro Enrique MD   aspirin 81 mg Oral Daily Ramiro Enrique MD   heparin (porcine) 5,000 Units Subcutaneous [facility] Ramiro Enrique MD   insulin glargine 15 Units Subcutaneous HS Ramiro Enrique MD   insulin lispro 1-5 Units Subcutaneous TID Diego Cox MD   insulin lispro 1-5 Units Subcutaneous HS Ramiro Enrique MD   insulin lispro 3 Units Subcutaneous TID With Meals Ramiro Enrique MD   palbociclib 100 mg Oral Daily Ramiro Enrique MD     Facility-Administered Medications Ordered in Other Encounters:  [START ON 3/20/2018] alteplase 2 mg Intracatheter PRN Maria Luisa Blankenship MD   [START ON 3/20/2018] denosumab 120 mg Subcutaneous Once Maria Luisa Blankenship MD   [START ON 3/20/2018] fulvestrant 250 mg Intramuscular Once Maria Luisa Blankenship MD   And       [START ON 3/20/2018] fulvestrant 250 mg Intramuscular Once Maria Luisa Blankenship MD   [START ON 3/20/2018] heparin lock flush 300 Units Intracatheter PRN Maria Luisa Blankenship MD        Today, Patient Was Seen By: Ramiro Enrique MD    ** Please Note: Dictation voice to text software may have been used in the creation of this document   **

## 2018-03-19 NOTE — CASE MANAGEMENT
Initial Clinical Review    Admission: Date/Time/Statement: 03/18/2018 @ 13:03    Orders Placed This Encounter   Procedures    Place in Observation (expected length of stay for this patient is less than two midnights)     Standing Status:   Standing     Number of Occurrences:   1     Order Specific Question:   Admitting Physician     Answer:   Love Bui     Order Specific Question:   Level of Care     Answer:   Med Surg [16]         ED: Date/Time/Mode of Arrival:   ED Arrival Information     Expected Arrival Acuity Means of Arrival Escorted By Service Admission Type    - 3/18/2018 08:14 [facility] [id number] Emergency    Arrival Complaint    Altered Mental Status          Chief Complaint:   Chief Complaint   Patient presents with    Altered Mental Status     pt presents from [facility] via ems, inital report pt unresponsive when EMS got there she was concsious but lethargic, is IDDM,  BS was 59, was given D10  EMS also reports on gown had brownish/coffee ground emesis       History of Illness: 79 y/o F with Pmhx of HTN, primary breast CA with mets to the liver and bones (currently receiving infusions as Tx), who presents to the ED via EMS from [address] for AMS  Per EMS, patient was found to be unresponsive this morning in her bed with some emesis on her shirt  EMS was called and found her blood sugar to be 59  She was given D10, which improved her mental status  On arrival in the ED, her repeat blood sugar was 197, and she was awake and alert  Per daughter at bedside, patient has been ill with bronchitis for the past few weeks, and her blood sugars have been elevated  The intermediate physician has been altering her insulin levels in an attempt to manage the hyperglycemia  Patient denies any pain at this time  Denies headaches, dizziness, chest pain, SOB, nausea  Denies fevers or chills      ED Vital Signs:   ED Triage Vitals   Temperature Pulse Respirations Blood Pressure SpO2   03/18/18 0844 03/18/18 0818 03/18/18 0818 03/18/18 0818 03/18/18 0818   (!) 92 4 °F (33 6 °C) 81 20 134/62 95 %      Temp Source Heart Rate Source Patient Position - Orthostatic VS BP Location FiO2 (%)   03/18/18 0844 03/18/18 0818 03/18/18 0818 03/18/18 0818 --   Rectal Monitor Sitting Right arm       Pain Score       03/18/18 0818       No Pain        Wt Readings from Last 1 Encounters:   03/18/18 76 kg (167 lb 8 8 oz)       Abnormal Labs/Diagnostic Test Results:     WBC Thousand/uL 2 91*   PLATELETS Thousands/uL 82*   NEUTROS PCT % 85*   LYMPHS PCT % 12*   MONOS PCT % 3*     BUN mg/dL 38*   CREATININE mg/dL 1 98*   TOTAL PROTEIN g/dL 6 3*   BILIRUBIN TOTAL mg/dL 1 10*   ALK PHOS U/L 188*   AST U/L 102*   GLUCOSE RANDOM mg/dL 148*     UA w Reflex to Microscopic w Reflex to Culture [[id number]] (Abnormal)   Lab Status: Final result    03/18/2018 Specimen: Urine from Urine, Straight Cath    Color, UA   yellow    Clarity, UA  cloudy    Specific Gravity, UA >=1 030 1 003 - 1 030    pH, UA 5 0 4 5 - 8 0    Leukocytes, UA Trace (A) Negative    Nitrite, UA Negative Negative    Protein, UA 30 (1+) (A) Negative mg/dl    Glucose, UA Negative Negative mg/dl    Ketones, UA Negative Negative mg/dl    Urobilinogen, UA 0 2 0 2, 1 0 E U /dl E U /dl    Bilirubin, UA Negative Negative    Blood, UA Large (A) Negative   Urine Microscopic [[id number]] (Abnormal)   Lab Status: Final result Specimen: Urine from Urine, Straight Cath    RBC, UA 20-30 (A) None Seen, 0-5 /hpf    WBC, UA 4-10 (A) None Seen, 0-5, 5-55, 5-65 /hpf    Epithelial Cells Occasional None Seen, Occasional /hpf    Bacteria, UA Innumerable (A) None Seen, Occasional      CXR: No acute pulmonary disease  Increasing number of sclerotic lesions within the osseous structures suggesting worsening metastatic disease      EKG: Normal sinus rhythm, Nonspecific ST abnormality    ED Treatment:   Medication Administration from 03/18/2018 0814 to 03/18/2018 1422       Date/Time Order Dose Route Action Action by Comments     03/18/2018 0910 sodium chloride 0 9 % bolus 500 mL 0 mL Intravenous Stopped Meenakshi Bailey RN      03/18/2018 0835 sodium chloride 0 9 % bolus 500 mL 500 mL Intravenous New Bag Karen Andrade RN         Physical Exam   Constitutional: She appears well-developed and well-nourished  Dry mucous membranes  Cardiovascular: Normal rate, regular rhythm and intact distal pulses  Pulmonary/Chest: Effort normal and breath sounds normal  She has no wheezes  She has no rales  Neurological: She is alert  No cranial nerve deficit or sensory deficit  She exhibits normal muscle tone  Coordination normal    Normal finger to nose  Negative pronator drift  Oriented x2 (person and place - this is base line per daughter at bedside)  Past Medical/Surgical History: Active Ambulatory Problems     Diagnosis Date Noted    Cancer, metastatic to lung ([facility] ) 03/09/2016    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[facility] [facility] ) 03/09/2016    Liver metastases ([facility] ) 06/08/2017    Bone metastasis ([facility] ) 03/15/2018     Resolved Ambulatory Problems     Diagnosis Date Noted    No Resolved Ambulatory Problems     Past Medical History:   Diagnosis Date    Anemia     Bone metastases ([facility] )     Breast cancer ([facility] )     Diabetes mellitus ([facility] )     Hemochromatosis     Hypertension     Liver metastases ([facility] )        Admitting Diagnosis: Altered mental state [R41 82]  Hypoglycemia associated with diabetes ([facility] [facility] ) [E11 649]  Altered mental status, unspecified [R41 82]    Age/Sex: 80 y o  female    Assessment/Plan:     Hypoglycemia   Assessment & Plan     BS have come up  Unclear if this 2/2 poor oral intake or to high an insulin dose       Patient was on lantus 50 units once a day       Will decrease this to 10 units for now and titrate up as needed            CKD (chronic kidney disease)   Assessment & Plan     Creatinine is 1 98  Baseline around 2  No THAI                  Metastatic breast cancer [facility])   Assessment & Plan     Outpatient follow up with primary oncology team            * Altered mental status   Assessment & Plan     Likely 2/2 hypoglycemia  Family are at bedside and state that patient is back to baseline       She appears to be eating well and has eaten most of her meal - chicken nuggets       I will check a CT scan for completion given her background of metastatic cancer               VTE Prophylaxis: Heparin  / sequential compression device   Code Status: DNR/DNI Level 3  POLST: There is no POLST form on file for this patient (pre-hospital) and POLST form is completed  Discussion with family: Discussed with daughters x 2 at bedside as well as son in law       Anticipated Length of Stay:  Patient will be admitted on an Observation basis with an anticipated length of stay of  less 2 midnights  Justification for Hospital Stay: altered mental status, general decline in assisted living facility          Admission Orders:  Observation/MedSurg  Bilateral Sequential Compression Device  OT/PT Consult  SSI  Scheduled Meds:   Current Facility-Administered Medications:  amLODIPine 2 5 mg Oral Daily   aspirin 81 mg Oral Daily   heparin (porcine) 5,000 Units Subcutaneous Q8H [facility] & longterm   insulin glargine 15 Units Subcutaneous HS   insulin lispro 1-5 Units Subcutaneous TID AC   insulin lispro 1-5 Units Subcutaneous HS   insulin lispro 3 Units Subcutaneous TID With Meals   palbociclib 100 mg Oral Daily

## 2018-03-19 NOTE — CASE MANAGEMENT
Continued Stay Review    Date: 03/19/2018    Vital Signs: /68 (BP Location: Right arm)   Pulse 89   Temp 98 3 °F (36 8 °C) (Oral)   Resp 20   Ht 5' 5" (1 651 m)   Wt 76 kg (167 lb 8 8 oz)   LMP  (LMP Unknown)   SpO2 97%   BMI 27 88 kg/m²     Medications:   Scheduled Meds:   Current Facility-Administered Medications:  amLODIPine 2 5 mg Oral Daily   aspirin 81 mg Oral Daily   heparin (porcine) 5,000 Units Subcutaneous Q8H [address]   insulin glargine 15 Units Subcutaneous HS   insulin lispro 1-5 Units Subcutaneous TID AC   insulin lispro 1-5 Units Subcutaneous HS   insulin lispro 3 Units Subcutaneous TID With Meals   palbociclib 100 mg Oral Daily     Facility-Administered Medications Ordered in Other Encounters:  [START ON 3/20/2018] alteplase 2 mg Intracatheter PRN   [START ON 3/20/2018] denosumab 120 mg Subcutaneous Once   [START ON 3/20/2018] fulvestrant 250 mg Intramuscular Once   And      [START ON 3/20/2018] fulvestrant 250 mg Intramuscular Once   [START ON 3/20/2018] heparin lock flush 300 Units Intracatheter PRN     Abnormal Labs/Diagnostic Results: H/H 10 1/30 9, WBC 3 72, Platelets 68, Bun 42, Creat 2 16, Alk Phos 153, AST 86, Glucose 219  Age/Sex: 80 y o  female     Assessment/Plan:     Hypoglycemia   Assessment & Plan     BS are 228  Increased insulin to 15 units  Probably room for more, but will revisit  No further inpatient needs, can go to rehab/DC when appropriate destination established            CKD (chronic kidney disease)   Assessment & Plan     Creatinine is 2 16  Baseline around 2     No THAI at present       Can monitor while in hospital                  Metastatic breast cancer [facility])   Assessment & Plan     Outpatient follow up with primary oncology team            * Altered mental status   Assessment & Plan     Likely 2/2 hypoglycemia       Patient is back to baseline                  VTE Pharmacologic Prophylaxis:   Pharmacologic: Heparin  Mechanical VTE Prophylaxis in Place: Yes     Patient Centered Rounds: I have performed bedside rounds with nursing staff today      Discussions with Specialists or Other Care Team Provider: Discussed with specialist  Discussed with oncology - cannot have infusion inpatient  Call infusion center to reschedule if patient is still in hospital       Education and Discussions with Family / Patient: Discussed with patient  Discussed with Family  They are made aware that infusions need to be done outpatient        Current Length of Stay: 0 day(s)     Current Patient Status: Observation   Certification Statement: The patient will continue to require additional inpatient hospital stay due to awaiting placement       Discharge Plan: Not medically stable for DC

## 2018-03-20 ENCOUNTER — HOSPITAL ENCOUNTER (OUTPATIENT)
Dept: INFUSION CENTER | Facility: CLINIC | Age: 83
Discharge: HOME | End: 2018-03-20

## 2018-03-20 VITALS
OXYGEN SATURATION: 98 % | RESPIRATION RATE: 18 BRPM | HEART RATE: 92 BPM | WEIGHT: 168.43 LBS | DIASTOLIC BLOOD PRESSURE: 67 MMHG | HEIGHT: 65 IN | TEMPERATURE: 98.2 F | BODY MASS INDEX: 28.06 KG/M2 | SYSTOLIC BLOOD PRESSURE: 137 MMHG

## 2018-03-20 LAB
ANION GAP SERPL CALCULATED.3IONS-SCNC: 7 MMOL/L (ref 4–13)
BASOPHILS # BLD AUTO: 0.01 THOUSANDS/ΜL (ref 0–0.1)
BASOPHILS NFR BLD AUTO: 0 % (ref 0–1)
BUN SERPL-MCNC: 39 MG/DL (ref 5–25)
CALCIUM SERPL-MCNC: 8.9 MG/DL (ref 8.3–10.1)
CHLORIDE SERPL-SCNC: 106 MMOL/L (ref 100–108)
CO2 SERPL-SCNC: 27 MMOL/L (ref 21–32)
CREAT SERPL-MCNC: 2.01 MG/DL (ref 0.6–1.3)
EOSINOPHIL # BLD AUTO: 0.01 THOUSAND/ΜL (ref 0–0.61)
EOSINOPHIL NFR BLD AUTO: 0 % (ref 0–6)
ERYTHROCYTE [DISTWIDTH] IN BLOOD BY AUTOMATED COUNT: 14.1 % (ref 11.6–15.1)
GFR SERPL CREATININE-BSD FRML MDRD: 21 ML/MIN/1.73SQ M
GLUCOSE SERPL-MCNC: 194 MG/DL (ref 65–140)
GLUCOSE SERPL-MCNC: 195 MG/DL (ref 65–140)
GLUCOSE SERPL-MCNC: 202 MG/DL (ref 65–140)
HCT VFR BLD AUTO: 30.9 % (ref 34.8–46.1)
HGB BLD-MCNC: 10.1 G/DL (ref 11.5–15.4)
LYMPHOCYTES # BLD AUTO: 0.71 THOUSANDS/ΜL (ref 0.6–4.47)
LYMPHOCYTES NFR BLD AUTO: 28 % (ref 14–44)
MCH RBC QN AUTO: 35.9 PG (ref 26.8–34.3)
MCHC RBC AUTO-ENTMCNC: 32.7 G/DL (ref 31.4–37.4)
MCV RBC AUTO: 110 FL (ref 82–98)
MONOCYTES # BLD AUTO: 0.12 THOUSAND/ΜL (ref 0.17–1.22)
MONOCYTES NFR BLD AUTO: 5 % (ref 4–12)
NEUTROPHILS # BLD AUTO: 1.68 THOUSANDS/ΜL (ref 1.85–7.62)
NEUTS SEG NFR BLD AUTO: 67 % (ref 43–75)
PLATELET # BLD AUTO: 65 THOUSANDS/UL (ref 149–390)
PMV BLD AUTO: 10.2 FL (ref 8.9–12.7)
POTASSIUM SERPL-SCNC: 4.6 MMOL/L (ref 3.5–5.3)
RBC # BLD AUTO: 2.81 MILLION/UL (ref 3.81–5.12)
SODIUM SERPL-SCNC: 140 MMOL/L (ref 136–145)
WBC # BLD AUTO: 2.53 THOUSAND/UL (ref 4.31–10.16)

## 2018-03-20 PROCEDURE — 80048 BASIC METABOLIC PNL TOTAL CA: CPT | Performed by: INTERNAL MEDICINE

## 2018-03-20 PROCEDURE — 99238 HOSP IP/OBS DSCHRG MGMT 30/<: CPT | Performed by: FAMILY MEDICINE

## 2018-03-20 PROCEDURE — G8979 MOBILITY GOAL STATUS: HCPCS | Performed by: PHYSICAL THERAPIST

## 2018-03-20 PROCEDURE — 97163 PT EVAL HIGH COMPLEX 45 MIN: CPT | Performed by: PHYSICAL THERAPIST

## 2018-03-20 PROCEDURE — 85025 COMPLETE CBC W/AUTO DIFF WBC: CPT | Performed by: INTERNAL MEDICINE

## 2018-03-20 PROCEDURE — 82948 REAGENT STRIP/BLOOD GLUCOSE: CPT

## 2018-03-20 PROCEDURE — G8978 MOBILITY CURRENT STATUS: HCPCS | Performed by: PHYSICAL THERAPIST

## 2018-03-20 PROCEDURE — G8980 MOBILITY D/C STATUS: HCPCS | Performed by: PHYSICAL THERAPIST

## 2018-03-20 RX ORDER — INSULIN GLARGINE 100 [IU]/ML
15 INJECTION, SOLUTION SUBCUTANEOUS
Qty: 10 ML | Refills: 0 | Status: SHIPPED | OUTPATIENT
Start: 2018-03-20 | End: 2018-05-07

## 2018-03-20 RX ADMIN — INSULIN LISPRO 3 UNITS: 100 INJECTION, SOLUTION INTRAVENOUS; SUBCUTANEOUS at 10:14

## 2018-03-20 RX ADMIN — AMLODIPINE BESYLATE 2.5 MG: 2.5 TABLET ORAL at 08:29

## 2018-03-20 RX ADMIN — HEPARIN SODIUM 5000 UNITS: 5000 INJECTION, SOLUTION INTRAVENOUS; SUBCUTANEOUS at 05:36

## 2018-03-20 RX ADMIN — INSULIN LISPRO 1 UNITS: 100 INJECTION, SOLUTION INTRAVENOUS; SUBCUTANEOUS at 10:14

## 2018-03-20 RX ADMIN — ASPIRIN 81 MG: 81 TABLET, COATED ORAL at 08:29

## 2018-03-20 NOTE — DISCHARGE SUMMARY
Discharge- Dale Stevenson 7/9/1927, 80 y o  female MRN: [id number]    Unit/Bed#: -01 Encounter: [id number]    Primary Care Provider: Paulo Dolan MD   Date and time admitted to hospital: 3/18/2018  8:14 AM        * Altered mental status   Assessment & Plan    Likely 2/2 hypoglycemia  Patient is back to baseline  Hypoglycemia   Assessment & Plan      No further inpatient needs, can go to rehab/DC when appropriate destination established  CKD (chronic kidney disease)   Assessment & Plan    Creatinine is 2 16  Baseline around 2  No THAI at present  Can monitor while in hospital              Metastatic breast cancer [facility])   Assessment & Plan    Outpatient follow up with primary oncology team            Disposition:     Other: prevous living     Consultations During Hospital Stay:  ·     Procedures Performed:     ·     Significant Findings / Test Results:     ·     Incidental Findings:   ·     Test Results Pending at Discharge (will require follow up):   ·      Outpatient Tests Requested:  ·     Complications:      Hospital Course:     Dale Stevenson is a 80 y o  female patient who originally presented to the hospital on 3/18/2018 due to Altered Mental  Status most likely secondary to hypoglycemia  Patient has been clinically stable for the last 24 hours, returned to patient baseline  She will be discharged to previous facility    Condition at Discharge: stable     Discharge Day Visit / Exam:     Subjective:  I am good  No acute distress   Vitals: Blood Pressure: 137/67 (03/20/18 0720)  Pulse: 92 (03/20/18 0720)  Temperature: 98 2 °F (36 8 °C) (03/20/18 0720)  Temp Source: Oral (03/19/18 2224)  Respirations: 18 (03/20/18 0720)  Height: 5' 5" (165 1 cm) (03/18/18 1422)  Weight - Scale: 76 4 kg (168 lb 6 9 oz) (03/20/18 0600)  SpO2: 98 % (03/20/18 0720)  Exam:   Physical Exam   Constitutional: No distress     Cardiovascular: Normal rate, normal heart sounds and intact distal pulses  Exam reveals no gallop and no friction rub  No murmur heard  Pulmonary/Chest: No respiratory distress  She has no wheezes  She has no rales  She exhibits no tenderness  Abdominal: She exhibits no distension and no mass  There is no tenderness  There is no rebound and no guarding  Musculoskeletal: She exhibits no edema or tenderness  Neurological: She is alert  No cranial nerve deficit  Coordination normal    Oriented x1   Skin: Skin is warm  She is not diaphoretic  Discussion with Family:     Discharge instructions/Information to patient and family:   See after visit summary for information provided to patient and family  Provisions for Follow-Up Care:  See after visit summary for information related to follow-up care and any pertinent home health orders  Planned Readmission: NONE     Discharge Statement:  I spent 30  minutes discharging the patient  This time was spent on the day of discharge  I had direct contact with the patient on the day of discharge  Greater than 50% of the total time was spent examining patient, answering all patient questions, arranging and discussing plan of care with patient as well as directly providing post-discharge instructions  Additional time then spent on discharge activities  Discharge Medications:  See after visit summary for reconciled discharge medications provided to patient and family        ** Please Note: This note has been constructed using a voice recognition system **

## 2018-03-20 NOTE — PLAN OF CARE
Problem: DISCHARGE PLANNING - CARE MANAGEMENT  Goal: Discharge to post-acute care or home with appropriate resources  INTERVENTIONS:  - Conduct assessment to determine patient/family and health care team treatment goals, and need for post-acute services based on payer coverage, community resources, and patient preferences, and barriers to discharge  - Address psychosocial, clinical, and financial barriers to discharge as identified in assessment in conjunction with the patient/family and health care team  - Arrange appropriate level of post-acute services according to patients   needs and preference and payer coverage in collaboration with the physician and health care team  - Communicate with and update the patient/family, physician, and health care team regarding progress on the discharge plan  - Arrange appropriate transportation to post-acute venues  Outcome: Completed Date Met: 03/20/18  Pt is stable [place] [facility] [place]  Pt is at his baseline  PT/OT eval and treat script has been provided to him as [facility] [place] him  Daughter transported  No further Cm interventions needed at this time

## 2018-03-20 NOTE — PHYSICAL THERAPY NOTE
Physical Therapy Evaluation      Patient Active Problem List   Diagnosis    Cancer, metastatic to lung (HCC)    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)    Liver metastases (HCC)    Bone metastasis (HCC)    Metastatic breast cancer (HCC)    CKD (chronic kidney disease)    Altered mental status    Hypoglycemia       Past Medical History:   Diagnosis Date    Anemia     Bone metastases [facility] )     Breast cancer ([facility]     Diabetes mellitus ([facility] )     Hemochromatosis     Hypertension     [facility]        History reviewed  No pertinent surgical history  03/20/18 1350   Note Type   Note type Eval only   Pain Assessment   Pain Assessment No/denies pain   Home Living   Type of Home Assisted living  ([facility])   Home Layout One level;Ramped entrance;Elevator   Bathroom Equipment Grab bars in shower;Grab bars around toilet   [address] [facility] [address]   Prior Function   Level of [place] Independent with ADLs and functional mobility; Needs assistance with IADLs   Lives With Facility staff   Receives Help From Personal care attendant   ADL Assistance Independent   IADLs Needs assistance   Falls in the last 6 months 0   Comments pt uses rw for amb  no falls  pt reports being indep with bed mobility, dressing and toileting, assist for shower  Restrictions/Precautions   Other Precautions Cognitive; Fall Risk   General   Family/Caregiver Present Yes   Cognition   Overall Cognitive Status Impaired   Orientation Level Oriented to person   RUE Assessment   RUE Assessment WFL   LUE Assessment   LUE Assessment WFL   RLE Assessment   RLE Assessment X  (strength strength 4+/5)   LLE Assessment   LLE Assessment X  (strength 4+/5)   Coordination   Movements are Fluid and Coordinated 1   Sensation X   Light Touch   RLE Light Touch Impaired   RLE Light Touch Comments foot   LLE Light Touch Impaired   LLE Light Touch Comments foot   Transfers   Sit to Stand 5  Supervision   Stand to Sit 5 Supervision   Stand pivot 5  Supervision   Ambulation/Elevation   Gait pattern WNL; Forward Flexion   Gait Assistance 5  Supervision   Assistive Device Rolling walker   Distance 150'   Balance   Static Sitting Normal   Dynamic Sitting Good   Static Standing Fair   Dynamic Standing Fair -   Ambulatory Fair -   Endurance Deficit   Endurance Deficit No   Activity Tolerance   Activity Tolerance Patient tolerated treatment well   Medical Staff Made Aware cm   Nurse Made Aware yes   Assessment   Assessment pt admitted with change in mental status due to low blood glucose  now resolved and refered to PT  pt normally indep using rw at her [place]  pt demonstated mobility at her baseline level  pt needed supervision for safety due to cognitive decline  pt has mild functional limitations due to mild deficits in strength, balance, cognition, safety awareness, endurance , self care  pt can return to [place]  recommend continued use of rw      Barriers to Discharge None   Goals   Patient Goals go back to long term   Modified Murdock Scale   Modified Murdock Scale 3   Barthel Index   Feeding 10   Bathing 0   Grooming Score 5   Dressing Score 10   Bladder Score 5   Bowels Score 10   Toilet Use Score 5   Transfers (Bed/Chair) Score 10   Mobility (Level Surface) Score 10   Stairs Score 0   Barthel Index Score 65   History: co - morbidities, fall risk, use of assistive device, assist for adl's, cognition,   Exam: impairments in locomotion, musculoskeletal, balance,posturecognition   Clinical: unstable/unpredictable  Complexity:high    Marionette Manner, PT

## 2018-03-20 NOTE — CASE MANAGEMENT
Initial Clinical Review    Admission: Date/Time/Statement: Observation 03/18/2018 @ 13:03  Converted to Inpatient Admission 3/19/18 @ 1503 due to further workup    Orders Placed This Encounter   Procedures    Inpatient Admission     Standing Status:   Standing     Number of Occurrences:   1     Order Specific Question:   Admitting Physician     Answer:   Chhaya Silveira     Order Specific Question:   Level of Care     Answer:   Med Surg [16]     Order Specific Question:   Estimated length of stay     Answer:   More than 2 Midnights     Order Specific Question:   Certification     Answer:   I certify that inpatient services are medically necessary for this patient for a duration of greater than two midnights  See H&P and MD Progress Notes for additional information about the patient's course of treatment           Henri Hansen RN Registered Nurse Signed   Case Management Date of Service: 3/19/2018  9:25 AM         []Hide copied text  Initial Clinical Review     Admission: Date/Time/Statement: 03/18/2018 @ 13:03           Orders Placed This Encounter   Procedures    Place in Observation (expected length of stay for this patient is less than two midnights)       Standing Status:   Standing       Number of Occurrences:   1       Order Specific Question:   Admitting Physician       Answer:   Chhaya Silveira       Order Specific Question:   Level of Care       Answer:   Med Surg [16]            ED: Date/Time/Mode of Arrival:             ED Arrival Information      Expected Arrival Acuity Means of Arrival Escorted By Service Admission Type     - 3/18/2018 08:14 Emergent Ambulance [facility] Emergency     Arrival Complaint     Altered Mental Status             Chief Complaint:        Chief Complaint   Patient presents with    Altered Mental Status       pt presents from [facility] via ems, inital report pt unresponsive when EMS got there she was concsious but lethargic, is IDDM, BS was 59, was given D10  EMS also reports on gown had brownish/coffee ground emesis         History of Illness: 81 y/o F with Pmhx of HTN, primary breast CA with mets to the liver and bones (currently receiving infusions as Tx), who presents to the ED via EMS from [facility] home for AMS  Per EMS, patient was found to be unresponsive this morning in her bed with some emesis on her shirt  EMS was called and found her blood sugar to be 59  She was given D10, which improved her mental status  On arrival in the ED, her repeat blood sugar was 197, and she was awake and alert  Per daughter at bedside, patient has been ill with bronchitis for the past few weeks, and her blood sugars have been elevated  The [facility] physician has been altering her insulin levels in an attempt to manage the hyperglycemia  Patient denies any pain at this time  Denies headaches, dizziness, chest pain, SOB, nausea   Denies fevers or chills      ED Vital Signs:            ED Triage Vitals   Temperature Pulse Respirations Blood Pressure SpO2   03/18/18 0844 03/18/18 0818 03/18/18 0818 03/18/18 0818 03/18/18 0818   (!) 92 4 °F (33 6 °C) 81 20 134/62 95 %       Temp Source Heart Rate Source Patient Position - Orthostatic VS BP Location FiO2 (%)   03/18/18 0844 03/18/18 0818 03/18/18 0818 03/18/18 0818 --   Rectal Monitor Sitting Right arm         Pain Score           03/18/18 0818           No Pain                Wt Readings from Last 1 Encounters:   03/18/18 76 kg (167 lb 8 8 oz)         Abnormal Labs/Diagnostic Test Results:      WBC Thousand/uL 2 91*   PLATELETS Thousands/uL 82*   NEUTROS PCT % 85*   LYMPHS PCT % 12*   MONOS PCT % 3*      BUN mg/dL 38*   CREATININE mg/dL 1 98*   TOTAL PROTEIN g/dL 6 3*   BILIRUBIN TOTAL mg/dL 1 10*   ALK PHOS U/L 188*   AST U/L 102*   GLUCOSE RANDOM mg/dL 148*            UA w Reflex to Microscopic w Reflex to Culture [[id number]] (Abnormal)   Lab Status: Final result    03/18/2018 Specimen: Urine from Urine, Straight Cath     Color, UA   yellow       Clarity, UA  cloudy       Specific Gravity, UA >=1 030 1 003 - 1 030     pH, UA 5 0 4 5 - 8 0     Leukocytes, UA Trace (A) Negative     Nitrite, UA Negative Negative     Protein, UA 30 (1+) (A) Negative mg/dl     Glucose, UA Negative Negative mg/dl     Ketones, UA Negative Negative mg/dl     Urobilinogen, UA 0 2 0 2, 1 0 E U /dl E U /dl     Bilirubin, UA Negative Negative     Blood, UA Large (A) Negative   Urine Microscopic [[id number]] (Abnormal)   Lab Status: Final result Specimen: Urine from Urine, Straight Cath     RBC, UA 20-30 (A) None Seen, 0-5 /hpf     WBC, UA 4-10 (A) None Seen, 0-5, 5-55, 5-65 /hpf     Epithelial Cells Occasional None Seen, Occasional /hpf     Bacteria, UA Innumerable (A) None Seen, Occasional       CXR: No acute pulmonary disease  Increasing number of sclerotic lesions within the osseous structures suggesting worsening metastatic disease      EKG: Normal sinus rhythm, Nonspecific ST abnormality     ED Treatment:              Medication Administration from 03/18/2018 0814 to 03/18/2018 1422        Date/Time Order Dose Route Action Action by Comments       03/18/2018 0910 sodium chloride 0 9 % bolus 500 mL 0 mL Intravenous Stopped Robbie Sotomayor RN         03/18/2018 0835 sodium chloride 0 9 % bolus 500 mL 500 mL Intravenous New Bag Ronda Morrow RN            Physical Exam   Constitutional: She appears well-developed and well-nourished  Dry mucous membranes     Cardiovascular: Normal rate, regular rhythm and intact distal pulses     Pulmonary/Chest: Effort normal and breath sounds normal  She has no wheezes  She has no rales  Neurological: She is alert  No cranial nerve deficit or sensory deficit  She exhibits normal muscle tone  Coordination normal    Normal finger to nose  Negative pronator drift  Oriented x2 (person and place - this is base line per daughter at bedside)        Past Medical/Surgical History:         Active Ambulatory Problems     Diagnosis Date Noted    Cancer, metastatic to lung ([place] [address] ) 03/09/2016    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[place] [address] ) 03/09/2016    Liver metastases ([place] [address] ) 06/08/2017    Bone metastasis ([place] [address] ) 03/15/2018           Resolved Ambulatory Problems     Diagnosis Date Noted    No Resolved Ambulatory Problems           Past Medical History:   Diagnosis Date    Anemia      Bone metastases ([place] [address] )      Breast cancer ([place] [address] )      Diabetes mellitus ([place] [address] )      Hemochromatosis      Hypertension      Liver metastases (HCC)           Admitting Diagnosis: Altered mental state [R41 82]  Hypoglycemia associated with diabetes ([place] [address] ) [E11 649]  Altered mental status, unspecified [R41 82]     Age/Sex: 80 y o  female     Assessment/Plan:          Hypoglycemia   Assessment & Plan     BS have come up  Unclear if this 2/2 poor oral intake or to high an insulin dose       Patient was on lantus 50 units once a day       Will decrease this to 10 units for now and titrate up as needed            CKD (chronic kidney disease)   Assessment & Plan     Creatinine is 1 98  Baseline around 2  No THAI                  Metastatic breast cancer [facility])   Assessment & Plan     Outpatient follow up with primary oncology team            * Altered mental status   Assessment & Plan     Likely 2/2 hypoglycemia  Family are at bedside and state that patient is back to baseline       She appears to be eating well and has eaten most of her meal - chicken nuggets       I will check a CT scan for completion given her background of metastatic cancer               VTE Prophylaxis: Heparin  / sequential compression device   Code Status: DNR/DNI Level 3     POLST: There is no POLST form on file for this patient (pre-hospital) and POLST form is completed  Discussion with family: Discussed with daughters x 2 at bedside as well as son in law       Anticipated Length of Stay: Damian Ramos will be admitted on an Observation basis with an anticipated length of stay of  less 2 midnights    Justification for Hospital Stay: altered mental status, general decline in assisted living facility          Admission Orders:  Observation/MedSurg  Bilateral Sequential Compression Device  OT/PT Consult  SSI  Scheduled Meds:   Current Facility-Administered Medications:  amLODIPine 2 5 mg Oral Daily   aspirin 81 mg Oral Daily   heparin (porcine) 5,000 Units Subcutaneous Q8H [address]   insulin glargine 15 Units Subcutaneous HS   insulin lispro 1-5 Units Subcutaneous TID AC   insulin lispro 1-5 Units Subcutaneous HS   insulin lispro 3 Units Subcutaneous TID With Meals   palbociclib 100 mg Oral Daily

## 2018-03-20 NOTE — SOCIAL WORK
Pt is stable to return to [facility]  Pt is at his baseline  PT/OT eval and treat script has been provided to him as [facility] will have therapy for him  Daughter transported  No further Cm interventions needed at this time

## 2018-03-21 NOTE — PHYSICIAN ADVISOR
Current patient class: Inpatient  The patient is currently on Hospital Day: 3      The patient was admitted to the hospital at 1503 on 3/19/18 for the following diagnosis:  Altered mental state [R41 82]  Hypoglycemia associated with diabetes ([address] ) [E11 649]  Altered mental status, unspecified [R41 82]       There is documentation in the medical record of an expected length of stay of at least 2 midnights  The patient is therefore expected to satisfy the 2 midnight benchmark and given the 2 midnight presumption is appropriate for INPATIENT ADMISSION  Given this expectation of a satisfying stay, CMS instructs us that the patient is most often appropriate for inpatient admission under part A provided medical necessity is documented in the chart  After review of the relevant documentation, labs, vital signs and test results, the patient is appropriate for INPATIENT ADMISSION  Admission to the hospital as an inpatient is a complex decision making process which requires the practitioner to consider the patients presenting complaint, history and physical examination and all relevant testing  With this in mind, in this case, the patient was deemed appropriate for INPATIENT ADMISSION  After review of the documentation and testing available at the time of the admission I concur with this clinical determination of medical necessity  Rationale is as follows: The patient is a 80 yrs old Female who presented to the ED at 3/18/2018  8:14 AM with a chief complaint of Altered Mental Status (pt presents from [facility] via ems, inital report pt unresponsive when EMS got there she was concsious but lethargic, is IDDM,  BS was 59, was given D10   EMS also reports on gown had brownish/coffee ground emesis)    The patients vitals on arrival were ED Triage Vitals   Temperature Pulse Respirations Blood Pressure SpO2   03/18/18 0844 03/18/18 0818 03/18/18 0818 03/18/18 0818 03/18/18 0818   (!) 92 4 °F (33 6 °C) 81 20 134/62 95 %      Temp Source Heart Rate Source Patient Position - Orthostatic VS BP Location FiO2 (%)   03/18/18 0844 03/18/18 0818 03/18/18 0818 03/18/18 0818 --   Rectal Monitor Sitting Right arm       Pain Score       03/18/18 0818       No Pain           Past Medical History:   Diagnosis Date    Anemia     Bone metastases [facility] )     Breast cancer ([place] [address] )     Diabetes mellitus ([place] [address] )     Hemochromatosis     Hypertension     Liver metastases ([place] [address] )      History reviewed  No pertinent surgical history  Consults have been placed to:   None    Vitals:    03/19/18 1507 03/19/18 2224 03/20/18 0600 03/20/18 0720   BP: 147/70 133/61  137/67   BP Location: Right arm Right arm  Right arm   Pulse: 94 88  92   Resp: 19 18 18   Temp: 98 2 °F (36 8 °C) 98 2 °F (36 8 °C)  98 2 °F (36 8 °C)   TempSrc: Oral Oral     SpO2: 98% 96%  98%   Weight:   76 4 kg (168 lb 6 9 oz)    Height:           Most recent labs:    Recent Labs      03/18/18   0944  03/18/18   0945   03/19/18   0534  03/20/18   0450   WBC  2 91*   --    < >   --   2 53*   HGB  12 4   --    < >   --   10 1*   HCT  36 8   --    < >   --   30 9*   PLT  82*   --    < >   --   65*   K  4 8   --    --   4 8  4 6   NA  140   --    --   141  140   CALCIUM  9 3   --    --   8 7  8 9   BUN  38*   --    --   42*  39*   CREATININE  1 98*   --    --   2 16*  2 01*   INR   --   1 03   --    --    --    TROPONINI  <0 02   --    --    --    --    AST  102*   --    --   86*   --    ALT  74   --    --   62   --    ALKPHOS  188*   --    --   153*   --    BILITOT  1 10*   --    --   1 00   --     < > = values in this interval not displayed  Scheduled Meds:  Continuous Infusions:  No current facility-administered medications for this encounter  PRN Meds:      Surgical procedures (if appropriate):

## 2018-03-23 ENCOUNTER — TELEPHONE (OUTPATIENT)
Dept: HEMATOLOGY ONCOLOGY | Facility: CLINIC | Age: 83
End: 2018-03-23

## 2018-03-23 NOTE — TELEPHONE ENCOUNTER
Daughter calling for her mother  She missed her last Xgeva and Fulvestrant due to being in the hospital  Asking if that is OK to just skip and still get the next injections on 4/17th, or if she needs to reschedule the ones she missed

## 2018-03-23 NOTE — TELEPHONE ENCOUNTER
Patient just got discharged from the hospital for bronchitis and hypoglycemia  Patient still has a cough from the bronchitis  Patient's daughter is in agreement to let the patient recover and just proceed with restarting the patient on Xgeva and Faslodex on 4/17/18

## 2018-03-27 ENCOUNTER — PATIENT OUTREACH (OUTPATIENT)
Dept: OTHER | Facility: HOSPITAL | Age: 83
End: 2018-03-27

## 2018-03-27 NOTE — PROGRESS NOTES
Called Lizbet Dow' daughter to check on Kaylin Jaramlilo' current health status, to introduce myself and explain the outpatient care management program  Unable to reach her  Left voicemail with my contact information and a request for a return call  First attempt

## 2018-04-14 ENCOUNTER — APPOINTMENT (OUTPATIENT)
Dept: LAB | Facility: MEDICAL CENTER | Age: 83
End: 2018-04-14
Payer: MEDICARE

## 2018-04-14 DIAGNOSIS — Z17.0 MALIGNANT NEOPLASM OF CENTRAL PORTION OF RIGHT BREAST IN FEMALE, ESTROGEN RECEPTOR POSITIVE (HCC): ICD-10-CM

## 2018-04-14 DIAGNOSIS — C50.111 MALIGNANT NEOPLASM OF CENTRAL PORTION OF RIGHT BREAST IN FEMALE, ESTROGEN RECEPTOR POSITIVE (HCC): ICD-10-CM

## 2018-04-14 LAB
ALBUMIN SERPL BCP-MCNC: 2.5 G/DL (ref 3.5–5)
ALP SERPL-CCNC: 221 U/L (ref 46–116)
ALT SERPL W P-5'-P-CCNC: 69 U/L (ref 12–78)
ANION GAP SERPL CALCULATED.3IONS-SCNC: 6 MMOL/L (ref 4–13)
AST SERPL W P-5'-P-CCNC: 100 U/L (ref 5–45)
BASOPHILS # BLD AUTO: 0.02 THOUSANDS/ΜL (ref 0–0.1)
BASOPHILS NFR BLD AUTO: 1 % (ref 0–1)
BILIRUB SERPL-MCNC: 1.55 MG/DL (ref 0.2–1)
BUN SERPL-MCNC: 28 MG/DL (ref 5–25)
CALCIUM SERPL-MCNC: 9.3 MG/DL
CANCER AG27-29 SERPL-ACNC: 2066 U/ML (ref 0–42.3)
CHLORIDE SERPL-SCNC: 104 MMOL/L (ref 100–108)
CO2 SERPL-SCNC: 27 MMOL/L (ref 21–32)
CREAT SERPL-MCNC: 1.97 MG/DL (ref 0.6–1.3)
EOSINOPHIL # BLD AUTO: 0.02 THOUSAND/ΜL (ref 0–0.61)
EOSINOPHIL NFR BLD AUTO: 1 % (ref 0–6)
ERYTHROCYTE [DISTWIDTH] IN BLOOD BY AUTOMATED COUNT: 16.1 % (ref 11.6–15.1)
GFR SERPL CREATININE-BSD FRML MDRD: 22 ML/MIN/1.73SQ M
GLUCOSE P FAST SERPL-MCNC: 318 MG/DL (ref 65–99)
HCT VFR BLD AUTO: 36.2 % (ref 34.8–46.1)
HGB BLD-MCNC: 12.1 G/DL (ref 11.5–15.4)
LYMPHOCYTES # BLD AUTO: 0.98 THOUSANDS/ΜL (ref 0.6–4.47)
LYMPHOCYTES NFR BLD AUTO: 35 % (ref 14–44)
MCH RBC QN AUTO: 37.9 PG (ref 26.8–34.3)
MCHC RBC AUTO-ENTMCNC: 33.4 G/DL (ref 31.4–37.4)
MCV RBC AUTO: 114 FL (ref 82–98)
MONOCYTES # BLD AUTO: 0.22 THOUSAND/ΜL (ref 0.17–1.22)
MONOCYTES NFR BLD AUTO: 8 % (ref 4–12)
NEUTROPHILS # BLD AUTO: 1.59 THOUSANDS/ΜL (ref 1.85–7.62)
NEUTS SEG NFR BLD AUTO: 55 % (ref 43–75)
NRBC BLD AUTO-RTO: 0 /100 WBCS
PLATELET # BLD AUTO: 93 THOUSANDS/UL (ref 149–390)
PMV BLD AUTO: 11.1 FL (ref 8.9–12.7)
POTASSIUM SERPL-SCNC: 4.4 MMOL/L (ref 3.5–5.3)
PROT SERPL-MCNC: 6.4 G/DL (ref 6.4–8.2)
RBC # BLD AUTO: 3.19 MILLION/UL (ref 3.81–5.12)
SODIUM SERPL-SCNC: 137 MMOL/L (ref 136–145)
WBC # BLD AUTO: 2.84 THOUSAND/UL (ref 4.31–10.16)

## 2018-04-14 PROCEDURE — 86300 IMMUNOASSAY TUMOR CA 15-3: CPT

## 2018-04-14 PROCEDURE — 85025 COMPLETE CBC W/AUTO DIFF WBC: CPT

## 2018-04-14 PROCEDURE — 80053 COMPREHEN METABOLIC PANEL: CPT

## 2018-04-14 PROCEDURE — 36415 COLL VENOUS BLD VENIPUNCTURE: CPT

## 2018-04-16 RX ORDER — LAMOTRIGINE 25 MG/1
500 TABLET ORAL ONCE
Status: DISCONTINUED | OUTPATIENT
Start: 2018-04-17 | End: 2018-04-16

## 2018-04-16 RX ORDER — LAMOTRIGINE 25 MG/1
250 TABLET ORAL ONCE
Status: COMPLETED | OUTPATIENT
Start: 2018-04-17 | End: 2018-04-17

## 2018-04-17 ENCOUNTER — HOSPITAL ENCOUNTER (OUTPATIENT)
Dept: INFUSION CENTER | Facility: CLINIC | Age: 83
Discharge: HOME | End: 2018-04-17
Payer: MEDICARE

## 2018-04-17 PROCEDURE — 96402 CHEMO HORMON ANTINEOPL SQ/IM: CPT

## 2018-04-17 PROCEDURE — 96401 CHEMO ANTI-NEOPL SQ/IM: CPT

## 2018-04-17 RX ADMIN — FULVESTRANT 250 MG: 50 INJECTION INTRAMUSCULAR at 10:14

## 2018-04-17 RX ADMIN — DENOSUMAB 120 MG: 120 INJECTION SUBCUTANEOUS at 10:11

## 2018-04-17 NOTE — PLAN OF CARE
Problem: Potential for Falls  Goal: Patient will remain free of falls  INTERVENTIONS:  - Assess patient frequently for physical needs  -  Identify cognitive and physical deficits and behaviors that affect risk of falls    -  [place] fall precautions as indicated by assessment   - Educate patient/family on patient safety including physical limitations  - Instruct patient to call for assistance with activity based on assessment  - Modify environment to reduce risk of injury  - Consider OT/PT consult to assist with strengthening/mobility   Outcome: Progressing

## 2018-04-17 NOTE — PROGRESS NOTES
Pt is here for faslodex and xgeva  She offers no complaints at this time  Calcium is 9 3 which meets the parameters for xgeva  Xgeva given in left arm and tolerated it well  faslodex given in each buttocks and she tolerated it well   Next appointment confirmed and avs given

## 2018-05-04 DIAGNOSIS — Z17.0 MALIGNANT NEOPLASM OF CENTRAL PORTION OF RIGHT BREAST IN FEMALE, ESTROGEN RECEPTOR POSITIVE (HCC): ICD-10-CM

## 2018-05-04 DIAGNOSIS — C50.111 MALIGNANT NEOPLASM OF CENTRAL PORTION OF RIGHT BREAST IN FEMALE, ESTROGEN RECEPTOR POSITIVE (HCC): ICD-10-CM

## 2018-05-07 ENCOUNTER — HOSPITAL ENCOUNTER (INPATIENT)
Facility: HOSPITAL | Age: 83
LOS: 2 days | Discharge: HOME HEALTH | DRG: 947 | End: 2018-05-09
Attending: EMERGENCY MEDICINE | Admitting: INTERNAL MEDICINE
Payer: MEDICARE

## 2018-05-07 ENCOUNTER — APPOINTMENT (INPATIENT)
Dept: ULTRASOUND IMAGING | Facility: HOSPITAL | Age: 83
DRG: 947 | End: 2018-05-07
Payer: MEDICARE

## 2018-05-07 ENCOUNTER — APPOINTMENT (EMERGENCY)
Dept: CT IMAGING | Facility: HOSPITAL | Age: 83
DRG: 947 | End: 2018-05-07
Payer: MEDICARE

## 2018-05-07 ENCOUNTER — APPOINTMENT (EMERGENCY)
Dept: RADIOLOGY | Facility: HOSPITAL | Age: 83
DRG: 947 | End: 2018-05-07
Payer: MEDICARE

## 2018-05-07 DIAGNOSIS — S51.012A ELBOW LACERATION, LEFT, INITIAL ENCOUNTER: ICD-10-CM

## 2018-05-07 DIAGNOSIS — R55 SYNCOPE: Primary | ICD-10-CM

## 2018-05-07 DIAGNOSIS — R60.0 BILATERAL LOWER EXTREMITY EDEMA: ICD-10-CM

## 2018-05-07 DIAGNOSIS — C78.00 MALIGNANT NEOPLASM METASTATIC TO LUNG, UNSPECIFIED LATERALITY (HCC): ICD-10-CM

## 2018-05-07 DIAGNOSIS — C50.919 METASTATIC BREAST CANCER (HCC): ICD-10-CM

## 2018-05-07 DIAGNOSIS — D61.818 PANCYTOPENIA (HCC): ICD-10-CM

## 2018-05-07 PROBLEM — Z79.4 TYPE 2 DIABETES MELLITUS WITHOUT COMPLICATION, WITH LONG-TERM CURRENT USE OF INSULIN (HCC): Chronic | Status: ACTIVE | Noted: 2018-05-07

## 2018-05-07 PROBLEM — E11.9 TYPE 2 DIABETES MELLITUS WITHOUT COMPLICATION, WITH LONG-TERM CURRENT USE OF INSULIN (HCC): Chronic | Status: ACTIVE | Noted: 2018-05-07

## 2018-05-07 LAB
ALBUMIN SERPL BCP-MCNC: 2.1 G/DL (ref 3.5–5)
ALP SERPL-CCNC: 275 U/L (ref 46–116)
ALT SERPL W P-5'-P-CCNC: 86 U/L (ref 12–78)
ANION GAP SERPL CALCULATED.3IONS-SCNC: 9 MMOL/L (ref 4–13)
APTT PPP: 29 SECONDS (ref 23–35)
AST SERPL W P-5'-P-CCNC: 122 U/L (ref 5–45)
BASOPHILS # BLD AUTO: 0.01 THOUSANDS/ΜL (ref 0–0.1)
BASOPHILS NFR BLD AUTO: 0 % (ref 0–1)
BILIRUB SERPL-MCNC: 1.8 MG/DL (ref 0.2–1)
BUN SERPL-MCNC: 39 MG/DL (ref 5–25)
CALCIUM SERPL-MCNC: 9.7 MG/DL (ref 8.3–10.1)
CHLORIDE SERPL-SCNC: 104 MMOL/L (ref 100–108)
CO2 SERPL-SCNC: 24 MMOL/L (ref 21–32)
CREAT SERPL-MCNC: 2.13 MG/DL (ref 0.6–1.3)
EOSINOPHIL # BLD AUTO: 0.01 THOUSAND/ΜL (ref 0–0.61)
EOSINOPHIL NFR BLD AUTO: 0 % (ref 0–6)
ERYTHROCYTE [DISTWIDTH] IN BLOOD BY AUTOMATED COUNT: 15.8 % (ref 11.6–15.1)
GFR SERPL CREATININE-BSD FRML MDRD: 20 ML/MIN/1.73SQ M
GLUCOSE SERPL-MCNC: 198 MG/DL (ref 65–140)
GLUCOSE SERPL-MCNC: 303 MG/DL (ref 65–140)
HCT VFR BLD AUTO: 33.4 % (ref 34.8–46.1)
HGB BLD-MCNC: 11.2 G/DL (ref 11.5–15.4)
INR PPP: 1.22 (ref 0.86–1.16)
LYMPHOCYTES # BLD AUTO: 0.67 THOUSANDS/ΜL (ref 0.6–4.47)
LYMPHOCYTES NFR BLD AUTO: 28 % (ref 14–44)
MAGNESIUM SERPL-MCNC: 1.9 MG/DL (ref 1.6–2.6)
MCH RBC QN AUTO: 37.5 PG (ref 26.8–34.3)
MCHC RBC AUTO-ENTMCNC: 33.5 G/DL (ref 31.4–37.4)
MCV RBC AUTO: 112 FL (ref 82–98)
MONOCYTES # BLD AUTO: 0.22 THOUSAND/ΜL (ref 0.17–1.22)
MONOCYTES NFR BLD AUTO: 9 % (ref 4–12)
NEUTROPHILS # BLD AUTO: 1.48 THOUSANDS/ΜL (ref 1.85–7.62)
NEUTS SEG NFR BLD AUTO: 63 % (ref 43–75)
NT-PROBNP SERPL-MCNC: 163 PG/ML
PLATELET # BLD AUTO: 97 THOUSANDS/UL (ref 149–390)
PMV BLD AUTO: 9.9 FL (ref 8.9–12.7)
POTASSIUM SERPL-SCNC: 4.8 MMOL/L (ref 3.5–5.3)
PROT SERPL-MCNC: 5.5 G/DL (ref 6.4–8.2)
PROTHROMBIN TIME: 15.8 SECONDS (ref 12.1–14.4)
RBC # BLD AUTO: 2.99 MILLION/UL (ref 3.81–5.12)
SODIUM SERPL-SCNC: 137 MMOL/L (ref 136–145)
TROPONIN I SERPL-MCNC: <0.02 NG/ML
WBC # BLD AUTO: 2.39 THOUSAND/UL (ref 4.31–10.16)

## 2018-05-07 PROCEDURE — 84484 ASSAY OF TROPONIN QUANT: CPT | Performed by: EMERGENCY MEDICINE

## 2018-05-07 PROCEDURE — 0JQH3ZZ REPAIR LEFT LOWER ARM SUBCUTANEOUS TISSUE AND FASCIA, PERCUTANEOUS APPROACH: ICD-10-PCS | Performed by: EMERGENCY MEDICINE

## 2018-05-07 PROCEDURE — 99223 1ST HOSP IP/OBS HIGH 75: CPT | Performed by: INTERNAL MEDICINE

## 2018-05-07 PROCEDURE — 93970 EXTREMITY STUDY: CPT

## 2018-05-07 PROCEDURE — 93005 ELECTROCARDIOGRAM TRACING: CPT

## 2018-05-07 PROCEDURE — 80053 COMPREHEN METABOLIC PANEL: CPT | Performed by: EMERGENCY MEDICINE

## 2018-05-07 PROCEDURE — 36415 COLL VENOUS BLD VENIPUNCTURE: CPT | Performed by: EMERGENCY MEDICINE

## 2018-05-07 PROCEDURE — 85025 COMPLETE CBC W/AUTO DIFF WBC: CPT | Performed by: EMERGENCY MEDICINE

## 2018-05-07 PROCEDURE — 73502 X-RAY EXAM HIP UNI 2-3 VIEWS: CPT

## 2018-05-07 PROCEDURE — 85610 PROTHROMBIN TIME: CPT | Performed by: EMERGENCY MEDICINE

## 2018-05-07 PROCEDURE — 82948 REAGENT STRIP/BLOOD GLUCOSE: CPT

## 2018-05-07 PROCEDURE — 70450 CT HEAD/BRAIN W/O DYE: CPT

## 2018-05-07 PROCEDURE — 99285 EMERGENCY DEPT VISIT HI MDM: CPT

## 2018-05-07 PROCEDURE — 83880 ASSAY OF NATRIURETIC PEPTIDE: CPT | Performed by: EMERGENCY MEDICINE

## 2018-05-07 PROCEDURE — 85730 THROMBOPLASTIN TIME PARTIAL: CPT | Performed by: EMERGENCY MEDICINE

## 2018-05-07 PROCEDURE — 83735 ASSAY OF MAGNESIUM: CPT | Performed by: EMERGENCY MEDICINE

## 2018-05-07 PROCEDURE — 71046 X-RAY EXAM CHEST 2 VIEWS: CPT

## 2018-05-07 RX ORDER — ASPIRIN 81 MG/1
81 TABLET ORAL DAILY
Status: DISCONTINUED | OUTPATIENT
Start: 2018-05-08 | End: 2018-05-09 | Stop reason: HOSPADM

## 2018-05-07 RX ORDER — LIDOCAINE HYDROCHLORIDE AND EPINEPHRINE 10; 10 MG/ML; UG/ML
20 INJECTION, SOLUTION INFILTRATION; PERINEURAL ONCE
Status: COMPLETED | OUTPATIENT
Start: 2018-05-07 | End: 2018-05-07

## 2018-05-07 RX ORDER — INSULIN GLARGINE 100 [IU]/ML
15 INJECTION, SOLUTION SUBCUTANEOUS
Status: DISCONTINUED | OUTPATIENT
Start: 2018-05-07 | End: 2018-05-09 | Stop reason: HOSPADM

## 2018-05-07 RX ORDER — SODIUM CHLORIDE 9 MG/ML
75 INJECTION, SOLUTION INTRAVENOUS ONCE
Status: COMPLETED | OUTPATIENT
Start: 2018-05-07 | End: 2018-05-07

## 2018-05-07 RX ORDER — AMLODIPINE BESYLATE 2.5 MG/1
2.5 TABLET ORAL DAILY
Status: DISCONTINUED | OUTPATIENT
Start: 2018-05-08 | End: 2018-05-09 | Stop reason: HOSPADM

## 2018-05-07 RX ORDER — HEPARIN SODIUM 5000 [USP'U]/ML
5000 INJECTION, SOLUTION INTRAVENOUS; SUBCUTANEOUS EVERY 8 HOURS SCHEDULED
Status: DISCONTINUED | OUTPATIENT
Start: 2018-05-07 | End: 2018-05-09 | Stop reason: HOSPADM

## 2018-05-07 RX ORDER — GUAIFENESIN AND DEXTROMETHORPHAN HYDROBROMIDE 100; 10 MG/5ML; MG/5ML
5 SOLUTION ORAL AS NEEDED
COMMUNITY
End: 2018-05-22 | Stop reason: HOSPADM

## 2018-05-07 RX ORDER — ONDANSETRON 2 MG/ML
4 INJECTION INTRAMUSCULAR; INTRAVENOUS EVERY 6 HOURS PRN
Status: DISCONTINUED | OUTPATIENT
Start: 2018-05-07 | End: 2018-05-09 | Stop reason: HOSPADM

## 2018-05-07 RX ADMIN — INSULIN LISPRO 5 UNITS: 100 INJECTION, SOLUTION INTRAVENOUS; SUBCUTANEOUS at 22:23

## 2018-05-07 RX ADMIN — Medication 1 APPLICATION: at 15:23

## 2018-05-07 RX ADMIN — HEPARIN SODIUM 5000 UNITS: 5000 INJECTION, SOLUTION INTRAVENOUS; SUBCUTANEOUS at 22:23

## 2018-05-07 RX ADMIN — LIDOCAINE HYDROCHLORIDE AND EPINEPHRINE 20 ML: 10; 10 INJECTION, SOLUTION INFILTRATION; PERINEURAL at 16:21

## 2018-05-07 RX ADMIN — SODIUM CHLORIDE 75 ML/HR: 0.9 INJECTION, SOLUTION INTRAVENOUS at 22:47

## 2018-05-07 RX ADMIN — INSULIN GLARGINE 15 UNITS: 100 INJECTION, SOLUTION SUBCUTANEOUS at 22:23

## 2018-05-07 NOTE — ASSESSMENT & PLAN NOTE
· Not POA, had episode of loss of consciousness at [facility] facility  DDx at this time includes arrhythmia or more likely mechanical fall from overall weakness and frailty  Exam only focal for leg swelling and long standing fibrotic appearing chest x-ray  Perhaps slightly dry   EKG normal  Presently patient feels fine, however ambulatory trial with nursing revealed weakness with patient's gait   · Admit patient to med/surg under inpatient status   · Monitor on telemetry for 24 hours   · Check echo   · Hydrate NSS 75 cc/hr once   · PT, OT consult

## 2018-05-07 NOTE — ASSESSMENT & PLAN NOTE
· BG well controlled on admission   · Continue home insulin regimen   · Lantus 15 U QHS  · Humalog SSI   · QID accuchecks

## 2018-05-07 NOTE — PLAN OF CARE
Problem: Potential for Falls  Goal: Patient will remain free of falls  INTERVENTIONS:  - Assess patient frequently for physical needs  -  Identify cognitive and physical deficits and behaviors that affect risk of falls    -  [place] fall precautions as indicated by assessment   - Educate patient/family on patient safety including physical limitations  - Instruct patient to call for assistance with activity based on assessment  - Modify environment to reduce risk of injury  - Consider OT/PT consult to assist with strengthening/mobility   Outcome: Progressing

## 2018-05-07 NOTE — H&P
[address] [facility]  H&P- Adriane  7/9/1927, 80 y o  female MRN: [id number]    Unit/Bed#: -[facility] Encounter: [id number]    Primary Care Provider: You Craft MD   Date and time admitted to hospital: 5/7/2018  2:23 PM        * Syncope   Assessment & Plan    · Not POA, had episode of loss of consciousness at [facility] facility  DDx at this time includes arrhythmia or more likely mechanical fall from overall weakness and frailty  Exam only focal for leg swelling and long standing fibrotic appearing chest x-ray  Perhaps slightly dry  EKG normal  Presently patient feels fine, however ambulatory trial with nursing revealed weakness with patient's gait   · Admit patient to med/surg under inpatient status   · Monitor on telemetry for 24 hours   · Check echo   · Hydrate NSS 75 cc/hr once   · PT, OT consult          Bilateral lower extremity edema   Assessment & Plan    · POA, symmetrical edema roughly 2-3+ bilaterally  History of hypercoagulability given cancer history  Albumin noted to be low  Legs are non-tender to palpation  · Check venous doppler   · Compression stockings  · Elevate extremity         Type 2 diabetes mellitus without complication, with long-term current use of insulin (HCC)   Assessment & Plan    · BG well controlled on admission   · Continue home insulin regimen   · Lantus 15 U QHS  · Humalog SSI   · QID accuchecks         Pancytopenia (HCC)   Assessment & Plan    · POA, at baseline   · Monitor CBC        Metastatic breast cancer (HCC)   Assessment & Plan    · Stable, on Ibrance   · Continue         CKD (chronic kidney disease)   Assessment & Plan    · Creatinine baseline appears to be 1 9-2 0  · Monitor   · Will give gentle fluids           VTE Prophylaxis: Heparin  / sequential compression device   Code Status: Full Code   POLST: POLST form is not discussed and not completed at this time    Discussion with family: Discussed with family at bedside     Anticipated Length of Stay:  Patient will be admitted on an Inpatient basis with an anticipated length of stay of  greater than 2 midnights  Justification for Hospital Stay: Needs to rule out cardiac pathology, PT/OT consultation needed     Total Time for Visit, including Counseling / Coordination of Care: 1 hour  Greater than 50% of this total time spent on direct patient counseling and coordination of care  Chief Complaint:   Fall    History of Present Illness:    Gerardo Medina is a 80 y o  female with a history of metastatic breast cancer, T2DM on insulin, and HTN who presents with a fall  Patient reports that she was getting up from bed after lunch today and was walking to sharron her walker when she "went out"  The patient was aware that she had fallen and she is unsure if she actually lost consciousness  She states that the only symptom that she had before falling was that her visual fields had closed in  She denied any chest pain, palpitations, shortness of breath, dizziness, blurred vision, numbness, tingling, or weakness in her arms or legs  She denies voiding her bowels or bladder  Denies recalling biting her tongue  She denies history of recent falls  Denies history of cardiac arrhythmias  Of note the patient's family noted that her legs were more swollen today  She reports that her legs are not painful and do not feel swollen  She reports that they feel no heavier to  to move at all  She denies shortness of breath, orthopnea, or PND  Denies history of blood clots, but does have history of metastatic cancer  Review of Systems:    Review of Systems   Constitutional: Negative for appetite change, chills, diaphoresis, fatigue and fever  HENT: Negative for congestion, rhinorrhea and sore throat  Eyes: Positive for visual disturbance (resolved)  Respiratory: Negative for cough, chest tightness, shortness of breath and wheezing  Cardiovascular: Positive for leg swelling   Negative for chest pain and palpitations  Gastrointestinal: Negative for abdominal pain, constipation, diarrhea, nausea and vomiting  Genitourinary: Negative for dysuria  Musculoskeletal: Positive for gait problem  Negative for arthralgias and myalgias  Neurological: Negative for dizziness, syncope, weakness, light-headedness, numbness and headaches  All other systems reviewed and are negative  Past Medical and Surgical History:     Past Medical History:   Diagnosis Date    Anemia     [facility] metastases ([facility] )     Breast cancer ([place] [address] )     Diabetes mellitus ([place] [address] )     Hemochromatosis     Hypertension     Liver metastases ([place] [address] )        No past surgical history on file  Meds/Allergies:    Prior to Admission medications    Medication Sig Start Date End Date Taking? Authorizing Provider   bismuth subsalicylate (PEPTO BISMOL) 524 mg/30 mL oral suspension Take 30 mL by mouth as needed for indigestion   Yes Historical Provider, MD   dextromethorphan-guaiFENesin (ROBAFEN DM)  mg/5 mL oral syrup Take 5 mL by mouth as needed   Yes Historical Provider, MD   glucose 4 g chewable tablet Chew as needed for low blood sugar   Yes Historical Provider, MD   insulin degludec (TRESIBA FLEXTOUCH) injection pen 100 units/mL Inject 15 Units under the skin daily at bedtime   Yes Historical Provider, MD   amLODIPine (NORVASC) 2 5 mg tablet Take 2 5 mg by mouth daily      Historical Provider, MD   aspirin (ECOTRIN LOW STRENGTH) 81 mg EC tablet Take 81 mg by mouth daily    Historical Provider, MD   Calcium Carbonate-Vitamin D3 (CALCIUM 600/VITAMIN D) 600-400 MG-UNIT TABS Take 1 tablet by mouth daily    Historical Provider, MD   cyanocobalamin (VITAMIN B-12) 1,000 mcg tablet Take 1,000 mcg by mouth daily    Historical Provider, MD   insulin aspart (NovoLOG) 100 units/mL injection Inject under the skin 3 (three) times a day before meals Sliding scale     Historical Provider, MD   palbociclib (IBRANCE) 100 MG capsule Take 1 capsule (100 mg total) by mouth daily x 21 days then off for 7 days 5/5/18   Vu Camp MD   insulin glargine (LANTUS) 100 units/mL subcutaneous injection Inject 15 Units under the skin daily at bedtime 3/20/18 5/7/18  Ruby Herrera MD     I have reviewed home medications with patient personally  Allergies: No Known Allergies    Social History:     Marital Status:    Occupation: None  Patient Pre-hospital Living Situation: [facility]   Patient Pre-hospital Level of Mobility: Walker  Patient Pre-hospital Diet Restrictions: None  Substance Use History:   History   Alcohol Use No     History   Smoking Status    Never Smoker   Smokeless Tobacco    Never Used     History   Drug Use No       Family History:    No family history on file  Physical Exam:     Vitals:   Blood Pressure: 138/61 (05/07/18 1750)  Pulse: 100 (05/07/18 1750)  Temperature: 97 5 °F (36 4 °C) (05/07/18 1750)  Temp Source: Oral (05/07/18 1750)  Respirations: 18 (05/07/18 1750)  Height: 5' 5" (165 1 cm) (05/07/18 1750)  Weight - Scale: 80 2 kg (176 lb 12 9 oz) (05/07/18 1750)  SpO2: 99 % (05/07/18 1750)    Physical Exam   Constitutional: She is oriented to person, place, and time  Vital signs are normal  She appears well-developed and well-nourished  Non-toxic appearance  No distress  HENT:   Head: Normocephalic and atraumatic  Mouth/Throat: Mucous membranes are dry  Eyes: Conjunctivae and EOM are normal  Pupils are equal, round, and reactive to light  Neck: Neck supple  Cardiovascular: Normal rate, regular rhythm, S1 normal, S2 normal, normal heart sounds and intact distal pulses  Exam reveals no S3 and no S4  No murmur heard  2-3+ pretibial edema noted bilaterally   Pulmonary/Chest: Effort normal  No accessory muscle usage  No respiratory distress  She has no decreased breath sounds  She has no wheezes  She has no rhonchi  She has rales in the right lower field and the left lower field  She exhibits no tenderness  Abdominal: Soft  Bowel sounds are normal  She exhibits no distension and no mass  There is no tenderness  There is no rigidity, no rebound and no guarding  Neurological: She is alert and oriented to person, place, and time  She has normal strength  She is not disoriented  She displays no tremor  No cranial nerve deficit or sensory deficit  She displays no seizure activity  GCS eye subscore is 4  GCS verbal subscore is 5  GCS motor subscore is 6  Skin: Skin is warm and dry  Additional Data:     Lab Results: I have personally reviewed pertinent reports  Results from last 7 days  Lab Units 05/07/18  1507   WBC Thousand/uL 2 39*   HEMOGLOBIN g/dL 11 2*   HEMATOCRIT % 33 4*   PLATELETS Thousands/uL 97*   NEUTROS PCT % 63   LYMPHS PCT % 28   MONOS PCT % 9   EOS PCT % 0       Results from last 7 days  Lab Units 05/07/18  1507   SODIUM mmol/L 137   POTASSIUM mmol/L 4 8   CHLORIDE mmol/L 104   CO2 mmol/L 24   BUN mg/dL 39*   CREATININE mg/dL 2 13*   CALCIUM mg/dL 9 7   TOTAL PROTEIN g/dL 5 5*   BILIRUBIN TOTAL mg/dL 1 80*   ALK PHOS U/L 275*   ALT U/L 86*   AST U/L 122*   GLUCOSE RANDOM mg/dL 198*       Results from last 7 days  Lab Units 05/07/18  1507   INR  1 22*               Imaging: I have personally reviewed pertinent reports  XR hip/pelv 2-3 vws left   ED Interpretation by Harry Ramírez DO (05/07 1646)   No fracture      Final Result by Barber Gillette MD (05/07 1738)      No acute osseous abnormality  Workstation performed: [id number]         XR chest 2 views   ED Interpretation by Harry Ramírez DO (05/07 1646)   Vascular congestion      Final Result by Alla Adam MD (05/07 1730)      1  Chronic, but progressively increasing basilar interstitial process  Differential considerations interstitial fibrotic disease such as IPF, lymphangitic carcinomatosis, or recurrent edema  2   No pneumonic consolidation  3   Scattered skeletal metastases        Workstation performed: [id number]         CT head without contrast   Final Result by Alla Adam MD (05/07 5885)   Addendum 1 of 1 by Alla Adam MD (05/07 5446)   ADDENDUM:      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 were not mentioned in the    initial report but are present  Patient has known metastatic breast    cancer  Final      No acute intracranial abnormality or significant change from prior  Workstation performed: [id number]         VAS lower limb venous duplex study, complete bilateral    (Results Pending)       EKG, Pathology, and Other Studies Reviewed on Admission:   · EKG: NSR, 94 BPM  · CXR: Chronic, but progressively increasing basilar interstitial process  No pneumonic consolidation   Scattered skeletal metastasis   · CT head: No change from prior   · XR Hip/pelvis: No fracture     Allscripts / Epic Records Reviewed: Yes     ** Please Note: This note has been constructed using a voice recognition system   **

## 2018-05-07 NOTE — ED PROVIDER NOTES
History  Chief Complaint   Patient presents with    Fall     Patient states she fell onto carpet floor  Denies LOC, states that she has left hip pain and left elbow laceration  55-year-old female presents to the emergency department after having a syncopal episode in her independent living apartment  Patient states that she was attempting to get up to ambulate to the bathroom when she fell to the ground  Patient denies tripping over anything  She does not recall the exact details of the event  She denies feeling lightheaded or dizzy  No palpitations or chest pain  Patient presents with bilateral pitting edema and states that the fluid in her legs accumulated over the past few weeks  She has been feeling very short of breath with ambulation  Patient is diabetic but reports that her blood sugars have been in the normal range  She presents with a left elbow laceration and left hip pain  She denies headache or neck pain  Patient's daughter and son-in-law are at the bedside reports the patient is very stoic and often not ask for help when she needs it  They were unaware that her legs were swollen  History provided by:  Patient, medical records and relative  Other   Location:  Syncope at home (independent living)  Quality:  Does not recall tripping, thinks she may have passed out  Severity:  Unable to specify  Onset quality:  Sudden  Timing:  Unable to specify  Progression:  Unable to specify  Chronicity:  New  Context:  Has noticed increased edema  Relieved by:  Nothing  Worsened by:  Nothing  Associated symptoms: shortness of breath    Associated symptoms: no chest pain, no cough and no vomiting        Prior to Admission Medications   Prescriptions Last Dose Informant Patient Reported? Taking?    Calcium Carbonate-Vitamin D3 (CALCIUM 600/VITAMIN D) 600-400 MG-UNIT TABS  Child Yes No   Sig: Take 1 tablet by mouth daily   amLODIPine (NORVASC) 2 5 mg tablet  Child Yes No   Sig: Take 2 5 mg by mouth daily    aspirin (ECOTRIN LOW STRENGTH) 81 mg EC tablet   Yes No   Sig: Take 81 mg by mouth daily   bismuth subsalicylate (PEPTO BISMOL) 524 mg/30 mL oral suspension   Yes Yes   Sig: Take 30 mL by mouth as needed for indigestion   cyanocobalamin (VITAMIN B-12) 1,000 mcg tablet   Yes No   Sig: Take 1,000 mcg by mouth daily   dextromethorphan-guaiFENesin (ROBAFEN DM)  mg/5 mL oral syrup   Yes Yes   Sig: Take 5 mL by mouth as needed   glucose 4 g chewable tablet   Yes Yes   Sig: Chew as needed for low blood sugar   insulin aspart (NovoLOG) 100 units/mL injection  Child Yes No   Sig: Inject under the skin 3 (three) times a day before meals Sliding scale    insulin degludec (TRESIBA FLEXTOUCH) injection pen 100 units/mL   Yes Yes   Sig: Inject 15 Units under the skin daily at bedtime   palbociclib (IBRANCE) 100 MG capsule   No No   Sig: Take 1 capsule (100 mg total) by mouth daily x 21 days then off for 7 days      Facility-Administered Medications: None       Past Medical History:   Diagnosis Date    Anemia     Bone metastases (HCC)     [facility]     Diabetes mellitus ([facility]     Hemochromatosis     Hypertension     Liver metastases ([facility] )        No past surgical history on file  No family history on file  I have reviewed and agree with the history as documented  Social History   Substance Use Topics    Smoking status: Never Smoker    Smokeless tobacco: Never Used    Alcohol use No        Review of Systems   Constitutional: Negative for appetite change  Respiratory: Positive for shortness of breath  Negative for cough  Cardiovascular: Positive for leg swelling  Negative for chest pain and palpitations  Gastrointestinal: Negative for vomiting  Musculoskeletal: Negative for back pain  Neurological: Positive for syncope and weakness         Physical Exam  ED Triage Vitals [05/07/18 1425]   Temperature Pulse Respirations Blood Pressure SpO2   97 7 °F (36 5 °C) 99 16 118/58 99 % Temp Source Heart Rate Source Patient Position - Orthostatic VS BP Location FiO2 (%)   Oral Monitor Lying Right arm --      Pain Score       No Pain           Orthostatic Vital Signs  Vitals:    05/07/18 1750 05/07/18 2241 05/08/18 0822 05/08/18 1500   BP: 138/61 122/66 124/69 118/69   Pulse: 100 100 79 97   Patient Position - Orthostatic VS: Lying Lying Lying Sitting       Physical Exam   Constitutional: She is oriented to person, place, and time  She appears well-developed and well-nourished  HENT:   Head: Normocephalic and atraumatic  Nose: Nose normal    Mouth/Throat: Oropharynx is clear and moist  No oropharyngeal exudate  Eyes: Conjunctivae and EOM are normal  Pupils are equal, round, and reactive to light  Neck: Normal range of motion  Neck supple  Cardiovascular: Normal rate, regular rhythm, normal heart sounds and intact distal pulses  Pulmonary/Chest: Effort normal  She has rales  Abdominal: Soft  Bowel sounds are normal  She exhibits no distension  There is no tenderness  There is no rebound and no guarding  Musculoskeletal: Normal range of motion  She exhibits edema (3+ pitting b/l)  She exhibits no deformity  Left elbow: She exhibits laceration  She exhibits normal range of motion, no swelling and no deformity  No tenderness found  Left hip: She exhibits tenderness and bony tenderness  She exhibits no swelling and no crepitus  Lymphadenopathy:     She has no cervical adenopathy  Neurological: She is alert and oriented to person, place, and time  She has normal strength and normal reflexes  No cranial nerve deficit or sensory deficit  She exhibits normal muscle tone  Coordination and gait normal    Skin: Skin is warm and dry  Laceration noted  No rash noted  Psychiatric: She has a normal mood and affect  Her behavior is normal  Judgment and thought content normal    Nursing note and vitals reviewed        ED Medications  Medications   amLODIPine (NORVASC) tablet 2 5 mg (2 5 mg Oral Given 5/8/18 0825)   aspirin (ECOTRIN LOW STRENGTH) EC tablet 81 mg (81 mg Oral Given 5/8/18 0825)   palbociclib (IBRANCE) capsule 100 mg (100 mg Oral Not Given 5/8/18 0809)   ondansetron (ZOFRAN) injection 4 mg (not administered)   heparin (porcine) subcutaneous injection 5,000 Units (5,000 Units Subcutaneous Given 5/8/18 1343)   insulin glargine (LANTUS) subcutaneous injection 15 Units 0 15 mL (15 Units Subcutaneous Given 5/7/18 2223)   insulin lispro (HumaLOG) 100 units/mL subcutaneous injection 1-6 Units (4 Units Subcutaneous Given 5/8/18 1605)   insulin lispro (HumaLOG) 100 units/mL subcutaneous injection 1-6 Units (5 Units Subcutaneous Given 5/7/18 2223)   LET gel 1 application (1 application Topical Given 5/7/18 1523)   lidocaine-epinephrine (XYLOCAINE/EPINEPHRINE) 1 %-1:100,000 injection 20 mL (20 mL Infiltration Given by Other 5/7/18 1621)   sodium chloride 0 9 % infusion (75 mL/hr Intravenous New Bag 5/7/18 2247)       Diagnostic Studies  Results Reviewed     Procedure Component Value Units Date/Time    B-type natriuretic peptide [[id number]]  (Normal) Collected:  05/07/18 1507    Lab Status:  Final result Specimen:  Blood from Arm, Left Updated:  05/07/18 1612     NT-proBNP 163 pg/mL     Magnesium [[id number]]  (Normal) Collected:  05/07/18 1507    Lab Status:  Final result Specimen:  Blood from Arm, Left Updated:  05/07/18 1612     Magnesium 1 9 mg/dL     Comprehensive metabolic panel [[id number]]  (Abnormal) Collected:  05/07/18 1507    Lab Status:  Final result Specimen:  Blood from Arm, Left Updated:  05/07/18 1610     Sodium 137 mmol/L      Potassium 4 8 mmol/L      Chloride 104 mmol/L      CO2 24 mmol/L      Anion Gap 9 mmol/L      BUN 39 (H) mg/dL      Creatinine 2 13 (H) mg/dL      Glucose 198 (H) mg/dL      Calcium 9 7 mg/dL       (H) U/L      ALT 86 (H) U/L      Alkaline Phosphatase 275 (H) U/L      Total Protein 5 5 (L) g/dL      Albumin 2 1 (L) g/dL      Total Bilirubin 1 80 (H) mg/dL      eGFR 20 ml/min/1 73sq m     Narrative:         National Kidney Disease Education Program recommendations are as follows:  GFR calculation is accurate only with a steady state creatinine  Chronic Kidney disease less than 60 ml/min/1 73 sq  meters  Kidney failure less than 15 ml/min/1 73 sq  meters      Protime-INR [[id number]]  (Abnormal) Collected:  05/07/18 1507    Lab Status:  Final result Specimen:  Blood from Arm, Left Updated:  05/07/18 1551     Protime 15 8 (H) seconds      INR 1 22 (H)    APTT [[id number]]  (Normal) Collected:  05/07/18 1507    Lab Status:  Final result Specimen:  Blood from Arm, Left Updated:  05/07/18 1551     PTT 29 seconds     CBC and differential [[id number]]  (Abnormal) Collected:  05/07/18 1507    Lab Status:  Final result Specimen:  Blood from Arm, Left Updated:  05/07/18 1550     WBC 2 39 (L) Thousand/uL      RBC 2 99 (L) Million/uL      Hemoglobin 11 2 (L) g/dL      Hematocrit 33 4 (L) %       (H) fL      MCH 37 5 (H) pg      MCHC 33 5 g/dL      RDW 15 8 (H) %      MPV 9 9 fL      Platelets 97 (L) Thousands/uL      Neutrophils Relative 63 %      Lymphocytes Relative 28 %      Monocytes Relative 9 %      Eosinophils Relative 0 %      Basophils Relative 0 %      Neutrophils Absolute 1 48 (L) Thousands/µL      Lymphocytes Absolute 0 67 Thousands/µL      Monocytes Absolute 0 22 Thousand/µL      Eosinophils Absolute 0 01 Thousand/µL      Basophils Absolute 0 01 Thousands/µL     Troponin I [[id number]]  (Normal) Collected:  05/07/18 1507    Lab Status:  Final result Specimen:  Blood from Arm, Left Updated:  05/07/18 1538     Troponin I <0 02 ng/mL     Narrative:         [employer] Chemistry analyzer 99% cutoff is > 0 04 ng/mL in network labs    o cTnI 99% cutoff is useful only when applied to patients in the clinical setting of myocardial ischemia  o cTnI 99% cutoff should be interpreted in the context of clinical history, ECG findings and possibly cardiac imaging to establish correct diagnosis  o cTnI 99% cutoff may be suggestive but clearly not indicative of a coronary event without the clinical setting of myocardial ischemia  UA w Reflex to Microscopic w Reflex to Culture [[id number]]     Lab Status:  No result Specimen:  Urine                  VAS lower limb venous duplex study, complete bilateral   Final Result by Rebeca Guerrero DO (05/08 1954)      XR hip/pelv 2-3 vws left   ED Interpretation by Reji Beltran DO (05/07 1646)   No fracture      Final Result by Heri Quiroz MD (05/07 1738)      No acute osseous abnormality  Workstation performed: [id number]         XR chest 2 views   ED Interpretation by Reji Beltran DO (05/07 1646)   Vascular congestion      Final Result by Erasmo Roper MD (05/07 7200)      1  Chronic, but progressively increasing basilar interstitial process  Differential considerations interstitial fibrotic disease such as IPF, lymphangitic carcinomatosis, or recurrent edema  2   No pneumonic consolidation  3   Scattered skeletal metastases  Workstation performed: [id number]         CT head without contrast   Final Result by Erasmo Roper MD (05/07 1450)   Addendum 1 of 1 by Erasmo Roper MD (05/07 5976)   ADDENDUM:      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 were not mentioned in the    initial report but are present  Patient has known metastatic breast    cancer  Final      No acute intracranial abnormality or significant change from prior                    Workstation performed: [id number]                    Procedures  Lac Repair  Date/Time: 5/7/2018 4:02 PM  Performed by: ISABEL WARREN  Authorized by: ISABEL WARREN   Consent given by: patient  Body area: upper extremity  Location details: left elbow  Laceration length: 3 cm  Foreign bodies: no foreign bodies  Nerve involvement: none  Anesthesia: local infiltration    Anesthesia:  Local Anesthetic: lidocaine 1% with epinephrine  Anesthetic total: 3 mL    Wound Dehiscence:  Superficial Wound Dehiscence: simple closure      Procedure Details:  Preparation: Patient was prepped and draped in the usual sterile fashion    Irrigation solution: saline  Irrigation method: syringe  Amount of cleaning: standard  Debridement: minimal  Degree of undermining: none  Skin closure: Steri-Strips  Subcutaneous closure: 5-0 Vicryl  Number of sutures: 3  Technique: simple  Approximation: close  Approximation difficulty: simple  Dressing: gauze roll  Patient tolerance: Patient tolerated the procedure well with no immediate complications  Comments: Skin tear closed with Steri-Strips subcutaneous layer closed with 3 5-0 Vicryl    ECG 12 Lead Documentation  Date/Time: 5/7/2018 4:05 PM  Performed by: ISABEL WARREN  Authorized by: ISABEL WARREN     Indications / Diagnosis:  Edema  ECG reviewed by me, the ED Provider: yes    Patient location:  ED  Interpretation:     Interpretation: normal    Rate:     ECG rate:  94    ECG rate assessment: normal    Rhythm:     Rhythm: sinus rhythm    Ectopy:     Ectopy: none    QRS:     QRS axis:  Normal  Comments:      Low voltage           Phone Contacts  ECG 12 Lead Documentation  Date/Time: 5/7/2018 4:05 PM  Performed by: ISABEL WARREN  Authorized by: ISABEL WARREN     Indications / Diagnosis:  Edema  ECG reviewed by me, the ED Provider: yes    Patient location:  ED  Interpretation:     Interpretation: normal    Rate:     ECG rate:  94    ECG rate assessment: normal    Rhythm:     Rhythm: sinus rhythm    Ectopy:     Ectopy: none    QRS:     QRS axis:  Normal  Comments:      Low voltage  Lac Repair  Date/Time: 5/7/2018 4:02 PM  Performed by: ISABEL WARREN  Authorized by: ISABEL WARREN   Consent given by: patient  Body area: upper extremity  Location details: left elbow  Laceration length: 3 cm  Foreign bodies: no foreign bodies  Nerve involvement: none  Anesthesia: local infiltration    Anesthesia:  Local Anesthetic: lidocaine 1% with epinephrine  Anesthetic total: 3 mL    Wound Dehiscence:  Superficial Wound Dehiscence: simple closure      Procedure Details:  Preparation: Patient was prepped and draped in the usual sterile fashion    Irrigation solution: saline  Irrigation method: syringe  Amount of cleaning: standard  Debridement: minimal  Degree of undermining: none  Skin closure: Steri-Strips  Subcutaneous closure: 5-0 Vicryl  Number of sutures: 3  Technique: simple  Approximation: close  Approximation difficulty: simple  Dressing: gauze roll  Patient tolerance: Patient tolerated the procedure well with no immediate complications  Comments: Skin tear closed with Steri-Strips subcutaneous layer closed with 3 5-0 Vicryl          ED Course                               MDM  Number of Diagnoses or Management Options  Bilateral lower extremity edema: new and requires workup  Elbow laceration, left, initial encounter: new and requires workup  Metastatic breast cancer ([facility] ): established and worsening  Pancytopenia ([facility] ): new and requires workup  Syncope: new and requires workup     Amount and/or Complexity of Data Reviewed  Clinical lab tests: ordered and reviewed  Tests in the radiology section of CPT®: ordered and reviewed  Tests in the medicine section of CPT®: ordered and reviewed  Decide to obtain previous medical records or to obtain history from someone other than the patient: yes  Discuss the patient with other providers: yes  Independent visualization of images, tracings, or specimens: yes    Patient Progress  Patient progress: stable    CritCare Time    Disposition  Final diagnoses:   Syncope   Elbow laceration, left, initial encounter   Pancytopenia ([facility] )   Bilateral lower extremity edema   Metastatic breast cancer ([facility] )     Time reflects when diagnosis was documented in both MDM as applicable and the Disposition within this note     Time User Action Codes Description Comment    5/7/2018  4:53 PM Elodia Du Add [R55] Syncope     5/7/2018  4:53 PM Elodia Du Add [S51 012A] Elbow laceration, left, initial encounter     5/7/2018  4:53 PM Elodia Du Add [D61 818] Pancytopenia ([address] )     5/7/2018  4:54 PM Elodia Du Add [R60 0] Bilateral lower extremity edema     5/8/2018  6:39 PM Elodia Du Add [C50 919] Metastatic breast cancer [facility])       ED Disposition     ED Disposition Condition Comment    Admit  Case was discussed with Dr Matt Selby and the patient's admission status was agreed to be Admission Status: inpatient status to the service of Dr Matt Selby MD Documentation      Most Recent Value   Accepting Facility Name, [address] [place] [address] Road Name, [address] [place] [address]      Follow-up Information    None       Current Discharge Medication List      CONTINUE these medications which have NOT CHANGED    Details   bismuth subsalicylate (PEPTO BISMOL) 524 mg/30 mL oral suspension Take 30 mL by mouth as needed for indigestion      dextromethorphan-guaiFENesin (ROBAFEN DM)  mg/5 mL oral syrup Take 5 mL by mouth as needed      glucose 4 g chewable tablet Chew as needed for low blood sugar      insulin degludec (TRESIBA FLEXTOUCH) injection pen 100 units/mL Inject 15 Units under the skin daily at bedtime      amLODIPine (NORVASC) 2 5 mg tablet Take 2 5 mg by mouth daily        aspirin (ECOTRIN LOW STRENGTH) 81 mg EC tablet Take 81 mg by mouth daily      Calcium Carbonate-Vitamin D3 (CALCIUM 600/VITAMIN D) 600-400 MG-UNIT TABS Take 1 tablet by mouth daily      cyanocobalamin (VITAMIN B-12) 1,000 mcg tablet Take 1,000 mcg by mouth daily      insulin aspart (NovoLOG) 100 units/mL injection Inject under the skin 3 (three) times a day before meals Sliding scale       palbociclib (IBRANCE) 100 MG capsule Take 1 capsule (100 mg total) by mouth daily x 21 days then off for 7 days  Qty: 21 capsule, Refills: 1    Associated Diagnoses: Malignant neoplasm of central portion of right breast in female, estrogen receptor positive [facility]           No discharge procedures on file      ED Provider  Electronically Signed by           Kyler Banuelos DO  05/08/18 7014

## 2018-05-07 NOTE — ASSESSMENT & PLAN NOTE
· POA, symmetrical edema roughly 2-3+ bilaterally  History of hypercoagulability given cancer history  Albumin noted to be low  Legs are non-tender to palpation     · Check venous doppler   · Compression stockings  · Elevate extremity

## 2018-05-08 LAB
ANION GAP SERPL CALCULATED.3IONS-SCNC: 5 MMOL/L (ref 4–13)
ATRIAL RATE: 94 BPM
BUN SERPL-MCNC: 41 MG/DL (ref 5–25)
CALCIUM SERPL-MCNC: 9.4 MG/DL (ref 8.3–10.1)
CHLORIDE SERPL-SCNC: 107 MMOL/L (ref 100–108)
CO2 SERPL-SCNC: 27 MMOL/L (ref 21–32)
CREAT SERPL-MCNC: 2.12 MG/DL (ref 0.6–1.3)
ERYTHROCYTE [DISTWIDTH] IN BLOOD BY AUTOMATED COUNT: 15.5 % (ref 11.6–15.1)
GFR SERPL CREATININE-BSD FRML MDRD: 20 ML/MIN/1.73SQ M
GLUCOSE SERPL-MCNC: 175 MG/DL (ref 65–140)
GLUCOSE SERPL-MCNC: 182 MG/DL (ref 65–140)
GLUCOSE SERPL-MCNC: 218 MG/DL (ref 65–140)
GLUCOSE SERPL-MCNC: 313 MG/DL (ref 65–140)
GLUCOSE SERPL-MCNC: 320 MG/DL (ref 65–140)
HCT VFR BLD AUTO: 27.6 % (ref 34.8–46.1)
HGB BLD-MCNC: 9.3 G/DL (ref 11.5–15.4)
MCH RBC QN AUTO: 37.3 PG (ref 26.8–34.3)
MCHC RBC AUTO-ENTMCNC: 33.7 G/DL (ref 31.4–37.4)
MCV RBC AUTO: 111 FL (ref 82–98)
P AXIS: 38 DEGREES
PLATELET # BLD AUTO: 80 THOUSANDS/UL (ref 149–390)
PMV BLD AUTO: 9.8 FL (ref 8.9–12.7)
POTASSIUM SERPL-SCNC: 4.6 MMOL/L (ref 3.5–5.3)
PR INTERVAL: 150 MS
QRS AXIS: -8 DEGREES
QRSD INTERVAL: 74 MS
QT INTERVAL: 336 MS
QTC INTERVAL: 420 MS
RBC # BLD AUTO: 2.49 MILLION/UL (ref 3.81–5.12)
SODIUM SERPL-SCNC: 139 MMOL/L (ref 136–145)
T WAVE AXIS: 11 DEGREES
VENTRICULAR RATE: 94 BPM
WBC # BLD AUTO: 2.06 THOUSAND/UL (ref 4.31–10.16)

## 2018-05-08 PROCEDURE — 82948 REAGENT STRIP/BLOOD GLUCOSE: CPT

## 2018-05-08 PROCEDURE — 93010 ELECTROCARDIOGRAM REPORT: CPT | Performed by: INTERNAL MEDICINE

## 2018-05-08 PROCEDURE — 80048 BASIC METABOLIC PNL TOTAL CA: CPT | Performed by: PHYSICIAN ASSISTANT

## 2018-05-08 PROCEDURE — 99232 SBSQ HOSP IP/OBS MODERATE 35: CPT | Performed by: HOSPITALIST

## 2018-05-08 PROCEDURE — 93970 EXTREMITY STUDY: CPT | Performed by: SURGERY

## 2018-05-08 PROCEDURE — 85027 COMPLETE CBC AUTOMATED: CPT | Performed by: PHYSICIAN ASSISTANT

## 2018-05-08 RX ADMIN — HEPARIN SODIUM 5000 UNITS: 5000 INJECTION, SOLUTION INTRAVENOUS; SUBCUTANEOUS at 21:49

## 2018-05-08 RX ADMIN — HEPARIN SODIUM 5000 UNITS: 5000 INJECTION, SOLUTION INTRAVENOUS; SUBCUTANEOUS at 05:35

## 2018-05-08 RX ADMIN — INSULIN LISPRO 4 UNITS: 100 INJECTION, SOLUTION INTRAVENOUS; SUBCUTANEOUS at 16:05

## 2018-05-08 RX ADMIN — INSULIN GLARGINE 15 UNITS: 100 INJECTION, SOLUTION SUBCUTANEOUS at 21:49

## 2018-05-08 RX ADMIN — AMLODIPINE BESYLATE 2.5 MG: 2.5 TABLET ORAL at 08:25

## 2018-05-08 RX ADMIN — HEPARIN SODIUM 5000 UNITS: 5000 INJECTION, SOLUTION INTRAVENOUS; SUBCUTANEOUS at 13:43

## 2018-05-08 RX ADMIN — INSULIN LISPRO 5 UNITS: 100 INJECTION, SOLUTION INTRAVENOUS; SUBCUTANEOUS at 11:44

## 2018-05-08 RX ADMIN — INSULIN LISPRO 1 UNITS: 100 INJECTION, SOLUTION INTRAVENOUS; SUBCUTANEOUS at 08:25

## 2018-05-08 RX ADMIN — INSULIN LISPRO 2 UNITS: 100 INJECTION, SOLUTION INTRAVENOUS; SUBCUTANEOUS at 21:49

## 2018-05-08 RX ADMIN — ASPIRIN 81 MG: 81 TABLET, COATED ORAL at 08:25

## 2018-05-08 NOTE — PLAN OF CARE
Problem: DISCHARGE PLANNING - CARE MANAGEMENT  Goal: Discharge to post-acute care or home with appropriate resources  INTERVENTIONS:  - Conduct assessment to determine patient/family and health care team treatment goals, and need for post-acute services based on payer coverage, community resources, and patient preferences, and barriers to discharge  - Address psychosocial, clinical, and financial barriers to discharge as identified in assessment in conjunction with the patient/family and health care team  - Arrange appropriate level of post-acute services according to patients   needs and preference and payer coverage in collaboration with the physician and health care team  - Communicate with and update the patient/family, physician, and health care team regarding progress on the discharge plan  - Arrange appropriate transportation to post-acute venues  Outcome: Progressing  LOS 1  Patient is not a readmission and is a bundle  Bundle notice discussed with patient and signed       CM met with patient to discuss DCP  Patient lives at [facility]  She uses a walker for ambulation and is independent with most ADLs, WE provided meals and cleaning services  She denies prior VNA or STR history  Medications are provided by WE and pt denies difficulty affording medications  Patient states she does have a AD/POA, copy requested  Her emergency contact is Carvin Ganser, her daughter   She has SSI and is transported to appointment by her daughter      Patient would like home PT services, will provide script upon return to [address]      CM reviewed the bundle program with patient and the following: "As part of your Medicare benefit, you are automatically enrolled in the bundle payment program  (Here is a notice from Medicare that you may keep and read)   The program is designed to assist in coordinating your care after you leave the hospital  A nurse from [facility] will be following you for 90 days   Please anticipate a phone call from the nurse within 48hrs of your discharge   As your Care Manager, I will be providing a handoff to your next level of care to ensure a safe transition "

## 2018-05-08 NOTE — ASSESSMENT & PLAN NOTE
· POA, symmetrical edema roughly 2-3+ bilaterally on admission  History of hypercoagulability given cancer history  Albumin noted to be low  Legs are non-tender to palpation     · venous doppler--> no e/o DVT   · Compression stockings   · Elevate extremity

## 2018-05-08 NOTE — ASSESSMENT & PLAN NOTE
· Not POA, had episode of loss of consciousness at [facility] facility  DDx at this time includes arrhythmia or more likely mechanical fall from overall weakness and frailty  Exam only focal for leg swelling and long standing fibrotic appearing chest x-ray  Perhaps slightly dry   EKG normal  Ambulatory trial with nursing revealed weakness with patient's gait   · Monitor on telemetry for 24 hours   · Check echo; pending   · Hydrate NSS 75 cc/hr overnight   · PT, OT consult

## 2018-05-08 NOTE — ASSESSMENT & PLAN NOTE
· POA, at baseline   Likely chronic due to recent chemotherapy treatment d/t metastatic breast ca   · Monitor CBC

## 2018-05-08 NOTE — CASE MANAGEMENT
Initial Clinical Review    Admission: Date/Time/Statement: 5/7/18 @ 1655     Orders Placed This Encounter   Procedures    Inpatient Admission (expected length of stay for this patient is greater than two midnights)     Standing Status:   Standing     Number of Occurrences:   1     Order Specific Question:   Admitting Physician     Answer:   Kelly Caldwell     Order Specific Question:   Level of Care     Answer:   Med Surg [16]     Order Specific Question:   Estimated length of stay     Answer:   More than 2 Midnights     Order Specific Question:   Certification     Answer:   I certify that inpatient services are medically necessary for this patient for a duration of greater than two midnights  See H&P and MD Progress Notes for additional information about the patient's course of treatment  ED: Date/Time/Mode of Arrival:   ED Arrival Information     Expected Arrival Acuity Means of Arrival Escorted By Service Admission Type    - 5/7/2018 14:22 Urgent Ambulance [facility] Urgent    Arrival Complaint    fall hip pain left          Chief Complaint:   Chief Complaint   Patient presents with    Fall     Patient states she fell onto carpet floor  Denies LOC, states that she has left hip pain and left elbow laceration  History of Illness: 80 y o  female with a history of metastatic breast cancer, T2DM on insulin, and HTN who presents with a fall  Patient reports that she was getting up from bed after lunch today and was walking to sharron her walker when she "went out"  The patient was aware that she had fallen and she is unsure if she actually lost consciousness  She states that the only symptom that she had before falling was that her visual fields had closed in  She denied any chest pain, palpitations, shortness of breath, dizziness, blurred vision, numbness, tingling, or weakness in her arms or legs  She denies voiding her bowels or bladder   Denies recalling biting her tongue  She denies history of recent falls  Denies history of cardiac arrhythmias       Of note the patient's family noted that her legs were more swollen today  ED Vital Signs:   ED Triage Vitals [05/07/18 1425]   Temperature Pulse Respirations Blood Pressure SpO2   97 7 °F (36 5 °C) 99 16 118/58 99 %      Temp Source Heart Rate Source Patient Position - Orthostatic VS BP Location FiO2 (%)   Oral Monitor Lying Right arm --      Pain Score       No Pain        Wt Readings from Last 1 Encounters:   05/07/18 80 2 kg (176 lb 12 9 oz)       Vital Signs (abnormal):  Sustained pulse 99 - 100     Abnormal Labs/Diagnostic Test Results:   Bun 39  Creatinine 2 13  Glucose 198   ast 122  Alt 86  Alkaline phosphatse 275  Total protein 5 5  Albumin 2 1  Total bilirubin 1 80  INR 1 22  Wbc 2 39  hgb 11 2, hct 33 4  Platelets 97    CxR- Chronic, but progressively increasing basilar interstitial process   Differential considerations interstitial fibrotic disease such as IPF, lymphangitic carcinomatosis, or recurrent edema  2   No pneumonic consolidation  3   Scattered skeletal metastases  2100 glucose 303     Labs 5/8/2018-  Bun 41  Creatinine 2 12  Glucose 175  Wbc 2 06, hgb 9 3, hct 27 6  Platelets 80    ED Treatment:   Medication Administration from 05/07/2018 1422 to 05/07/2018 1739       Date/Time Order Dose Route Action Action by Comments     05/07/2018 3887 LET gel 1 application 1 application Topical Given Juan Gaffney RN      05/07/2018 1621 lidocaine-epinephrine (XYLOCAINE/EPINEPHRINE) 1 %-1:100,000 injection 20 mL 20 mL Infiltration Given by Other Juan Gaffney RN Given by Dr Megan Gant          Past Medical/Surgical History:    Active Ambulatory Problems     Diagnosis Date Noted    Cancer, metastatic to lung ([facility] [address] [facility] ) 03/09/2016    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[facility] [address] ) 03/09/2016    [facility] 06/08/2017    Bone metastasis ([facility] [facility] ) 03/15/2018    Metastatic breast cancer ([place] [address] ) 03/18/2018    CKD (chronic kidney disease) 03/18/2018    Altered mental status 03/18/2018    Hypoglycemia 03/18/2018     Resolved Ambulatory Problems     Diagnosis Date Noted    No Resolved Ambulatory Problems     Past Medical History:   Diagnosis Date    Anemia     Bone metastases ([place] [address] )     Breast cancer ([place] [address] )     Diabetes mellitus ([place] [address] )     Hemochromatosis     Hypertension     Liver metastases ([place] [address] )        Admitting Diagnosis: Syncope [R55]  Hip pain, left [M25 552]  Pancytopenia ([place] [address] ) [D61 818]  Bilateral lower extremity edema [R60 0]  Elbow laceration, left, initial encounter [S51 012A]    Age/Sex: 80 y o  female    Assessment/Plan:   Syncope   Assessment & Plan     · Not POA, had episode of loss of consciousness at [facility] facility  DDx at this time includes arrhythmia or more likely mechanical fall from overall weakness and frailty  Exam only focal for leg swelling and long standing fibrotic appearing chest x-ray  Perhaps slightly dry  EKG normal  Presently patient feels fine, however ambulatory trial with nursing revealed weakness with patient's gait   ? Admit patient to med/surg under inpatient status   ? Monitor on telemetry for 24 hours   ? Check echo   ? Hydrate NSS 75 cc/hr once   ? PT, OT consult            Bilateral lower extremity edema   Assessment & Plan     · POA, symmetrical edema roughly 2-3+ bilaterally  History of hypercoagulability given cancer history  Albumin noted to be low  Legs are non-tender to palpation  ? Check venous doppler   ? Compression stockings  ? Elevate extremity           Type 2 diabetes mellitus without complication, with long-term current use of insulin (HCC)   Assessment & Plan     · BG well controlled on admission   ? Continue home insulin regimen   § Lantus 15 U QHS  § Humalog SSI   ? QID accuchecks           Pancytopenia (HCC)   Assessment & Plan     · POA, at baseline   ?  Monitor CBC          Metastatic breast cancer ([place] [address] ) Assessment & Plan     · Stable, on Ibrance   ? Continue           CKD (chronic kidney disease)   Assessment & Plan     · Creatinine baseline appears to be 1 9-2 0  ? Monitor   ? Will give gentle fluids          Admission Orders:  5/7/2018  1655 INPATIENT   Scheduled Meds:   Current Facility-Administered Medications:  amLODIPine 2 5 mg Oral Daily Richard Morgan PA-C   aspirin 81 mg Oral Daily Richard Pederson PA-C   heparin (porcine) 5,000 Units Subcutaneous [facility] Richard Pederson PA-C   insulin glargine 15 Units Subcutaneous HS Richard Pederson PA-C   insulin lispro 1-6 Units Subcutaneous TID AC Richard Pederson PA-C   insulin lispro 1-6 Units Subcutaneous HS Richard Pederson PA-C   ondansetron 4 mg Intravenous Q6H PRN Reina Manzano PA-C   palbociclib 100 mg Oral Daily Richard Pederson PA-C     Continuous Infusions:    PRN Meds: ondansetron - not used       OTHER ORDERS: fingerstick glucose qid    scds  Knee high compression stockings  Telemetry  Echo  PT/OT

## 2018-05-08 NOTE — ASSESSMENT & PLAN NOTE
· Not POA, had episode of loss of consciousness at [facility] facility  DDx at this time includes arrhythmia or more likely mechanical fall from overall weakness and frailty  Exam only focal for leg swelling and long standing fibrotic appearing chest x-ray  Perhaps slightly dry   EKG normal  Ambulatory trial with nursing revealed weakness with patient's gait   · Monitor on telemetry for 24 hours   · Check echo; pending   · Hydrate NSS 75 cc/hr overnight   · PT, OT consult

## 2018-05-08 NOTE — SOCIAL WORK
LOS 1  Patient is not a readmission and is a bundle  Bundle notice discussed with patient and signed  CM met with patient to discuss DCP  Patient lives at [facility] [place]  She uses a walker for ambulation and is independent with most ADLs, WE provided meals and cleaning services  She denies prior VNA or STR history  Medications are provided by WE and pt denies difficulty affording medications  Patient states she does have a AD/POA, copy requested  Her emergency contact is Augsutine Bhatt, her daughter  She has SSI and is transported to appointment by her daughter  Patient would like home PT services, will provide script upon return to [address] [facility]  CM reviewed the bundle program with patient and the following: "As part of your Medicare benefit, you are automatically enrolled in the bundle payment program  (Here is a notice from Medicare that you may keep and read)   The program is designed to assist in coordinating your care after you leave the hospital  A nurse from [facility] will be following you for 90 days  Please anticipate a phone call from the nurse within 48hrs of your discharge    As your Care Manager, I will be providing a handoff to your next level of care to ensure a safe transition "

## 2018-05-08 NOTE — PROGRESS NOTES
Progress Note - Yasir Jordan 7/9/1927, 80 y o  female MRN: [id number]    Unit/Bed#: -01 Encounter: [id number]    Primary Care Provider: Aramnd Mayer MD   Date and time admitted to hospital: 5/7/2018  2:23 PM    * Syncope   Assessment & Plan    · Not POA, had episode of loss of consciousness at [facility] facility  DDx at this time includes arrhythmia or more likely mechanical fall from overall weakness and frailty  Exam only focal for leg swelling and long standing fibrotic appearing chest x-ray  Perhaps slightly dry  EKG normal  Ambulatory trial with nursing revealed weakness with patient's gait   · Monitor on telemetry for 24 hours   · Check echo; pending   · Hydrate NSS 75 cc/hr overnight   · PT, OT consult          Bilateral lower extremity edema   Assessment & Plan    · POA, symmetrical edema roughly 2-3+ bilaterally on admission  History of hypercoagulability given cancer history  Albumin noted to be low  Legs are non-tender to palpation  · venous doppler--> no e/o DVT   · Compression stockings  · Elevate extremity         CKD (chronic kidney disease)   Assessment & Plan    · Creatinine baseline appears to be 1 9-2 0  · Monitor; stable   · Will give gentle fluids         Metastatic breast cancer (HCC)   Assessment & Plan    · Stable, on Ibrance   · Continue         Pancytopenia (HCC)   Assessment & Plan    · POA, at baseline  Likely chronic due to recent chemotherapy treatment d/t metastatic breast ca   · Monitor CBC            VTE Pharmacologic Prophylaxis:   Pharmacologic: Heparin  Mechanical VTE Prophylaxis in Place: Yes    Patient Centered Rounds: I have performed bedside rounds with nursing staff today  Discussions with Specialists or Other Care Team Provider: none     Education and Discussions with Family / Patient: patient; called dtr left message     Time Spent for Care: 30 minutes    More than 50% of total time spent on counseling and coordination of care as described above  Current Length of Stay: 1 day(s)    Current Patient Status: Inpatient   Certification Statement: The patient will continue to require additional inpatient hospital stay due to evaluation of generalized weakness after fall at home     Discharge Plan / Estimated Discharge Date: TBD based on clinical course; pending work-up    Code Status: Level 1 - Full Code    Subjective:   Pt feels well  No complaints at present  No c/o dizzy or lightheadedness     Objective:     Vitals:   Temp (24hrs), Av 7 °F (36 5 °C), Min:97 5 °F (36 4 °C), Max:98 1 °F (36 7 °C)    HR:  [] 79  Resp:  [16-18] 18  BP: (112-138)/(58-86) 124/69  SpO2:  [96 %-99 %] 96 %  Body mass index is 29 42 kg/m²  Input and Output Summary (last 24 hours): Intake/Output Summary (Last 24 hours) at 18 1136  Last data filed at 18 1030   Gross per 24 hour   Intake              220 ml   Output                0 ml   Net              220 ml       Physical Exam:     Physical Exam   Constitutional: She is oriented to person, place, and time  No distress  HENT:   Head: Normocephalic and atraumatic  Cardiovascular: Normal rate and regular rhythm  Pulmonary/Chest: Effort normal and breath sounds normal  No respiratory distress  She has no wheezes  Abdominal: Soft  Bowel sounds are normal  She exhibits no distension  There is no tenderness  There is no rebound  Musculoskeletal: She exhibits edema (2-3+ edema )  She exhibits no tenderness  Neurological: She is alert and oriented to person, place, and time  No cranial nerve deficit  Coordination normal    Skin: Skin is warm  She is not diaphoretic  Psychiatric: She has a normal mood and affect  Her behavior is normal    Nursing note and vitals reviewed      Additional Data:     Labs:      Results from last 7 days  Lab Units 18  0556 18  1507   WBC Thousand/uL 2 06* 2 39*   HEMOGLOBIN g/dL 9 3* 11 2*   HEMATOCRIT % 27 6* 33 4*   PLATELETS Thousands/uL 80* 97*   NEUTROS PCT %  --  63   LYMPHS PCT %  --  28   MONOS PCT %  --  9   EOS PCT %  --  0       Results from last 7 days  Lab Units 05/08/18  0556 05/07/18  1507   SODIUM mmol/L 139 137   POTASSIUM mmol/L 4 6 4 8   CHLORIDE mmol/L 107 104   CO2 mmol/L 27 24   BUN mg/dL 41* 39*   CREATININE mg/dL 2 12* 2 13*   CALCIUM mg/dL 9 4 9 7   TOTAL PROTEIN g/dL  --  5 5*   BILIRUBIN TOTAL mg/dL  --  1 80*   ALK PHOS U/L  --  275*   ALT U/L  --  86*   AST U/L  --  122*   GLUCOSE RANDOM mg/dL 175* 198*       Results from last 7 days  Lab Units 05/07/18  1507   INR  1 22*       * I Have Reviewed All Lab Data Listed Above  * Additional Pertinent Lab Tests Reviewed: All Labs Within Last 24 Hours Reviewed    Imaging:    Imaging Reports Reviewed Today Include:   Imaging Personally Reviewed by Myself Includes:      Recent Cultures (last 7 days):           Last 24 Hours Medication List:     Current Facility-Administered Medications:  amLODIPine 2 5 mg Oral Daily Richard Pederson PA-C   aspirin 81 mg Oral Daily Richard Pederson PA-C   heparin (porcine) 5,000 Units Subcutaneous Q8H [address] Richard Pederson PA-C   insulin glargine 15 Units Subcutaneous HS Richard Pederson PA-C   insulin lispro 1-6 Units Subcutaneous TID AC Richard Pederson PA-C   insulin lispro 1-6 Units Subcutaneous HS Richard Pederson PA-C   ondansetron 4 mg Intravenous Q6H PRN Monico Betancur PA-C   palbociclib 100 mg Oral Daily Richard Patino PA-C        Today, Patient Was Seen By: Azul Otero MD    ** Please Note: This note has been constructed using a voice recognition system   **

## 2018-05-09 ENCOUNTER — APPOINTMENT (INPATIENT)
Dept: NON INVASIVE DIAGNOSTICS | Facility: HOSPITAL | Age: 83
DRG: 947 | End: 2018-05-09
Payer: MEDICARE

## 2018-05-09 VITALS
HEART RATE: 83 BPM | RESPIRATION RATE: 18 BRPM | SYSTOLIC BLOOD PRESSURE: 120 MMHG | BODY MASS INDEX: 29.46 KG/M2 | DIASTOLIC BLOOD PRESSURE: 57 MMHG | OXYGEN SATURATION: 97 % | TEMPERATURE: 97.7 F | WEIGHT: 176.81 LBS | HEIGHT: 65 IN

## 2018-05-09 LAB
ANION GAP SERPL CALCULATED.3IONS-SCNC: 5 MMOL/L (ref 4–13)
ANISOCYTOSIS BLD QL SMEAR: PRESENT
BASOPHILS # BLD MANUAL: 0 THOUSAND/UL (ref 0–0.1)
BASOPHILS NFR MAR MANUAL: 0 % (ref 0–1)
BUN SERPL-MCNC: 38 MG/DL (ref 5–25)
CALCIUM SERPL-MCNC: 9.2 MG/DL (ref 8.3–10.1)
CHLORIDE SERPL-SCNC: 105 MMOL/L (ref 100–108)
CO2 SERPL-SCNC: 27 MMOL/L (ref 21–32)
CREAT SERPL-MCNC: 2.11 MG/DL (ref 0.6–1.3)
EOSINOPHIL # BLD MANUAL: 0 THOUSAND/UL (ref 0–0.4)
EOSINOPHIL NFR BLD MANUAL: 0 % (ref 0–6)
ERYTHROCYTE [DISTWIDTH] IN BLOOD BY AUTOMATED COUNT: 15.6 % (ref 11.6–15.1)
GFR SERPL CREATININE-BSD FRML MDRD: 20 ML/MIN/1.73SQ M
GLUCOSE SERPL-MCNC: 137 MG/DL (ref 65–140)
GLUCOSE SERPL-MCNC: 160 MG/DL (ref 65–140)
GLUCOSE SERPL-MCNC: 182 MG/DL (ref 65–140)
HCT VFR BLD AUTO: 27.9 % (ref 34.8–46.1)
HGB BLD-MCNC: 9.3 G/DL (ref 11.5–15.4)
LYMPHOCYTES # BLD AUTO: 0.95 THOUSAND/UL (ref 0.6–4.47)
LYMPHOCYTES # BLD AUTO: 45 % (ref 14–44)
MCH RBC QN AUTO: 37.5 PG (ref 26.8–34.3)
MCHC RBC AUTO-ENTMCNC: 33.3 G/DL (ref 31.4–37.4)
MCV RBC AUTO: 113 FL (ref 82–98)
MONOCYTES # BLD AUTO: 0.11 THOUSAND/UL (ref 0–1.22)
MONOCYTES NFR BLD: 5 % (ref 4–12)
NEUTROPHILS # BLD MANUAL: 1.01 THOUSAND/UL (ref 1.85–7.62)
NEUTS BAND NFR BLD MANUAL: 4 % (ref 0–8)
NEUTS SEG NFR BLD AUTO: 44 % (ref 43–75)
PLATELET # BLD AUTO: 72 THOUSANDS/UL (ref 149–390)
PLATELET BLD QL SMEAR: ABNORMAL
PMV BLD AUTO: 10.6 FL (ref 8.9–12.7)
POTASSIUM SERPL-SCNC: 4.6 MMOL/L (ref 3.5–5.3)
RBC # BLD AUTO: 2.48 MILLION/UL (ref 3.81–5.12)
SODIUM SERPL-SCNC: 137 MMOL/L (ref 136–145)
TOTAL CELLS COUNTED SPEC: 100
VARIANT LYMPHS # BLD AUTO: 2 %
WBC # BLD AUTO: 2.1 THOUSAND/UL (ref 4.31–10.16)

## 2018-05-09 PROCEDURE — 99239 HOSP IP/OBS DSCHRG MGMT >30: CPT | Performed by: HOSPITALIST

## 2018-05-09 PROCEDURE — G8979 MOBILITY GOAL STATUS: HCPCS

## 2018-05-09 PROCEDURE — G8978 MOBILITY CURRENT STATUS: HCPCS

## 2018-05-09 PROCEDURE — 82948 REAGENT STRIP/BLOOD GLUCOSE: CPT

## 2018-05-09 PROCEDURE — 97110 THERAPEUTIC EXERCISES: CPT

## 2018-05-09 PROCEDURE — 97163 PT EVAL HIGH COMPLEX 45 MIN: CPT

## 2018-05-09 PROCEDURE — 85007 BL SMEAR W/DIFF WBC COUNT: CPT | Performed by: HOSPITALIST

## 2018-05-09 PROCEDURE — 93306 TTE W/DOPPLER COMPLETE: CPT

## 2018-05-09 PROCEDURE — 85027 COMPLETE CBC AUTOMATED: CPT | Performed by: HOSPITALIST

## 2018-05-09 PROCEDURE — 93306 TTE W/DOPPLER COMPLETE: CPT | Performed by: INTERNAL MEDICINE

## 2018-05-09 PROCEDURE — 80048 BASIC METABOLIC PNL TOTAL CA: CPT | Performed by: HOSPITALIST

## 2018-05-09 RX ADMIN — ASPIRIN 81 MG: 81 TABLET, COATED ORAL at 08:33

## 2018-05-09 RX ADMIN — INSULIN LISPRO 1 UNITS: 100 INJECTION, SOLUTION INTRAVENOUS; SUBCUTANEOUS at 08:34

## 2018-05-09 RX ADMIN — AMLODIPINE BESYLATE 2.5 MG: 2.5 TABLET ORAL at 08:33

## 2018-05-09 RX ADMIN — HEPARIN SODIUM 5000 UNITS: 5000 INJECTION, SOLUTION INTRAVENOUS; SUBCUTANEOUS at 05:52

## 2018-05-09 RX ADMIN — INSULIN LISPRO 1 UNITS: 100 INJECTION, SOLUTION INTRAVENOUS; SUBCUTANEOUS at 12:36

## 2018-05-09 RX ADMIN — HEPARIN SODIUM 5000 UNITS: 5000 INJECTION, SOLUTION INTRAVENOUS; SUBCUTANEOUS at 12:37

## 2018-05-09 NOTE — PLAN OF CARE
Problem: PHYSICAL THERAPY ADULT  Goal: Performs mobility at highest level of function for planned discharge setting  See evaluation for individualized goals  Treatment/Interventions: Functional transfer training, LE strengthening/ROM, Therapeutic exercise, Endurance training, Cognitive reorientation, Patient/family training, Equipment eval/education, Bed mobility, Gait training  Equipment Recommended: Other (Comment) (roller walker)       See flowsheet documentation for full assessment, interventions and recommendations  Prognosis: Fair  Problem List: Decreased strength, Decreased range of motion, Decreased endurance, Impaired balance, Decreased mobility, Decreased coordination, Decreased safety awareness  Assessment: Pt presents with a fall  Patient reports that she was getting up from bed after lunch today and was walking to sharron her walker when she "went out"  Pt was aware that she had fallen and she is unsure if she actually lost consciousness  Dx: syncope and B LE edema  order placed for PT eval and tx, w/ activity order of up w/ A  pt presents w/ comorbidities of metastatic breast cancer, DM, anemia, and HTN and personal factors of ambulating w/ assistive device, decreased cognition, positive fall history and advanced age  pt presents w/ weakness, decreased ROM, decreased endurance, impaired cognition, impaired balance, gait deviations, impaired coordination, decreased safety awareness, fall risk and LE edema  these impairments are evident in findings from physical examination (weakness, decreased ROM, impaired coordination and LE edema), mobility assessment (need for standby assist w/ all phases of mobility when usually mobilizing independently, tolerance to only 120 feet of ambulation, need for cuing for mobility and breathing technique), and Barthel Index: 70/100  pt needed input for task focus and mobility technique/safety  pt is at risk for falls due to physical and safety awareness deficits   Fall risk is also evident in completion time of Timed Up and Go (using 14 seconds as fall risk cut off value)  pt's clinical presentation is unstable/unpredictable (evident in poor blood sugar control, need for assist w/ all phases of mobility when usually mobilizing independently, tolerance to only 120 feet of ambulation and need for input for task focus and mobility technique)  pt needs inpatient PT tx to improve mobility deficits  discharge recommendation is for Home PT to reduce fall risk and maximize level of functional independence  pt would benefit from OT consult safety awareness  Recommendation: Home PT, OT consult          See flowsheet documentation for full assessment

## 2018-05-09 NOTE — DISCHARGE INSTRUCTIONS
You were hospitalized due to a fall  You were cared for on the 4th floor under the service of Cate Pepper [facility] who covers for Jo Bojorquez MD while you were hospitalized  If you have any questions or concerns related to this hospitalization, you may contact us at [phone]  For follow up, we recommend that you follow up with your primary care physician  We also provide the following instructions / recommendations at discharge:     · Be sure to keep your legs elevated at night to prevent swelling from occurring  · You may need compression stockings during the day to help prevent swelling as well

## 2018-05-09 NOTE — PHYSICAL THERAPY NOTE
PHYSICAL THERAPY TREATMENT NOTE    Patient Name: Darby VARGAS Date: 5/9/2018 05/09/18 1148   Pain Assessment   Pain Assessment No/denies pain   Restrictions/Precautions   Other Precautions Chair Alarm; Bed Alarm; Fall Risk;Telemetry   General   Chart Reviewed Yes   Family/Caregiver Present No   Cognition   Arousal/Participation Cooperative   Attention Attends with cues to redirect   Orientation Level Oriented to person;Disoriented to place; Disoriented to time  (pt was identified w/ full name and birth date)   Following Commands Follows one step commands with increased time or repetition   Subjective   Subjective pt agreed to PT intervention  Activity Tolerance   Activity Tolerance Patient limited by fatigue   Nurse Made Aware spoke to Roxy Christian CM   Equipment Use   Comments ankle pumps 20  quad sets and glut sets 15 each  heel slides, hip abduction, and short arc quads 10 each  Assessment   Prognosis Fair   Problem List Decreased strength;Decreased range of motion;Decreased endurance; Impaired balance;Decreased mobility; Decreased coordination;Decreased safety awareness   Assessment Therapist introduced LE exercises to address physical and mobility deficits noted during eval  Pt had fair understanding of exercise technique after initial introduction and demonstration  Occasional rest breaks were needed due to fatigue  Handout was provided to facilitate understanding of technique and independence w/ performing program  continued inpatient PT is indicated to facilitate return to independent level of mobility and function  Goals   Patient Goals go home   STG Expiration Date 05/19/18   Treatment Day 1   Plan   Treatment/Interventions Functional transfer training;LE strengthening/ROM; Therapeutic exercise; Endurance training;Cognitive reorientation;Patient/family training;Equipment eval/education; Bed mobility;Gait training   Progress Progressing toward goals   PT Frequency 5x/wk   Recommendation   Recommendation Home PT;OT consult   Equipment Recommended Other (Comment)  (roller walker)     Skilled inpatient PT recommended while in hospital to progress pt toward treatment goals      Rodríguez Tamayo, PT

## 2018-05-09 NOTE — DISCHARGE SUMMARY
Discharge- Frank Dock 7/9/1927, 80 y o  female MRN: [id number]    Unit/Bed#: -01 Encounter: [id number]    Primary Care Provider: Natalee Lynn MD   Date and time admitted to hospital: 5/7/2018  2:23 PM    * Syncope   Assessment & Plan    · Not POA, had episode of loss of consciousness at [facility] facility  DDx at this time includes arrhythmia or more likely mechanical fall from overall weakness and frailty  Exam only focal for leg swelling and long standing fibrotic appearing chest x-ray  Perhaps slightly dry  EKG normal  Ambulatory trial with nursing revealed weakness with patient's gait   · Monitor on telemetry for 24 hours   · Check echo; pending   · Hydrate NSS 75 cc/hr overnight   · PT, OT consult          Bilateral lower extremity edema   Assessment & Plan    · POA, symmetrical edema roughly 2-3+ bilaterally on admission  History of hypercoagulability given cancer history  Albumin noted to be low  Legs are non-tender to palpation  · venous doppler--> no e/o DVT   · Compression stockings   · Elevate extremity         CKD (chronic kidney disease)   Assessment & Plan    · Creatinine baseline appears to be 1 9-2 0  · Monitor; stable   · Will give gentle fluids         Metastatic breast cancer (HCC)   Assessment & Plan    · Stable, on Ibrance   · Continue         Pancytopenia (HCC)   Assessment & Plan    · POA, at baseline   Likely chronic due to recent chemotherapy treatment d/t metastatic breast ca   · Monitor CBC          Discharging Physician / Practitioner: Sean Boggs MD  PCP: Natalee Lynn MD  Admission Date:   Admission Orders     Ordered        05/07/18 1655  Inpatient Admission (expected length of stay for this patient is greater than two midnights)  Once             Discharge Date: 05/09/18    Resolved Problems  Date Reviewed: 5/7/2018    None        Consultations During Hospital Stay:  · none    Procedures Performed:     TTE: pending    LE duplex: Impression:  RIGHT LOWER LIMB:  No evidence of acute or chronic deep vein thrombosis  No evidence of superficial thrombophlebitis noted  Doppler evaluation shows a normal response to augmentation maneuvers  Popliteal, posterior tibial and anterior tibial arterial Doppler waveforms are  hyperemic  LEFT LOWER LIMB:  No evidence of acute or chronic deep vein thrombosis  No evidence of superficial thrombophlebitis noted  Doppler evaluation shows a normal response to augmentation maneuvers  Popliteal, posterior tibial and anterior tibial arterial Doppler waveforms are  hyperemic  Tech Note: Difficulty visualizing peroneal veins  Significant Findings / Test Results:     · None     Incidental Findings:   · None      Test Results Pending at Discharge (will require follow up):   · TTE: final read pending      Outpatient Tests Requested:  · none    Complications:  none    Reason for Admission: syncope and fall     Hospital Course:     Karen Castillo is a 80 y o  female patient independent with ADLs who originally presented to the hospital on 5/7/2018 due to a fall and question of syncope  Pt knew she fell but was not clear if she had LOC or not  Pt had no preceding syndrome prior to her fall, just felt weak  Family also noted increasing lower extremity swelling which was new for her  Pt was admitted for work-up  Monitored on telemetry for 24hours without event  LE were evalauted with US and no e/o DVT  TTE was performed but not read prior to discharge  Pt's LE edema improved with elevation and compression  Would continue conservative treatment of this issue at this time  Pt worked with PT who recommended home PT and OT consultation  Discussed plan with pt's dtr who was able to transport patient home  Please see above list of diagnoses and related plan for additional information  Condition at Discharge: fair     Discharge Day Visit / Exam:     Subjective:  Pt is feeling well  No complaints   Anxious to be discharged  Vitals: Blood Pressure: 120/57 (05/09/18 0700)  Pulse: 83 (05/09/18 0700)  Temperature: 97 7 °F (36 5 °C) (05/09/18 0700)  Temp Source: Oral (05/09/18 0700)  Respirations: 18 (05/09/18 0700)  Height: 5' 5" (165 1 cm) (05/07/18 1750)  Weight - Scale: 80 2 kg (176 lb 12 9 oz) (05/07/18 1750)  SpO2: 97 % (05/09/18 0700)  Exam:   Physical Exam   Constitutional: She is oriented to person, place, and time  She appears well-developed  No distress  HENT:   Head: Normocephalic and atraumatic  Cardiovascular: Normal rate and regular rhythm  No murmur heard  Pulmonary/Chest: Effort normal and breath sounds normal  No respiratory distress  She has no wheezes  Abdominal: Soft  Bowel sounds are normal  She exhibits no distension  There is no tenderness  Musculoskeletal: She exhibits edema (mild ankle edema b/l )  Neurological: She is alert and oriented to person, place, and time  No cranial nerve deficit  Skin: Skin is warm and dry  No rash noted  She is not diaphoretic  No erythema  Psychiatric: She has a normal mood and affect  Her behavior is normal    Nursing note and vitals reviewed  Discussion with Family: dtr, Carvin Ganser     Discharge instructions/Information to patient and family:   See after visit summary for information provided to patient and family  Provisions for Follow-Up Care:  See after visit summary for information related to follow-up care and any pertinent home health orders  Disposition:     Home with VNA Services (Reminder: Complete face to face encounter)    For Discharges to [facility]F:   ·     Planned Readmission: none     Discharge Statement:  I spent 40 minutes discharging the patient  This time was spent on the day of discharge  I had direct contact with the patient on the day of discharge   Greater than 50% of the total time was spent examining patient, answering all patient questions, arranging and discussing plan of care with patient as well as directly providing post-discharge instructions  Additional time then spent on discharge activities  Discharge Medications:  See after visit summary for reconciled discharge medications provided to patient and family        ** Please Note: This note has been constructed using a voice recognition system **

## 2018-05-09 NOTE — PHYSICAL THERAPY NOTE
PHYSICAL THERAPY EVALUATION NOTE    Patient Name: Cl Parsons  TYCAROLEEC'Y Date: 5/9/2018  AGE:   80 y o  Mrn:   [id number]  ADMIT DX:  Syncope [R55]  Hip pain, left [M25 552]  Pancytopenia ([facility] [address] ) [D61 818]  Bilateral lower extremity edema [R60 0]  Elbow laceration, left, initial encounter [S51 012A]    Past Medical History:   Diagnosis Date    Anemia     Bone metastases ([facility] )     Breast cancer ([facility] )     Diabetes mellitus ([facility] )     Hemochromatosis     Hypertension     Liver metastases ([facility] )      Length Of Stay: 2  PHYSICAL THERAPY EVALUATION :    05/09/18 1138   Pain Assessment   Pain Assessment No/denies pain   Home Living   Type of Home Assisted living  [facility])   Additional Comments ambulates w/ roller walker  independent w/ most ADLs  meals and cleaning provided  1 fall in last 6 months  Prior Function   Comments pt seen supine in bed  agreed to participate in PT eval  denied pain or dizziness  pt needed occasional input for task focus  pt wants to go home  Restrictions/Precautions   Other Precautions Chair Alarm; Bed Alarm; Fall Risk;Telemetry   General   Additional Pertinent History 5/8/18 at 11:20, glucose was 320  Family/Caregiver Present No   Cognition   Arousal/Participation Cooperative   Orientation Level Oriented to person;Disoriented to place; Disoriented to time  (pt was identified w/ full name and birth date)   Following Commands Follows one step commands with increased time or repetition   Comments mild nonpitting edema LEs   RUE Assessment   RUE Assessment WFL  (3+/5, shoulder 3/5)   LUE Assessment   LUE Assessment X  (3/5, shoulder 3-/5)   RLE Assessment   RLE Assessment WFL  (3+/5)   LLE Assessment   LLE Assessment WFL  (3+/5)   Coordination   Movements are Fluid and Coordinated 0   Coordination and Movement Description impaired coordination all extremities   Light Touch   RLE Light Touch Grossly intact   LLE Light Touch Grossly intact   Bed Mobility   Supine to Sit 5  Supervision   Additional items HOB elevated; Bedrails; Increased time required;Verbal cues  (for bed rail use)   Additional Comments room air resting pulse ox 97% and 94 BPM  supine blood pressure 136/61  Transfers   Sit to Stand 5  Supervision   Additional items Increased time required;Verbal cues  (for hand placement)   Stand to Sit 5  Supervision   Additional items Verbal cues  (for body positioning, hand placement)   Additional Comments timed up and go: 27 42 seconds   Ambulation/Elevation   Gait pattern Decreased foot clearance;Shuffling; Foward flexed; Excessively slow   Gait Assistance 5  Supervision  (w/ chair follow)   Additional items Verbal cues  (for walker placement, breathing technique)   Assistive Device Rolling walker   Distance 120, 60 feet w/ seated break x 3 minutes  (additional not possible due to fatigue)   Balance   Static Sitting Fair +   Dynamic Sitting Fair -   Static Standing Fair   Dynamic Standing Fair -   Ambulatory Fair -  (w/ roller walker)   Activity Tolerance   Activity Tolerance Patient limited by fatigue   Nurse Made Aware spoke to Roxy Christian CM   Assessment   Prognosis Fair   Problem List Decreased strength;Decreased range of motion;Decreased endurance; Impaired balance;Decreased mobility; Decreased coordination;Decreased safety awareness   Assessment Pt presents with a fall  Patient reports that she was getting up from bed after lunch today and was walking to sharron her walker when she "went out"  Pt was aware that she had fallen and she is unsure if she actually lost consciousness  Dx: syncope and B LE edema  order placed for PT eval and tx, w/ activity order of up w/ A  pt presents w/ comorbidities of metastatic breast cancer, DM, anemia, and HTN and personal factors of ambulating w/ assistive device, decreased cognition, positive fall history and advanced age   pt presents w/ weakness, decreased ROM, decreased endurance, impaired cognition, impaired balance, gait deviations, impaired coordination, decreased safety awareness, fall risk and LE edema  these impairments are evident in findings from physical examination (weakness, decreased ROM, impaired coordination and LE edema), mobility assessment (need for standby assist w/ all phases of mobility when usually mobilizing independently, tolerance to only 120 feet of ambulation, need for cuing for mobility and breathing technique), and Barthel Index: 70/100  pt needed input for task focus and mobility technique/safety  pt is at risk for falls due to physical and safety awareness deficits  Fall risk is also evident in completion time of Timed Up and Go (using 14 seconds as fall risk cut off value)  pt's clinical presentation is unstable/unpredictable (evident in poor blood sugar control, need for assist w/ all phases of mobility when usually mobilizing independently, tolerance to only 120 feet of ambulation and need for input for task focus and mobility technique)  pt needs inpatient PT tx to improve mobility deficits  discharge recommendation is for Home PT to reduce fall risk and maximize level of functional independence  pt would benefit from OT consult safety awareness  Goals   Patient Goals go home   STG Expiration Date 05/19/18   Short Term Goal #1 pt will:  Increase bilateral LE strength 1/2 grade to facilitate independent mobility, Perform all bed mobility tasks independently to decrease fall risk factors, Perform all transfers independently to improve independence, Ambulate 250 ft  with roller walker independently w/o LOB, Increase ambulatory balance 1 grade to decrease risk for falls, Complete exercise program independently, Tolerate 3 hr OOB to faciliate upright tolerance, Improve Barthel Index score to 85 or greater to facilitate independence and Complete TUG in 14 seconds or less to decrease risk for falls   Plan   Treatment/Interventions Functional transfer training;LE strengthening/ROM; Therapeutic exercise; Endurance training;Cognitive reorientation;Patient/family training;Equipment eval/education; Bed mobility;Gait training   PT Frequency 5x/wk   Recommendation   Recommendation Home PT;OT consult   Equipment Recommended Other (Comment)  (roller walker)   Barthel Index   Feeding 10   Bathing 0   Grooming Score 5   Dressing Score 5   Bladder Score 10   Bowels Score 10   Toilet Use Score 10   Transfers (Bed/Chair) Score 10   Mobility (Level Surface) Score 10   Stairs Score 0   Barthel Index Score 70     Skilled PT recommended while in hospital and upon DC to progress pt toward treatment goals       Sarah Pierce, PT

## 2018-05-09 NOTE — PLAN OF CARE
Problem: PHYSICAL THERAPY ADULT  Goal: Performs mobility at highest level of function for planned discharge setting  See evaluation for individualized goals  Treatment/Interventions: Functional transfer training, LE strengthening/ROM, Therapeutic exercise, Endurance training, Cognitive reorientation, Patient/family training, Equipment eval/education, Bed mobility, Gait training  Equipment Recommended: Other (Comment) (roller walker)       See flowsheet documentation for full assessment, interventions and recommendations  Outcome: Progressing  Prognosis: Fair  Problem List: Decreased strength, Decreased range of motion, Decreased endurance, Impaired balance, Decreased mobility, Decreased coordination, Decreased safety awareness  Assessment: Therapist introduced LE exercises to address physical and mobility deficits noted during eval  Pt had fair understanding of exercise technique after initial introduction and demonstration  Occasional rest breaks were needed due to fatigue  Handout was provided to facilitate understanding of technique and independence w/ performing program  continued inpatient PT is indicated to facilitate return to independent level of mobility and function  Recommendation: Home PT, OT consult          See flowsheet documentation for full assessment

## 2018-05-09 NOTE — PHYSICIAN ADVISOR
Current patient class: Inpatient  The patient is currently on Hospital Day: 3      The patient was admitted to the hospital at [phone] on 5/7/18 for the following diagnosis:  Syncope [R55]  Hip pain, left [M25 552]  Pancytopenia ([address] ) [D61 818]  Bilateral lower extremity edema [R60 0]  Elbow laceration, left, initial encounter [S51 012A]       There is documentation in the medical record of an expected length of stay of at least 2 midnights  The patient is therefore expected to satisfy the 2 midnight benchmark and given the 2 midnight presumption is appropriate for INPATIENT ADMISSION  Given this expectation of a satisfying stay, CMS instructs us that the patient is most often appropriate for inpatient admission under part A provided medical necessity is documented in the chart  After review of the relevant documentation, labs, vital signs and test results, the patient is appropriate for INPATIENT ADMISSION  Admission to the hospital as an inpatient is a complex decision making process which requires the practitioner to consider the patients presenting complaint, history and physical examination and all relevant testing  With this in mind, in this case, the patient was deemed appropriate for INPATIENT ADMISSION  After review of the documentation and testing available at the time of the admission I concur with this clinical determination of medical necessity  Rationale is as follows: The patient is a 80 yrs old Female who presented to the ED at 5/7/2018  2:23 PM with a chief complaint of Fall (Patient states she fell onto carpet floor  Denies LOC, states that she has left hip pain and left elbow laceration  )     The patient continues to remain hospitalized with syncope, lower extremity edema, chronic kidney disease, metastatic breast cancer      Given the need for further hospitalization, and along with the documentation of medical necessity present in the chart, the patient is appropriate for inpatient admission  The patient is expected to satisfy the 2 midnight benchmark, and will require further acute medical care  The patient does have comorbid conditions which increases the risk for significant adverse outcome  Given this the patient is appropriate for inpatient admission  The patients vitals on arrival were ED Triage Vitals [05/07/18 1425]   Temperature Pulse Respirations Blood Pressure SpO2   97 7 °F (36 5 °C) 99 16 118/58 99 %      Temp Source Heart Rate Source Patient Position - Orthostatic VS BP Location FiO2 (%)   Oral Monitor Lying Right arm --      Pain Score       No Pain           Past Medical History:   Diagnosis Date    Anemia     Bone metastases (HCC)     Breast cancer (HCC)     Diabetes mellitus ([facility] [address] )     Hemochromatosis     Hypertension     Liver metastases (HCC)      No past surgical history on file          Consults have been placed to:   None    Vitals:    05/07/18 2241 05/08/18 0822 05/08/18 1500 05/08/18 2250   BP: 122/66 124/69 118/69 140/65   BP Location: Right arm Right arm Right arm Right arm   Pulse: 100 79 97 98   Resp: 18 18 18 18   Temp: 97 5 °F (36 4 °C) 98 1 °F (36 7 °C) 97 8 °F (36 6 °C) 98 °F (36 7 °C)   TempSrc: Oral Oral Oral Oral   SpO2: 97% 96% 97% 97%   Weight:       Height:           Most recent labs:    Recent Labs      05/07/18   1507  05/08/18   0556   WBC  2 39*  2 06*   HGB  11 2*  9 3*   HCT  33 4*  27 6*   PLT  97*  80*   K  4 8  4 6   NA  137  139   CALCIUM  9 7  9 4   BUN  39*  41*   CREATININE  2 13*  2 12*   INR  1 22*   --    TROPONINI  <0 02   --    AST  122*   --    ALT  86*   --    ALKPHOS  275*   --    BILITOT  1 80*   --        Scheduled Meds:  Current Facility-Administered Medications:  amLODIPine 2 5 mg Oral Daily Richard Pederson PA-C   aspirin 81 mg Oral Daily Richard Pederson PA-C   heparin (porcine) 5,000 Units Subcutaneous Q8H [address] Richard Pederson PA-C   insulin glargine 15 Units Subcutaneous HS Richard Pederson PA-C   insulin lispro 1-6 Units Subcutaneous TID AC Richard Pederson PA-C   insulin lispro 1-6 Units Subcutaneous HS Richard Pederson PA-C   ondansetron 4 mg Intravenous Q6H PRN Kelsey Larson PA-C   palbociclib 100 mg Oral Daily Richard Pederson PA-C     Continuous Infusions:   PRN Meds: ondansetron    Surgical procedures (if appropriate):

## 2018-05-11 ENCOUNTER — LAB (OUTPATIENT)
Dept: LAB | Facility: MEDICAL CENTER | Age: 83
End: 2018-05-11
Payer: MEDICARE

## 2018-05-11 DIAGNOSIS — C50.111 MALIGNANT NEOPLASM OF CENTRAL PORTION OF RIGHT BREAST IN FEMALE, ESTROGEN RECEPTOR POSITIVE (HCC): ICD-10-CM

## 2018-05-11 DIAGNOSIS — Z17.0 MALIGNANT NEOPLASM OF CENTRAL PORTION OF RIGHT BREAST IN FEMALE, ESTROGEN RECEPTOR POSITIVE (HCC): ICD-10-CM

## 2018-05-11 LAB
ALBUMIN SERPL BCP-MCNC: 2.3 G/DL (ref 3.5–5)
ALP SERPL-CCNC: 288 U/L (ref 46–116)
ALT SERPL W P-5'-P-CCNC: 76 U/L (ref 12–78)
ANION GAP SERPL CALCULATED.3IONS-SCNC: 10 MMOL/L (ref 4–13)
AST SERPL W P-5'-P-CCNC: 129 U/L (ref 5–45)
BASOPHILS # BLD AUTO: 0.01 THOUSANDS/ΜL (ref 0–0.1)
BASOPHILS NFR BLD AUTO: 0 % (ref 0–1)
BILIRUB SERPL-MCNC: 2.64 MG/DL (ref 0.2–1)
BUN SERPL-MCNC: 40 MG/DL (ref 5–25)
CALCIUM SERPL-MCNC: 9.4 MG/DL (ref 8.3–10.1)
CANCER AG27-29 SERPL-ACNC: 2283.4 U/ML (ref 0–42.3)
CHLORIDE SERPL-SCNC: 103 MMOL/L (ref 100–108)
CO2 SERPL-SCNC: 22 MMOL/L (ref 21–32)
CREAT SERPL-MCNC: 2.1 MG/DL (ref 0.6–1.3)
EOSINOPHIL # BLD AUTO: 0.02 THOUSAND/ΜL (ref 0–0.61)
EOSINOPHIL NFR BLD AUTO: 1 % (ref 0–6)
ERYTHROCYTE [DISTWIDTH] IN BLOOD BY AUTOMATED COUNT: 16.4 % (ref 11.6–15.1)
GFR SERPL CREATININE-BSD FRML MDRD: 20 ML/MIN/1.73SQ M
GLUCOSE SERPL-MCNC: 252 MG/DL (ref 65–140)
HCT VFR BLD AUTO: 33.8 % (ref 34.8–46.1)
HGB BLD-MCNC: 11.3 G/DL (ref 11.5–15.4)
LYMPHOCYTES # BLD AUTO: 1.02 THOUSANDS/ΜL (ref 0.6–4.47)
LYMPHOCYTES NFR BLD AUTO: 35 % (ref 14–44)
MCH RBC QN AUTO: 37.8 PG (ref 26.8–34.3)
MCHC RBC AUTO-ENTMCNC: 33.4 G/DL (ref 31.4–37.4)
MCV RBC AUTO: 113 FL (ref 82–98)
MONOCYTES # BLD AUTO: 0.31 THOUSAND/ΜL (ref 0.17–1.22)
MONOCYTES NFR BLD AUTO: 11 % (ref 4–12)
NEUTROPHILS # BLD AUTO: 1.56 THOUSANDS/ΜL (ref 1.85–7.62)
NEUTS SEG NFR BLD AUTO: 53 % (ref 43–75)
NRBC BLD AUTO-RTO: 0 /100 WBCS
PLATELET # BLD AUTO: 79 THOUSANDS/UL (ref 149–390)
PMV BLD AUTO: 11.9 FL (ref 8.9–12.7)
POTASSIUM SERPL-SCNC: 4.5 MMOL/L (ref 3.5–5.3)
PROT SERPL-MCNC: 5.6 G/DL (ref 6.4–8.2)
RBC # BLD AUTO: 2.99 MILLION/UL (ref 3.81–5.12)
SODIUM SERPL-SCNC: 135 MMOL/L (ref 136–145)
WBC # BLD AUTO: 2.92 THOUSAND/UL (ref 4.31–10.16)

## 2018-05-11 PROCEDURE — 86300 IMMUNOASSAY TUMOR CA 15-3: CPT

## 2018-05-11 PROCEDURE — 80053 COMPREHEN METABOLIC PANEL: CPT

## 2018-05-11 PROCEDURE — 85025 COMPLETE CBC W/AUTO DIFF WBC: CPT

## 2018-05-11 PROCEDURE — 36415 COLL VENOUS BLD VENIPUNCTURE: CPT

## 2018-05-12 ENCOUNTER — HOSPITAL ENCOUNTER (EMERGENCY)
Facility: HOSPITAL | Age: 83
Discharge: HOME | End: 2018-05-13
Attending: EMERGENCY MEDICINE | Admitting: EMERGENCY MEDICINE
Payer: MEDICARE

## 2018-05-12 VITALS
HEIGHT: 65 IN | TEMPERATURE: 97.1 F | DIASTOLIC BLOOD PRESSURE: 52 MMHG | WEIGHT: 178.13 LBS | SYSTOLIC BLOOD PRESSURE: 134 MMHG | RESPIRATION RATE: 20 BRPM | OXYGEN SATURATION: 99 % | BODY MASS INDEX: 29.68 KG/M2 | HEART RATE: 93 BPM
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
## 2018-05-12 DIAGNOSIS — R53.81 PHYSICAL DECONDITIONING: ICD-10-CM

## 2018-05-12 DIAGNOSIS — S51.002A AVULSION OF SKIN OF ELBOW, LEFT, INITIAL ENCOUNTER: ICD-10-CM

## 2018-05-12 LAB — HOLD SPECIMEN: NORMAL

## 2018-05-12 PROCEDURE — 36415 COLL VENOUS BLD VENIPUNCTURE: CPT | Performed by: EMERGENCY MEDICINE

## 2018-05-12 PROCEDURE — 93005 ELECTROCARDIOGRAM TRACING: CPT

## 2018-05-13 PROCEDURE — 99284 EMERGENCY DEPT VISIT MOD MDM: CPT

## 2018-05-13 NOTE — ED NOTES
Pt  35 [facility] to give report receiving nurse not available to take report @ the moment       Fabio Azul RN  05/12/18 9144

## 2018-05-13 NOTE — DISCHARGE INSTRUCTIONS
Skin Avulsion   WHAT YOU NEED TO KNOW:   Skin avulsion is a wound that happens when skin is torn from your body during an accident or other injury  The torn skin may be lost or too damaged to be repaired, and it must be removed  A wound of this type cannot be stitched closed because there is tissue missing  Avulsion wounds are usually bigger and have more scars because of the missing tissue  DISCHARGE INSTRUCTIONS:   Medicines:   · Antibiotic ointment:  Your healthcare provider may tell you to gently rub a topical antibiotic ointment on your wound  This will help prevent an infection and help your wound heal faster  · Pain medicine: You may be given medicine to take away or decrease pain  Do not wait until the pain is severe before you take your medicine  · NSAIDs , such as ibuprofen, help decrease swelling, pain, and fever  This medicine is available with or without a doctor's order  NSAIDs can cause stomach bleeding or kidney problems in certain people  If you take blood thinner medicine, always ask if NSAIDs are safe for you  Always read the medicine label and follow directions  Do not give these medicines to children under 10months of age without direction from your child's healthcare provider  · Take your medicine as directed  Contact your healthcare provider if you think your medicine is not helping or if you have side effects  Tell him of her if you are allergic to any medicine  Keep a list of the medicines, vitamins, and herbs you take  Include the amounts, and when and why you take them  Bring the list or the pill bottles to follow-up visits  Carry your medicine list with you in case of an emergency  Care for your wound:  Avulsion wounds may take longer to heal because they cannot be closed with tape or stitches  Keep your wound clean and protected to prevent infection and speed healing  · Clean your wound:  Wash your hands with soap and water before and after you care for your wound   You may be able to use a soft cloth to gently clean the wound after the first 24 to 48 hours  After that, gently clean the wound once or twice a day with cool water  Do not soak your wound  Use soap to clean around the wound, but try not to get any on the wound itself  Do not use alcohol or hydrogen peroxide to clean your wound unless you are directed to  Gently pat the area dry and reapply the bandage as directed  · Elevate your wound:  Prop your injured area on pillows to raise it above the level of your heart  This will help reduce pain and swelling  Do this for 30 minutes at a time, as often as you can  · Bandage your wound:  Bandages keep your wound clean, dry, and protected from infection  They may also prevent swelling  Use a bandage that does not stick to your wound, and has a spongy layer to absorb fluids  Leave your bandage on as long as directed  Ask your healthcare provider when and how to change your bandage  Do not wrap the bandage too tightly  This could cut off blood flow and cause more injury  · Use cool compresses:  Wet a washcloth or towel with cool water and hold it on your wound as directed  Ask how often to apply the compress and for how long each time  · Reduce scarring:  Avoid direct sunlight on your wound  Sunlight may burn or change the color of the new skin over your wound  Use sunscreen (SPF 30 or higher) on the new skin for at least 1 year after it heals  Support for leg and arm wounds: You may need to use crutches if the wound is on your leg  You may need to use a sling if the wound is on your arm  Crutches and slings help protect the injured area, prevent further injury, and heal the area in the right position  Follow up with your healthcare provider within 2 days or as directed: If you have stitches, ask when to return to have them removed  Write down your questions so you remember to ask them during your visits     Contact your healthcare provider if:   · You have new pain, or it gets worse  · You have trouble moving the injured body area  · Your wound splits open or does not seem to be healing  Return to the emergency department if:   · You have a fever  · You have painful swelling, redness, or warmth around your wound  · Your wound is red and there are red streaks on your skin starting at your wound and moving upward  · Your wound is draining pus  · You have heavy bleeding or bleeding that does not stop after 10 minutes of holding firm, direct pressure over the wound  · You feel like there is an object stuck in your wound  © 2017 [facility] Information is for End User's use only and may not be sold, redistributed or otherwise used for commercial purposes  All illustrations and images included in CareNotes® are the copyrighted property of [employer] , [employer]  or Jose Mchugh  The above information is an  only  It is not intended as medical advice for individual conditions or treatments  Talk to your doctor, nurse or pharmacist before following any medical regimen to see if it is safe and effective for you

## 2018-05-13 NOTE — ED PROVIDER NOTES
History  Chief Complaint   Patient presents with    Fall     Patient presents via EMS from [address] [facility] following a fall  Has noticable swelling to L hand  Patient reportedly was seen at this facility earlier this week for another fall (pt has dressing to L elbow upon arrival to ED)  Staff at facility reports pt is Ok when sitting but seems to have issues when going from sitting to standing  81 y/o female presents today after a fall at her nursing home just prior to arrival   She states she was walking down the [place] and her legs just 'gave out'  History provided by:  Patient  Fall   Mechanism of injury: fall    Injury location: no injury  Incident location:  Nursing home  Arrived directly from scene: yes    Fall:     Fall occurred:  Walking    Impact surface:  Unable to U S  [employer] of impact:  Knees  Suspicion of alcohol use: no    Suspicion of drug use: no    Tetanus status:  Up to date  Associated symptoms: no abdominal pain, no back pain and no chest pain        Prior to Admission Medications   Prescriptions Last Dose Informant Patient Reported? Taking? Calcium Carbonate-Vitamin D3 (CALCIUM 600/VITAMIN D) 600-400 MG-UNIT TABS  Child Yes Yes   Sig: Take 1 tablet by mouth daily   amLODIPine (NORVASC) 2 5 mg tablet  Child Yes Yes   Sig: Take 2 5 mg by mouth daily     aspirin (ECOTRIN LOW STRENGTH) 81 mg EC tablet   Yes Yes   Sig: Take 81 mg by mouth daily   bismuth subsalicylate (PEPTO BISMOL) 524 mg/30 mL oral suspension   Yes Yes   Sig: Take 30 mL by mouth as needed for indigestion   cyanocobalamin (VITAMIN B-12) 1,000 mcg tablet   Yes Yes   Sig: Take 1,000 mcg by mouth daily   dextromethorphan-guaiFENesin (ROBAFEN DM)  mg/5 mL oral syrup   Yes Yes   Sig: Take 5 mL by mouth as needed   glucose 4 g chewable tablet   Yes Yes   Sig: Chew as needed for low blood sugar   insulin aspart (NovoLOG) 100 units/mL injection  Child Yes Yes   Sig: Inject under the skin 3 (three) times a day before meals Sliding scale    insulin degludec (TRESIBA FLEXTOUCH) injection pen 100 units/mL   Yes Yes   Sig: Inject 15 Units under the skin daily at bedtime   palbociclib (IBRANCE) 100 MG capsule   No Yes   Sig: Take 1 capsule (100 mg total) by mouth daily x 21 days then off for 7 days      Facility-Administered Medications: None       Past Medical History:   Diagnosis Date    Anemia     Bone metastases ([place] [address] )     Breast cancer ([place] [address] )     Diabetes mellitus ([place] [address] )     Hemochromatosis     Hypertension     Liver metastases ([place] [address] )        History reviewed  No pertinent surgical history  History reviewed  No pertinent family history  I have reviewed and agree with the history as documented  Social History   Substance Use Topics    Smoking status: Never Smoker    Smokeless tobacco: Never Used    Alcohol use No        Review of Systems   Constitutional: Negative for diaphoresis, fatigue and fever  HENT: Negative for congestion  Eyes: Negative for visual disturbance  Respiratory: Negative for chest tightness and shortness of breath  Cardiovascular: Positive for leg swelling  Negative for chest pain  Gastrointestinal: Negative for abdominal pain  Genitourinary: Negative for difficulty urinating  Musculoskeletal: Negative for back pain  Skin: Positive for wound  Negative for pallor and rash  Neurological: Negative for dizziness and weakness  Hematological: Bruises/bleeds easily  Psychiatric/Behavioral: Negative for confusion         Physical Exam  ED Triage Vitals [05/12/18 2057]   Temperature Pulse Respirations Blood Pressure SpO2   (!) 97 1 °F (36 2 °C) 95 16 117/63 99 %      Temp Source Heart Rate Source Patient Position - Orthostatic VS BP Location FiO2 (%)   Axillary Monitor Lying Right arm --      Pain Score       No Pain           Orthostatic Vital Signs  Vitals:    05/12/18 2212 05/12/18 2215 05/12/18 2218 05/12/18 2221   BP: 137/64 128/60 106/53 134/52   Pulse: 90 90 93 93 Patient Position - Orthostatic VS: Lying - Orthostatic VS Sitting - Orthostatic VS Standing - Orthostatic VS Standing - Orthostatic VS       Physical Exam   Constitutional: She is oriented to person, place, and time  She appears well-developed and well-nourished  HENT:   Head: Normocephalic and atraumatic  Eyes: EOM are normal  Pupils are equal, round, and reactive to light  Neck: Normal range of motion  Neck supple  Cardiovascular: Normal rate  Pulmonary/Chest: Effort normal    Abdominal: Soft  Bowel sounds are normal    Musculoskeletal: Normal range of motion  b/l lower extremity swelling and erythema which she states has been present since her fall one week ago  Skin tear present on left upper extremity and some dependent edema is present in the left hand  Neurological: She is alert and oriented to person, place, and time  Patient moving all extremities equally, no focal neuro deficits noted  Skin: Skin is warm and dry  Psychiatric: She has a normal mood and affect  Nursing note and vitals reviewed  ED Medications  Medications - No data to display    Diagnostic Studies  Results Reviewed     None                 No orders to display              Procedures  ECG 12 Lead Documentation  Date/Time: 5/12/2018 10:16 PM  Performed by: Kimberly Cai by: Lali Will     Indications / Diagnosis:  Fall  ECG reviewed by me, the ED Provider: yes    Patient location:  ED  Previous ECG:     Previous ECG:  Compared to current  Comments:      Normal sinus rhythm at 93 beats per minute  Normal axis, low-voltage QRS, slow R progression  Nonspecific ST T wave abnormalities no obvious change when compared to prior from 05/07/2018             Phone Contacts  ED Phone Contact    ED Course                               MDM  Number of Diagnoses or Management Options  Avulsion of skin of elbow, left, initial encounter: new and requires workup  Fall, initial encounter: new and requires workup  Physical deconditioning: new and requires workup  Diagnosis management comments: 11:01 PM  Orthostatic vital signs normal   Patient is physically deconditioned and the plan when she was discharged 5 days ago is that she will be set up for home PT/OT  She is able to ambulate  Hemodynamically normal   She wants to go home  Will d/c  Left upper extremity has a significant skin avulsion which was redressed by nursing  Recommend elevation of the left upper extremity to help alleviate the edema  11:33 PM  Called to bedside by family because they are extremely upset that the patient is being discharged back to the nursing home  I reviewed the extensive workup that the patient had as an inpatient 4 days ago and the recommendation was home physical therapy  I explained to the family that physical therapy can take care of the lower extremity edema as well  They think we,as medical professionals, are missing something  I again reviewed the extensive workup and explained that I didn't feel there was much else available to do that wasn't already done  11:57 PM  Family has discussed the situation and have decided that they agree with the recommendations for physical therapy and they said "maybe we need to make sure the assisted living nurses remind her to not try and walk as far'  Will d/c          Amount and/or Complexity of Data Reviewed  Obtain history from someone other than the patient: yes  Review and summarize past medical records: yes    Risk of Complications, Morbidity, and/or Mortality  Presenting problems: moderate  Diagnostic procedures: moderate  Management options: low    Patient Progress  Patient progress: stable    CritCare Time    Disposition  Final diagnoses:   Fall, initial encounter   Physical deconditioning   Avulsion of skin of elbow, left, initial encounter     Time reflects when diagnosis was documented in both MDM as applicable and the Disposition within this note     Time User Action Codes Description Comment    5/12/2018 11:02 PM Sid Reid Add [I19  WSXO] Fall, initial encounter     5/12/2018 11:02 PM Sid Reid Add [R53 81] Physical deconditioning     5/12/2018 11:02 PM Aj Levin Add [S51 002A] Avulsion of skin of elbow, left, initial encounter       ED Disposition     ED Disposition Condition Comment    Discharge  [address]  discharge to home/self care  Condition at discharge: Stable        Follow-up Information     Follow up With Specialties Details Why Contact Info Additional Information    Frandy Lowry MD Family Medicine Schedule an appointment as soon as possible for a visit  [address] [facility] [address] [facility] [address] [place] [address] Emergency Department Emergency Medicine  If symptoms worsen [address] [facility]  AN ED, [address], [place], [place], [address]        Patient's Medications   Discharge Prescriptions    No medications on file     No discharge procedures on file      ED Provider  Electronically Signed by           Supriya Reyes DO  05/13/18 0000

## 2018-05-14 LAB
ATRIAL RATE: 93 BPM
P AXIS: 54 DEGREES
PR INTERVAL: 148 MS
QRS AXIS: -6 DEGREES
QRSD INTERVAL: 76 MS
QT INTERVAL: 336 MS
QTC INTERVAL: 417 MS
T WAVE AXIS: 15 DEGREES
VENTRICULAR RATE: 93 BPM

## 2018-05-14 PROCEDURE — 93010 ELECTROCARDIOGRAM REPORT: CPT | Performed by: INTERNAL MEDICINE

## 2018-05-14 RX ORDER — LAMOTRIGINE 25 MG/1
250 TABLET ORAL ONCE
Status: DISCONTINUED | OUTPATIENT
Start: 2018-05-15 | End: 2018-05-18 | Stop reason: HOSPADM

## 2018-05-15 ENCOUNTER — TELEPHONE (OUTPATIENT)
Dept: HEMATOLOGY ONCOLOGY | Facility: CLINIC | Age: 83
End: 2018-05-15

## 2018-05-15 ENCOUNTER — HOSPITAL ENCOUNTER (OUTPATIENT)
Dept: INFUSION CENTER | Facility: CLINIC | Age: 83
Discharge: HOME | End: 2018-05-15

## 2018-05-15 NOTE — TELEPHONE ENCOUNTER
Patient's daughter called  Patient is due for injections of Xgeva and Faslodex at [facility] at [place] [address] today  Patient is weak and not able to go  She did not cancel her appointment at this time would like to speak to someone

## 2018-05-15 NOTE — TELEPHONE ENCOUNTER
Called and spoke with the patient's daughter, Vivien Hamman  She reports that the patient fell last week at her assisted living and was taken to the hospital and admitted  The doctors were unable to determine the patient's cause of leg weakness  Patient fell again this Saturday, and was discharged from the hospital  Patient's daughter is cancelling the patient's xgeva and faslodex injections

## 2018-05-16 ENCOUNTER — HOSPITAL ENCOUNTER (INPATIENT)
Facility: HOSPITAL | Age: 83
LOS: 6 days | Discharge: NURSING, REHAB OR LONG-TERM CARE | DRG: 604 | End: 2018-05-22
Attending: EMERGENCY MEDICINE | Admitting: INTERNAL MEDICINE
Payer: MEDICARE

## 2018-05-16 ENCOUNTER — TELEPHONE (OUTPATIENT)
Dept: HEMATOLOGY ONCOLOGY | Facility: CLINIC | Age: 83
End: 2018-05-16

## 2018-05-16 ENCOUNTER — APPOINTMENT (EMERGENCY)
Dept: RADIOLOGY | Facility: HOSPITAL | Age: 83
DRG: 604 | End: 2018-05-16
Payer: MEDICARE

## 2018-05-16 DIAGNOSIS — R74.01 TRANSAMINITIS: ICD-10-CM

## 2018-05-16 DIAGNOSIS — C79.51 BONE METASTASIS (HCC): ICD-10-CM

## 2018-05-16 DIAGNOSIS — B37.0 THRUSH: ICD-10-CM

## 2018-05-16 DIAGNOSIS — S51.009A: ICD-10-CM

## 2018-05-16 DIAGNOSIS — C77.3 MALIGNANT NEOPLASM METASTATIC TO LYMPH NODE OF AXILLA (HCC): ICD-10-CM

## 2018-05-16 DIAGNOSIS — S81.801D LEG WOUND, RIGHT, SUBSEQUENT ENCOUNTER: ICD-10-CM

## 2018-05-16 DIAGNOSIS — R60.0 EDEMA OF BOTH LEGS: ICD-10-CM

## 2018-05-16 DIAGNOSIS — C78.00 MALIGNANT NEOPLASM METASTATIC TO LUNG, UNSPECIFIED LATERALITY (HCC): ICD-10-CM

## 2018-05-16 DIAGNOSIS — C50.919 METASTATIC BREAST CANCER (HCC): ICD-10-CM

## 2018-05-16 DIAGNOSIS — R29.6 FALLS FREQUENTLY: ICD-10-CM

## 2018-05-16 DIAGNOSIS — C78.7 LIVER METASTASES (HCC): ICD-10-CM

## 2018-05-16 DIAGNOSIS — E80.6 HYPERBILIRUBINEMIA: ICD-10-CM

## 2018-05-16 DIAGNOSIS — N30.80 EMPHYSEMATOUS CYSTITIS: Primary | ICD-10-CM

## 2018-05-16 LAB
ALBUMIN SERPL BCP-MCNC: 2.2 G/DL (ref 3.5–5)
ALP SERPL-CCNC: 323 U/L (ref 46–116)
ALT SERPL W P-5'-P-CCNC: 69 U/L (ref 12–78)
ANION GAP SERPL CALCULATED.3IONS-SCNC: 9 MMOL/L (ref 4–13)
AST SERPL W P-5'-P-CCNC: 129 U/L (ref 5–45)
BACTERIA UR QL AUTO: ABNORMAL /HPF
BASOPHILS # BLD AUTO: 0.01 THOUSANDS/ΜL (ref 0–0.1)
BASOPHILS NFR BLD AUTO: 0 % (ref 0–1)
BILIRUB SERPL-MCNC: 3.65 MG/DL (ref 0.2–1)
BILIRUB UR QL STRIP: ABNORMAL
BUN SERPL-MCNC: 51 MG/DL (ref 5–25)
CALCIUM SERPL-MCNC: 9.7 MG/DL (ref 8.3–10.1)
CHLORIDE SERPL-SCNC: 102 MMOL/L (ref 100–108)
CLARITY UR: ABNORMAL
CLARITY, POC: CLEAR
CO2 SERPL-SCNC: 25 MMOL/L (ref 21–32)
COLOR UR: ABNORMAL
COLOR, POC: NORMAL
CREAT SERPL-MCNC: 2.45 MG/DL (ref 0.6–1.3)
EOSINOPHIL # BLD AUTO: 0.01 THOUSAND/ΜL (ref 0–0.61)
EOSINOPHIL NFR BLD AUTO: 0 % (ref 0–6)
ERYTHROCYTE [DISTWIDTH] IN BLOOD BY AUTOMATED COUNT: 17.4 % (ref 11.6–15.1)
FOLATE SERPL-MCNC: 13.8 NG/ML (ref 3.1–17.5)
GFR SERPL CREATININE-BSD FRML MDRD: 17 ML/MIN/1.73SQ M
GLUCOSE SERPL-MCNC: 251 MG/DL (ref 65–140)
GLUCOSE UR STRIP-MCNC: NEGATIVE MG/DL
HCT VFR BLD AUTO: 32.5 % (ref 34.8–46.1)
HGB BLD-MCNC: 11.3 G/DL (ref 11.5–15.4)
HGB UR QL STRIP.AUTO: ABNORMAL
HYALINE CASTS #/AREA URNS LPF: ABNORMAL /LPF
IMM GRANULOCYTES # BLD AUTO: 0.01 THOUSAND/UL (ref 0–0.2)
IMM GRANULOCYTES NFR BLD AUTO: 0 % (ref 0–2)
KETONES UR STRIP-MCNC: NEGATIVE MG/DL
LEUKOCYTE ESTERASE UR QL STRIP: ABNORMAL
LYMPHOCYTES # BLD AUTO: 0.82 THOUSANDS/ΜL (ref 0.6–4.47)
LYMPHOCYTES NFR BLD AUTO: 26 % (ref 14–44)
MCH RBC QN AUTO: 39.2 PG (ref 26.8–34.3)
MCHC RBC AUTO-ENTMCNC: 34.8 G/DL (ref 31.4–37.4)
MCV RBC AUTO: 113 FL (ref 82–98)
MONOCYTES # BLD AUTO: 0.34 THOUSAND/ΜL (ref 0.17–1.22)
MONOCYTES NFR BLD AUTO: 11 % (ref 4–12)
NEUTROPHILS # BLD AUTO: 1.96 THOUSANDS/ΜL (ref 1.85–7.62)
NEUTS SEG NFR BLD AUTO: 62 % (ref 43–75)
NITRITE UR QL STRIP: NEGATIVE
NON-SQ EPI CELLS URNS QL MICRO: ABNORMAL /HPF
NRBC BLD AUTO-RTO: 1 /100 WBCS
PH UR STRIP.AUTO: 5.5 [PH] (ref 4.5–8)
PLATELET # BLD AUTO: 99 THOUSANDS/UL (ref 149–390)
PMV BLD AUTO: 11.2 FL (ref 8.9–12.7)
POTASSIUM SERPL-SCNC: 4.6 MMOL/L (ref 3.5–5.3)
PROT SERPL-MCNC: 5.5 G/DL (ref 6.4–8.2)
PROT UR STRIP-MCNC: ABNORMAL MG/DL
RBC # BLD AUTO: 2.88 MILLION/UL (ref 3.81–5.12)
RBC #/AREA URNS AUTO: ABNORMAL /HPF
SODIUM SERPL-SCNC: 136 MMOL/L (ref 136–145)
SP GR UR STRIP.AUTO: 1.02 (ref 1–1.03)
TROPONIN I SERPL-MCNC: <0.02 NG/ML
UROBILINOGEN UR QL STRIP.AUTO: 4 E.U./DL
VIT B12 SERPL-MCNC: >6000 PG/ML (ref 100–900)
WBC # BLD AUTO: 3.15 THOUSAND/UL (ref 4.31–10.16)
WBC #/AREA URNS AUTO: ABNORMAL /HPF

## 2018-05-16 PROCEDURE — 72125 CT NECK SPINE W/O DYE: CPT

## 2018-05-16 PROCEDURE — 82746 ASSAY OF FOLIC ACID SERUM: CPT | Performed by: STUDENT IN AN ORGANIZED HEALTH CARE EDUCATION/TRAINING PROGRAM

## 2018-05-16 PROCEDURE — 70450 CT HEAD/BRAIN W/O DYE: CPT

## 2018-05-16 PROCEDURE — 81002 URINALYSIS NONAUTO W/O SCOPE: CPT | Performed by: STUDENT IN AN ORGANIZED HEALTH CARE EDUCATION/TRAINING PROGRAM

## 2018-05-16 PROCEDURE — 87077 CULTURE AEROBIC IDENTIFY: CPT

## 2018-05-16 PROCEDURE — 82607 VITAMIN B-12: CPT | Performed by: STUDENT IN AN ORGANIZED HEALTH CARE EDUCATION/TRAINING PROGRAM

## 2018-05-16 PROCEDURE — 93005 ELECTROCARDIOGRAM TRACING: CPT

## 2018-05-16 PROCEDURE — 84484 ASSAY OF TROPONIN QUANT: CPT | Performed by: EMERGENCY MEDICINE

## 2018-05-16 PROCEDURE — 36415 COLL VENOUS BLD VENIPUNCTURE: CPT

## 2018-05-16 PROCEDURE — 87086 URINE CULTURE/COLONY COUNT: CPT

## 2018-05-16 PROCEDURE — 85025 COMPLETE CBC W/AUTO DIFF WBC: CPT | Performed by: EMERGENCY MEDICINE

## 2018-05-16 PROCEDURE — 0HQEXZZ REPAIR LEFT LOWER ARM SKIN, EXTERNAL APPROACH: ICD-10-PCS | Performed by: EMERGENCY MEDICINE

## 2018-05-16 PROCEDURE — 71046 X-RAY EXAM CHEST 2 VIEWS: CPT

## 2018-05-16 PROCEDURE — 80053 COMPREHEN METABOLIC PANEL: CPT | Performed by: EMERGENCY MEDICINE

## 2018-05-16 PROCEDURE — 87186 SC STD MICRODIL/AGAR DIL: CPT

## 2018-05-16 PROCEDURE — 81001 URINALYSIS AUTO W/SCOPE: CPT

## 2018-05-16 PROCEDURE — 74176 CT ABD & PELVIS W/O CONTRAST: CPT

## 2018-05-16 RX ORDER — LIDOCAINE HYDROCHLORIDE 10 MG/ML
5 INJECTION, SOLUTION EPIDURAL; INFILTRATION; INTRACAUDAL; PERINEURAL ONCE
Status: COMPLETED | OUTPATIENT
Start: 2018-05-16 | End: 2018-05-16

## 2018-05-16 RX ADMIN — LIDOCAINE HYDROCHLORIDE 5 ML: 10 INJECTION, SOLUTION EPIDURAL; INFILTRATION; INTRACAUDAL; PERINEURAL at 20:19

## 2018-05-16 RX ADMIN — CEFTRIAXONE 1000 MG: 1 INJECTION, SOLUTION INTRAVENOUS at 22:52

## 2018-05-16 NOTE — TELEPHONE ENCOUNTER
Call from Christian Pop at [facility] to report can disregard [facility] letter about [facility] inability to fill Ibrance  [facility] will dispense from [place] and as long as within the state of [place] [facility] can continue as pharmacy

## 2018-05-16 NOTE — ED ATTENDING ATTESTATION
Bg Torre MD, saw and evaluated the patient  I have discussed the patient with the resident/non-physician practitioner and agree with the resident's/non-physician practitioner's findings, Plan of Care, and MDM as documented in the resident's/non-physician practitioner's note, except where noted  All available labs and Radiology studies were reviewed  At this point I agree with the current assessment done in the Emergency Department  I have conducted an independent evaluation of this patient including a focused history and a physical exam       61-year-old female, history of dementia, presenting to the emergency department after fall  Patient has a history of multiple falls  The patient is unable to provide history surrounding the fall secondary to her dementia  Patient complains of pain in her forehead where she has a significant ecchymosis  Patient denies pain in all other locations including neck, back, chest, abdomen, extremities  Patient has underlying dementia makes her a poor historian and her review of systems not reliable  On examination the patient is noted to have a moderate cephalhematoma of the forehead  Eyelids lashes are unremarkable  Extraocular movements are intact  No intraoral trauma  No hemotympanum bilaterally  Neck is nontender to palpation  Chest is nontender to palpation  Lungs are clear to auscultation bilaterally  Heart is regular rate and rhythm with no murmurs rubs or gallops  Abdomen is soft and nontender to palpation  Right upper extremity is significant for ecchymosis of the forearm  Nontender to palpation over the bony areas in painless range of motion  Left upper extremity is significant for an area of superficial skin tear measuring approximately 8 cm x 6 cm  In the center of this area of skin tear there are 2 small (0 8 cm x2) lacerations that are full-thickness through the subcutaneous fat with exposure of underlying fascia    Cranial nerves 2-12 are intact  Motor is 5/5 bilateral upper lower extremities  68-year-old female with history of dementia presenting after fall of unclear etiology  Plan is evaluation for trauma with CT head and neck  Medical evaluation with lab work and urinalysis  Labs Reviewed   COMPREHENSIVE METABOLIC PANEL - Abnormal        Result Value Ref Range Status    Sodium 136  136 - 145 mmol/L Final    Potassium 4 6  3 5 - 5 3 mmol/L Final    Chloride 102  100 - 108 mmol/L Final    CO2 25  21 - 32 mmol/L Final    Anion Gap 9  4 - 13 mmol/L Final    BUN 51 (*) 5 - 25 mg/dL Final    Creatinine 2 45 (*) 0 60 - 1 30 mg/dL Final    Comment: Standardized to IDMS reference method    Glucose 251 (*) 65 - 140 mg/dL Final    Comment:   If the patient is fasting, the ADA then defines impaired fasting glucose as > 100 mg/dL and diabetes as > or equal to 123 mg/dL  Specimen collection should occur prior to Sulfasalazine administration due to the potential for falsely depressed results  Specimen collection should occur prior to Sulfapyridine administration due to the potential for falsely elevated results  Calcium 9 7  8 3 - 10 1 mg/dL Final     (*) 5 - 45 U/L Final    Comment:   Specimen collection should occur prior to Sulfasalazine administration due to the potential for falsely depressed results  ALT 69  12 - 78 U/L Final    Comment:   Specimen collection should occur prior to Sulfasalazine and/or Sulfapyridine administration due to the potential for falsely depressed results       Alkaline Phosphatase 323 (*) 46 - 116 U/L Final    Total Protein 5 5 (*) 6 4 - 8 2 g/dL Final    Albumin 2 2 (*) 3 5 - 5 0 g/dL Final    Total Bilirubin 3 65 (*) 0 20 - 1 00 mg/dL Final    eGFR 17  ml/min/1 73sq m Final    Narrative:     National Kidney Disease Education Program recommendations are as follows:  GFR calculation is accurate only with a steady state creatinine  Chronic Kidney disease less than 60 ml/min/1 73 sq  meters  Kidney failure less than 15 ml/min/1 73 sq  meters  CBC AND DIFFERENTIAL - Abnormal     WBC 3 15 (*) 4 31 - 10 16 Thousand/uL Final    RBC 2 88 (*) 3 81 - 5 12 Million/uL Final    Hemoglobin 11 3 (*) 11 5 - 15 4 g/dL Final    Hematocrit 32 5 (*) 34 8 - 46 1 % Final     (*) 82 - 98 fL Final    MCH 39 2 (*) 26 8 - 34 3 pg Final    MCHC 34 8  31 4 - 37 4 g/dL Final    RDW 17 4 (*) 11 6 - 15 1 % Final    MPV 11 2  8 9 - 12 7 fL Final    Platelets 99 (*) 888 - 390 Thousands/uL Final    nRBC 1  /100 WBCs Final    Neutrophils Relative 62  43 - 75 % Final    Immat GRANS % 0  0 - 2 % Final    Lymphocytes Relative 26  14 - 44 % Final    Monocytes Relative 11  4 - 12 % Final    Eosinophils Relative 0  0 - 6 % Final    Basophils Relative 0  0 - 1 % Final    Neutrophils Absolute 1 96  1 85 - 7 62 Thousands/µL Final    Immature Grans Absolute 0 01  0 00 - 0 20 Thousand/uL Final    Lymphocytes Absolute 0 82  0 60 - 4 47 Thousands/µL Final    Monocytes Absolute 0 34  0 17 - 1 22 Thousand/µL Final    Eosinophils Absolute 0 01  0 00 - 0 61 Thousand/µL Final    Basophils Absolute 0 01  0 00 - 0 10 Thousands/µL Final   TROPONIN I - Normal    Troponin I <0 02  <=0 04 ng/mL Final    Narrative:     [employer] Chemistry analyzer 99% cutoff is > 0 04 ng/mL in network labs    o cTnI 99% cutoff is useful only when applied to patients in the clinical setting of myocardial ischemia  o cTnI 99% cutoff should be interpreted in the context of clinical history, ECG findings and possibly cardiac imaging to establish correct diagnosis  o cTnI 99% cutoff may be suggestive but clearly not indicative of a coronary event without the clinical setting of myocardial ischemia  FOLATE   VITAMIN B12   POCT URINALYSIS DIPSTICK     X-ray chest 2 views   Final Result      Bibasilar atelectasis  Workstation performed: [id number]         CT cervical spine without contrast   Final Result      No cervical spine fracture or traumatic malalignment  Diffuse osseous metastatic disease  Workstation performed: [id number]         CT head without contrast   Final Result         1  No acute intracranial abnormality  2   Frontal scalp hematoma with no underlying skull fracture  Stable lytic skull metastases                    Workstation performed: [id number]

## 2018-05-17 ENCOUNTER — APPOINTMENT (INPATIENT)
Dept: NON INVASIVE DIAGNOSTICS | Facility: HOSPITAL | Age: 83
DRG: 604 | End: 2018-05-17
Payer: MEDICARE

## 2018-05-17 PROBLEM — S00.93XA HEAD CONTUSION: Status: ACTIVE | Noted: 2018-05-17

## 2018-05-17 PROBLEM — E16.2 HYPOGLYCEMIA: Status: RESOLVED | Noted: 2018-03-18 | Resolved: 2018-05-17

## 2018-05-17 LAB
ALBUMIN SERPL BCP-MCNC: 1.8 G/DL (ref 3.5–5)
ALP SERPL-CCNC: 250 U/L (ref 46–116)
ALT SERPL W P-5'-P-CCNC: 57 U/L (ref 12–78)
ANION GAP SERPL CALCULATED.3IONS-SCNC: 9 MMOL/L (ref 4–13)
AST SERPL W P-5'-P-CCNC: 102 U/L (ref 5–45)
ATRIAL RATE: 105 BPM
ATRIAL RATE: 94 BPM
ATRIAL RATE: 96 BPM
ATRIAL RATE: 97 BPM
BASOPHILS # BLD AUTO: 0.01 THOUSANDS/ΜL (ref 0–0.1)
BASOPHILS NFR BLD AUTO: 1 % (ref 0–1)
BILIRUB SERPL-MCNC: 2.99 MG/DL (ref 0.2–1)
BUN SERPL-MCNC: 48 MG/DL (ref 5–25)
CALCIUM SERPL-MCNC: 9.4 MG/DL (ref 8.3–10.1)
CHLORIDE SERPL-SCNC: 104 MMOL/L (ref 100–108)
CO2 SERPL-SCNC: 26 MMOL/L (ref 21–32)
CREAT SERPL-MCNC: 2.11 MG/DL (ref 0.6–1.3)
EOSINOPHIL # BLD AUTO: 0 THOUSAND/ΜL (ref 0–0.61)
EOSINOPHIL NFR BLD AUTO: 0 % (ref 0–6)
ERYTHROCYTE [DISTWIDTH] IN BLOOD BY AUTOMATED COUNT: 17.2 % (ref 11.6–15.1)
EST. AVERAGE GLUCOSE BLD GHB EST-MCNC: 169 MG/DL
GFR SERPL CREATININE-BSD FRML MDRD: 20 ML/MIN/1.73SQ M
GLUCOSE SERPL-MCNC: 157 MG/DL (ref 65–140)
GLUCOSE SERPL-MCNC: 163 MG/DL (ref 65–140)
GLUCOSE SERPL-MCNC: 193 MG/DL (ref 65–140)
GLUCOSE SERPL-MCNC: 193 MG/DL (ref 65–140)
GLUCOSE SERPL-MCNC: 255 MG/DL (ref 65–140)
HBA1C MFR BLD: 7.5 % (ref 4.2–6.3)
HCT VFR BLD AUTO: 24.7 % (ref 34.8–46.1)
HGB BLD-MCNC: 8.5 G/DL (ref 11.5–15.4)
IMM GRANULOCYTES # BLD AUTO: 0 THOUSAND/UL (ref 0–0.2)
IMM GRANULOCYTES NFR BLD AUTO: 0 % (ref 0–2)
LYMPHOCYTES # BLD AUTO: 0.54 THOUSANDS/ΜL (ref 0.6–4.47)
LYMPHOCYTES NFR BLD AUTO: 36 % (ref 14–44)
MAGNESIUM SERPL-MCNC: 2.1 MG/DL (ref 1.6–2.6)
MCH RBC QN AUTO: 38.1 PG (ref 26.8–34.3)
MCHC RBC AUTO-ENTMCNC: 34.4 G/DL (ref 31.4–37.4)
MCV RBC AUTO: 111 FL (ref 82–98)
MONOCYTES # BLD AUTO: 0.2 THOUSAND/ΜL (ref 0.17–1.22)
MONOCYTES NFR BLD AUTO: 13 % (ref 4–12)
NEUTROPHILS # BLD AUTO: 0.77 THOUSANDS/ΜL (ref 1.85–7.62)
NEUTS SEG NFR BLD AUTO: 51 % (ref 43–75)
NRBC BLD AUTO-RTO: 0 /100 WBCS
P AXIS: -71 DEGREES
P AXIS: 104 DEGREES
P AXIS: 53 DEGREES
P AXIS: 87 DEGREES
PLATELET # BLD AUTO: 52 THOUSANDS/UL (ref 149–390)
PMV BLD AUTO: 8.9 FL (ref 8.9–12.7)
POTASSIUM SERPL-SCNC: 4.2 MMOL/L (ref 3.5–5.3)
PR INTERVAL: 150 MS
PR INTERVAL: 152 MS
PR INTERVAL: 156 MS
PR INTERVAL: 158 MS
PROT SERPL-MCNC: 4.7 G/DL (ref 6.4–8.2)
QRS AXIS: -3 DEGREES
QRS AXIS: -5 DEGREES
QRS AXIS: -6 DEGREES
QRS AXIS: -7 DEGREES
QRSD INTERVAL: 60 MS
QRSD INTERVAL: 70 MS
QRSD INTERVAL: 74 MS
QRSD INTERVAL: 74 MS
QT INTERVAL: 316 MS
QT INTERVAL: 316 MS
QT INTERVAL: 318 MS
QT INTERVAL: 338 MS
QTC INTERVAL: 401 MS
QTC INTERVAL: 401 MS
QTC INTERVAL: 431 MS
QTC INTERVAL: 440 MS
RBC # BLD AUTO: 2.23 MILLION/UL (ref 3.81–5.12)
SODIUM SERPL-SCNC: 139 MMOL/L (ref 136–145)
T WAVE AXIS: -20 DEGREES
T WAVE AXIS: -23 DEGREES
T WAVE AXIS: -3 DEGREES
T WAVE AXIS: -85 DEGREES
T3FREE SERPL-MCNC: 1.48 PG/ML (ref 2.3–4.2)
T4 FREE SERPL-MCNC: 1.22 NG/DL (ref 0.76–1.46)
T4 FREE SERPL-MCNC: 1.29 NG/DL (ref 0.76–1.46)
TROPONIN I SERPL-MCNC: <0.02 NG/ML
TROPONIN I SERPL-MCNC: <0.02 NG/ML
TSH SERPL DL<=0.05 MIU/L-ACNC: 5.76 UIU/ML (ref 0.36–3.74)
VENTRICULAR RATE: 117 BPM
VENTRICULAR RATE: 96 BPM
VENTRICULAR RATE: 97 BPM
VENTRICULAR RATE: 98 BPM
WBC # BLD AUTO: 1.52 THOUSAND/UL (ref 4.31–10.16)

## 2018-05-17 PROCEDURE — 93005 ELECTROCARDIOGRAM TRACING: CPT

## 2018-05-17 PROCEDURE — 84439 ASSAY OF FREE THYROXINE: CPT | Performed by: INTERNAL MEDICINE

## 2018-05-17 PROCEDURE — 99285 EMERGENCY DEPT VISIT HI MDM: CPT

## 2018-05-17 PROCEDURE — 84481 FREE ASSAY (FT-3): CPT | Performed by: INTERNAL MEDICINE

## 2018-05-17 PROCEDURE — 93970 EXTREMITY STUDY: CPT | Performed by: SURGERY

## 2018-05-17 PROCEDURE — 82948 REAGENT STRIP/BLOOD GLUCOSE: CPT

## 2018-05-17 PROCEDURE — 99223 1ST HOSP IP/OBS HIGH 75: CPT | Performed by: INTERNAL MEDICINE

## 2018-05-17 PROCEDURE — 84484 ASSAY OF TROPONIN QUANT: CPT | Performed by: NURSE PRACTITIONER

## 2018-05-17 PROCEDURE — 84439 ASSAY OF FREE THYROXINE: CPT | Performed by: NURSE PRACTITIONER

## 2018-05-17 PROCEDURE — 85025 COMPLETE CBC W/AUTO DIFF WBC: CPT | Performed by: NURSE PRACTITIONER

## 2018-05-17 PROCEDURE — 80053 COMPREHEN METABOLIC PANEL: CPT | Performed by: NURSE PRACTITIONER

## 2018-05-17 PROCEDURE — 93970 EXTREMITY STUDY: CPT

## 2018-05-17 PROCEDURE — 36415 COLL VENOUS BLD VENIPUNCTURE: CPT | Performed by: NURSE PRACTITIONER

## 2018-05-17 PROCEDURE — 83735 ASSAY OF MAGNESIUM: CPT | Performed by: NURSE PRACTITIONER

## 2018-05-17 PROCEDURE — 83036 HEMOGLOBIN GLYCOSYLATED A1C: CPT | Performed by: NURSE PRACTITIONER

## 2018-05-17 PROCEDURE — 84443 ASSAY THYROID STIM HORMONE: CPT | Performed by: NURSE PRACTITIONER

## 2018-05-17 PROCEDURE — 93010 ELECTROCARDIOGRAM REPORT: CPT | Performed by: INTERNAL MEDICINE

## 2018-05-17 RX ORDER — B-COMPLEX WITH VITAMIN C
1 TABLET ORAL DAILY
Status: DISCONTINUED | OUTPATIENT
Start: 2018-05-18 | End: 2018-05-22 | Stop reason: HOSPADM

## 2018-05-17 RX ORDER — CALCIUM CARBONATE 200(500)MG
1000 TABLET,CHEWABLE ORAL DAILY PRN
Status: DISCONTINUED | OUTPATIENT
Start: 2018-05-17 | End: 2018-05-22 | Stop reason: HOSPADM

## 2018-05-17 RX ORDER — AMLODIPINE BESYLATE 2.5 MG/1
2.5 TABLET ORAL DAILY
Status: DISCONTINUED | OUTPATIENT
Start: 2018-05-18 | End: 2018-05-22

## 2018-05-17 RX ORDER — DOCUSATE SODIUM 100 MG/1
100 CAPSULE, LIQUID FILLED ORAL 2 TIMES DAILY
Status: DISCONTINUED | OUTPATIENT
Start: 2018-05-17 | End: 2018-05-22 | Stop reason: HOSPADM

## 2018-05-17 RX ORDER — CHOLECALCIFEROL (VITAMIN D3) 125 MCG
1000 CAPSULE ORAL DAILY
Status: DISCONTINUED | OUTPATIENT
Start: 2018-05-18 | End: 2018-05-22 | Stop reason: HOSPADM

## 2018-05-17 RX ORDER — ASPIRIN 81 MG/1
81 TABLET ORAL DAILY
Status: DISCONTINUED | OUTPATIENT
Start: 2018-05-18 | End: 2018-05-22 | Stop reason: HOSPADM

## 2018-05-17 RX ADMIN — DOCUSATE SODIUM 100 MG: 100 CAPSULE, LIQUID FILLED ORAL at 21:00

## 2018-05-17 RX ADMIN — INSULIN LISPRO 1 UNITS: 100 INJECTION, SOLUTION INTRAVENOUS; SUBCUTANEOUS at 21:00

## 2018-05-17 RX ADMIN — TBO-FILGRASTIM 300 MCG: 300 INJECTION, SOLUTION SUBCUTANEOUS at 09:55

## 2018-05-17 RX ADMIN — INSULIN LISPRO 1 UNITS: 100 INJECTION, SOLUTION INTRAVENOUS; SUBCUTANEOUS at 18:07

## 2018-05-17 RX ADMIN — INSULIN LISPRO 2 UNITS: 100 INJECTION, SOLUTION INTRAVENOUS; SUBCUTANEOUS at 01:53

## 2018-05-17 RX ADMIN — INSULIN LISPRO 2 UNITS: 100 INJECTION, SOLUTION INTRAVENOUS; SUBCUTANEOUS at 08:17

## 2018-05-17 RX ADMIN — INSULIN LISPRO 2 UNITS: 100 INJECTION, SOLUTION INTRAVENOUS; SUBCUTANEOUS at 11:36

## 2018-05-17 NOTE — ASSESSMENT & PLAN NOTE
Severe bl lower extremity pitting edema in pt with hx of metastatic cancer and hypalbuminemia  - prior admission venous duplex performed negative for dvt   - however now with significant new trauma to bl lower extremities   - elevation and wound care in vulnerable pt   - nutrition consult

## 2018-05-17 NOTE — ASSESSMENT & PLAN NOTE
Call just made to  outpt to oncology this week  per the pts daughter in regards to pts scheduled Xgeva and faslodex injections that they could not make it as the pt was feeling too weak  Will consult oncology for their input (pt usually see Dr Julian Gan) last seen in office on 1/23/18 but has had multiple injection/infusions since then  Pt was seen in office at that time regarding hormone receptor positive breast cancer, stage iv  She was noted to be tolerating her ibrance, faslodex and xgeva well with no complaints  Ca 27 29 from January 2018 noted to be 1717 7 and in December 2017,  1482  2  This admission noted on 5/11/18 to be  2283 4  Will leave to oncology further treatment or plans to be discussed with the pt  ? Possible need for MRI of brain since head ct demonstrates mets to skull  Ct head demonstrates: 1   No acute intracranial abnormality     2   Frontal scalp hematoma with no underlying skull fracture   Stable lytic skull metastases      Will initiate dvt proph due to high risk pt but will need to monitor neurological status pt with superficial head lac and recent trauma to head and entire body

## 2018-05-17 NOTE — ASSESSMENT & PLAN NOTE
Pt renal function is chronic  Will chk bladder scan in light of pts recent ct   Pt reports no urinary symptoms (no burning , frequency, fevers or chills)    Pt reports no pain with palpation   Baseline creatinine appears to be around 1 9 -2 0  (now 2 45)  Will hold off on ivf at this time   Encourage po hydration and nutrition   Avoid nephrotoxic meds   Strict I&Os

## 2018-05-17 NOTE — DISCHARGE INSTR - OTHER ORDERS
Wound care recommendations: Cleanse skin tears to b/l knees and L elbows with NSS, pat dry, apply dermagran gauze to wounds, cover with dry dressings and change every other day and as needed

## 2018-05-17 NOTE — H&P
H&P- Adriane Mosley 7/9/1927, 80 y o  female MRN: [id number]    Unit/Bed#: ED 02 Encounter: [id number]    Primary Care Provider: You Craft MD   Date and time admitted to hospital: 5/16/2018  5:08 PM        Syncope   Assessment & Plan    Suspected to be mechanical however pt found down and pt has no recollection of fall details  - pt was just here for prior fall   - will have PT/OT see pt for higher level of care already being discussed at the assisted living pt is at now  - will need to chk orthos  - continue to trend three sets of troponin's thus far first set negative   - if increased would consider cards input   - pt reports no symptoms no chest pain no chest tightness dizziness of lightheadedness   - foot pumps to lower extremities in light of trauma to lower extremities significant ecchymosis and some open areas        Bone metastasis [facility])   Assessment & Plan    Call just made to  outpt to oncology this week  per the pts daughter in regards to pts scheduled Xgeva and faslodex injections that they could not make it as the pt was feeling too weak  Will consult oncology for their input (pt usually see Dr Jacqui Olivera) last seen in office on 1/23/18 but has had multiple injection/infusions since then  Pt was seen in office at that time regarding hormone receptor positive breast cancer, stage iv  She was noted to be tolerating her ibrance, faslodex and xgeva well with no complaints  Ca 27 29 from January 2018 noted to be 1717 7 and in December 2017,  1482  2  This admission noted on 5/11/18 to be  2283 4  Will leave to oncology further treatment or plans to be discussed with the pt  ? Possible need for MRI of brain since head ct demonstrates mets to skull  Ct head demonstrates: 1   No acute intracranial abnormality     2   Frontal scalp hematoma with no underlying skull fracture   Stable lytic skull metastases      Will initiate dvt proph due to high risk pt but will need to monitor neurological status pt with superficial head lac and recent trauma to head and entire body              Liver metastases ([address] )   Assessment & Plan    Pt undergoing treatment as previously mentioned   With pancytopenia and with transaminitis   Will continue to trend but suspect progression of metastatic disease (will leave to oncology for further evaluation and discussion )   Ct abdomen and pelvis wo contrast (in light of renal disease) 1   Emphysematous cystitis  2   Worsening osseous metastatic disease throughout the axial skeleton with mild to moderate ascites in the abdomen and pelvis  3   Stable splenic lesions likely benign cysts and/or hemangiomas   Previously described liver metastases inconspicuous on this unenhanced study  chk coag panel            Head contusion   Assessment & Plan    Discussed with attending ok to continue on propholactic lovenox at this time   Ct head demonstrates: Frontal scalp hematoma with no underlying skull fracture    Will need to chk neuro chks q 4 hrs   Monitor if any drop in Hemoglobin   Monitor for mental status change and vitals         Type 2 diabetes mellitus without complication, with long-term current use of insulin (HCC)   Assessment & Plan    Will place on inpt ssi protocol   Sliding scale insulin   Diabetic diet  Nutrition consult   Substitute home long acting with lantus 15 untis at hs now passed  Elevated in last set of blood work but pt has not eaten at all most of the day per the family           Bilateral lower extremity edema   Assessment & Plan    Severe bl lower extremity pitting edema in pt with hx of metastatic cancer and hypalbuminemia  - prior admission venous duplex performed negative for dvt   - however now with significant new trauma to bl lower extremities   - elevation and wound care in vulnerable pt   - nutrition consult         Pancytopenia ([address] )   Assessment & Plan    Due to significant metastatic disease and renal disease  appreciate oncology   Continue to trend at this point         CKD (chronic kidney disease)   Assessment & Plan    Pt renal function is chronic  Will chk bladder scan in light of pts recent ct   Pt reports no urinary symptoms (no burning , frequency, fevers or chills)    Pt reports no pain with palpation   Baseline creatinine appears to be around 1 9 -2 0  (now 2 45)  Will hold off on ivf at this time   Encourage po hydration and nutrition   Avoid nephrotoxic meds   Strict I&Os              VTE Prophylaxis: Enoxaparin (Lovenox)  / foot pump applied   Code Status: full code   POLST: POLST is not applicable to this patient  Discussion with family: Discussed goals of care family want pt to be a full code they will look at her living will and try to "pull it out " to see what pts wishes were     Anticipated Length of Stay:  Patient will be admitted on an Inpatient basis with an anticipated length of stay of  Greater than 2 midnights  Justification for Hospital Stay: second fall will require higher level of care , ptot consult     Total Time for Visit, including Counseling / Coordination of Care: 1 hour  Greater than 50% of this total time spent on direct patient counseling and coordination of care  Chief Complaint:   Fall      History of Present Illness:    Kyra Garibay is a 80 y o  female who presents with syncope/fall  Pt has a hx of metastatic breast cancer, hormone receptors positive and HER-2 negative since 2016,  type 2 dm on insulin, HTN  Tumor marker ca 27 29 came down but is now increased  PET/CT scan in feb 2017 showed improvement in the right breast and axilla area mass and lung nodules but increased disease in the bone  Repeat PET /CT in May of 2017 showed new abnormal foci in the liver, and diffuse uptake in the osseous structures  Small pulmonary nodules and right breast mass as well as right axillary lymph node  New therapy was initiated   Medications were soon thereafter readjusted due to pt experiencing increased fatigue, loss of appetite and lack of interest in activities  Plan ( 1/23/18 was to continue xgeva 120mg sq every 4 weeks, 100mg po daily 1 -21 every 28 days 500mg IM monthly )  As of 5/15/18 pts daughter called oncology in regards to pts schedule to obtain xgeva and faslodex at [facility], requesting  to cancel this appointment due to pt feeling weak and unable to come to appointment  When return call was made daughter discussed the fact that the pt jose fallen weak prior at the facility and had been taken to the hospital, where the doctors were unable to explain the pts cause of leg weakness  Pt fell again on the Saturday and had been discharged from the hospital    Now pt returns sp a new fall  Pt has been unable to provide an accurate history  Pt is reported to have dementia but pt was able to tell me she was at [facility] and that she fell but could not remember the circumstances surrounding the event  Family at bedside report only that the pt was found down by staff at the nursing home assisted living  Pt did sustain a moderate cephalhematoma of the mid forehead  She has multiple ecchymotic area over entire body especially down left side of hip, anterior, lateral and posterior thigh with severe erythema/edema and ecchymosis to lower legs  Pt had also on prior hospital admission sustained a left elbow laceration that now was open and sutured by the er physician  Pt had sustained a documented "superficial skin tear measuring approximately 8 cm x 6 cm  In the center of this area of skin tear there are 2 small (0 8 cm x2) lacerations that are full-thickness through the subcutaneous fat with exposure of underlying fascia"  Ct of abdomen noted emphysematous cystitis and ed administered one time dose of ceftriaxone  Will continue to monitor and give abx due to immunosuppression, and families belief that pts acute physical endurance changes are due to acute infection       Pt currently denies any dizziness, lightheadedness, visual deficits, chest pain or palpitations  Denies  headaches  Denies any generalized body aches  Pt denies any urinary symptoms such as burning , frequency or any hematuria or problems voiding  Upon arrival to the er trop set one is negative  Head ct with: 1   No acute intracranial abnormality   2   Frontal scalp hematoma with no underlying skull fracture   Stable lytic skull metastases  Cervical ct: No cervical spine fracture or traumatic malalignment  Diffuse osseous metastatic disease  Ct abd and pelvis with: 1   Emphysematous cystitis  2   Worsening osseous metastatic disease throughout the axial skeleton with mild to moderate ascites in the abdomen and pelvis  3   Stable splenic lesions likely benign cysts and/or hemangiomas   Previously described liver metastases inconspicuous on this unenhanced study  Review of Systems:    Review of Systems   Constitutional: Positive for activity change (increase in falls ) and fatigue (pt has been having an increase in weakenss )  Negative for chills and diaphoresis  HENT: Negative  Eyes: Negative  Respiratory: Negative for cough, chest tightness, shortness of breath, wheezing and stridor  Endocrine: Negative for polydipsia, polyphagia and polyuria  Genitourinary: Negative for difficulty urinating, dysuria, flank pain, frequency, hematuria, pelvic pain, urgency and vaginal bleeding  Musculoskeletal: Positive for gait problem  Negative for back pain, joint swelling and neck stiffness  Skin: Positive for color change (multiple areas of eccymosis)  Neurological: Positive for syncope and weakness  Negative for dizziness, tremors, speech difficulty and light-headedness         Past Medical and Surgical History:     Past Medical History:   Diagnosis Date    Anemia     [facility]     [facility] cancer [facility]     Diabetes mellitus [facility]     Hemochromatosis     Hypertension     Liver metastases ([address] )        No past surgical history on file  Meds/Allergies:    Prior to Admission medications    Medication Sig Start Date End Date Taking? Authorizing Provider   amLODIPine (NORVASC) 2 5 mg tablet Take 2 5 mg by mouth daily  Yes Historical Provider, MD   aspirin (ECOTRIN LOW STRENGTH) 81 mg EC tablet Take 81 mg by mouth daily   Yes Historical Provider, MD   Calcium Carbonate-Vitamin D3 (CALCIUM 600/VITAMIN D) 600-400 MG-UNIT TABS Take 1 tablet by mouth daily   Yes Historical Provider, MD   cyanocobalamin (VITAMIN B-12) 1,000 mcg tablet Take 1,000 mcg by mouth daily   Yes Historical Provider, MD   glucose 4 g chewable tablet Chew as needed for low blood sugar   Yes Historical Provider, MD   insulin aspart (NovoLOG) 100 units/mL injection Inject under the skin 3 (three) times a day before meals Sliding scale    Yes Historical Provider, MD   insulin degludec (TRESIBA FLEXTOUCH) injection pen 100 units/mL Inject 15 Units under the skin daily at bedtime   Yes Historical Provider, MD   palbociclib (IBRANCE) 100 MG capsule Take 1 capsule (100 mg total) by mouth daily x 21 days then off for 7 days 5/5/18  Yes Neal Kinsey MD   dextromethorphan-guaiFENesin (ROBAFEN DM)  mg/5 mL oral syrup Take 5 mL by mouth as needed    Historical Provider, MD     I have reveiwed home medications using records provided by [facility]  Allergies: No Known Allergies    Social History:     Marital Status:    Occupation: From [employer]   Patient Pre-hospital Living Situation: walker   Patient Pre-hospital Level of Mobility: none   Patient Pre-hospital Diet Restrictions:  None   Substance Use History:   History   Alcohol Use No     History   Smoking Status    Never Smoker   Smokeless Tobacco    Never Used     History   Drug Use No       Family History:    No family history on file      Physical Exam:     Vitals:   Blood Pressure: 119/65 (05/17/18 0058)  Pulse: 89 (05/17/18 0058)  Temperature: (!) 97 2 °F (36 2 °C) (05/16/18 1713)  Respirations: 14 (05/17/18 0058)  Weight - Scale: 80 8 kg (178 lb 2 1 oz) (05/16/18 1857)  SpO2: 100 % (05/17/18 0058)    Physical Exam   Constitutional: She appears well-developed and well-nourished  No distress  obese   HENT:   Mouth/Throat: No oropharyngeal exudate  Mid forehead large ecchymotic contusion to the head    Eyes: Conjunctivae are normal  Right eye exhibits no discharge  Left eye exhibits no discharge  No scleral icterus  Neck: No JVD present  No tracheal deviation present  No thyromegaly present  Cardiovascular: Normal rate  Exam reveals no gallop and no friction rub  No murmur heard  Pulmonary/Chest: No stridor  No respiratory distress  She has no wheezes  She has no rales  She exhibits no tenderness  Diminished bl bases    Abdominal: She exhibits no distension and no mass  There is no tenderness  There is no rebound and no guarding  Lymphadenopathy:     She has no cervical adenopathy  Neurological: She is alert  2 -3   Skin: She is not diaphoretic  Multiple ecchymotic areas to the stomach  Left side increased  All down left side of lateral hip and upper thigh with erythema and ecchymosis  bl arms with ecchymosis and left elbow with laceration and sutures wrapped in curlex  Forehead ecchymosis          Additional Data:     Lab Results: I have personally reviewed pertinent reports          Results from last 7 days  Lab Units 05/16/18  1726   WBC Thousand/uL 3 15*   HEMOGLOBIN g/dL 11 3*   HEMATOCRIT % 32 5*   PLATELETS Thousands/uL 99*   NEUTROS PCT % 62   LYMPHS PCT % 26   MONOS PCT % 11   EOS PCT % 0       Results from last 7 days  Lab Units 05/16/18  1848   SODIUM mmol/L 136   POTASSIUM mmol/L 4 6   CHLORIDE mmol/L 102   CO2 mmol/L 25   BUN mg/dL 51*   CREATININE mg/dL 2 45*   CALCIUM mg/dL 9 7   TOTAL PROTEIN g/dL 5 5*   BILIRUBIN TOTAL mg/dL 3 65*   ALK PHOS U/L 323*   ALT U/L 69   AST U/L 129*   GLUCOSE RANDOM mg/dL 251*                   Imaging: I have personally reviewed pertinent reports  CT abdomen pelvis wo contrast   Final Result by Kristel Mayer MD (05/16 2209)         1  Emphysematous cystitis  2   Worsening osseous metastatic disease throughout the axial skeleton with mild to moderate ascites in the abdomen and pelvis  3   Stable splenic lesions likely benign cysts and/or hemangiomas  Previously described liver metastases inconspicuous on this unenhanced study  I personally discussed this study with ER Resident Johanna Manning on 5/16/2018 at 10:02 PM                         Workstation performed: [id number]         X-ray chest 2 views   Final Result by Simon Winters MD (05/16 1935)      Bibasilar atelectasis  Workstation performed: [id number]         CT cervical spine without contrast   Final Result by Kristel Mayer MD (05/16 1846)      No cervical spine fracture or traumatic malalignment  Diffuse osseous metastatic disease  Workstation performed: [id number]         CT head without contrast   Final Result by Kristel Mayer MD (05/16 1840)         1  No acute intracranial abnormality  2   Frontal scalp hematoma with no underlying skull fracture  Stable lytic skull metastases  Workstation performed: [id number]         VAS lower limb venous duplex study, unilateral/limited    (Results Pending)       EKG, Pathology, and Other Studies Reviewed on Admission:   · EKG: Sinus rhythm with sinus arrhythmia  Low voltage QRS  Possible Anterolateral infarct (cited on or before 07-MAY-2018)  Abnormal ECG  When compared with ECG of 07-MAY-2018 14:56,    [employer]scripts / [facility] Records Reviewed: Yes     ** Please Note: This note has been constructed using a voice recognition system   **

## 2018-05-17 NOTE — PROGRESS NOTES
SHORT PROGRESS NOTE  · Patient was seen and examined today in [address]  · Patient appears comfortable  C/O pain in LE, but awake alert responsive  Minimal dysuria --"but does not bother me"  · Please refer to the details as per HPI by KISHORE King Sa from today  · Follow up: Duplex LE -- NO DVT  · Follow up UC results -- if no growth deescalate IV Ceftriaxone  · Pancytopenia -- hold pharmacological DVT Px  As per heme/onco -- to receive 300 mcg granix subq  · Follow up CBC with differential, BMP in AM   · Appreciate heme/onc follow up  · Consult Palliative Medicine to address goals of therapy  · Continue to follow  · THIS IS AN UNBILLABLE NOTE

## 2018-05-17 NOTE — ASSESSMENT & PLAN NOTE
Suspected to be mechanical however pt found down and pt has no recollection of fall details  - pt was just here for prior fall   - will have PT/OT see pt for higher level of care already being discussed at the assisted living pt is at now  - will need to chk orthos  - continue to trend three sets of troponin's thus far first set negative   - if increased would consider cards input   - pt reports no symptoms no chest pain no chest tightness dizziness of lightheadedness   - foot pumps to lower extremities in light of trauma to lower extremities significant ecchymosis and some open areas

## 2018-05-17 NOTE — ASSESSMENT & PLAN NOTE
Will place on inpt ssi protocol   Sliding scale insulin   Diabetic diet  Nutrition consult   Substitute home long acting with lantus 15 untis at hs now passed  Elevated in last set of blood work but pt has not eaten at all most of the day per the family

## 2018-05-17 NOTE — CONSULTS
Progress Note - Wound   Shu Durbin 80 y o  female MRN: [id number]  Unit/Bed#: ED 15 Encounter: [id number]      Assessment:  Wound care consulted to see patient for multiple skin tears that were sustained from multiple falls per nursing  Patient admitted with lower extremity edema  Patient seen in ER  Continent of bowel and bladder per nursing, ambulates with rolling walker  Assist of one with turning  Nutrition consult pending     b/l buttocks, heels , and sacrum are intact with no redness or wounds noted  R buttock is intact with ecchymosis from falls  No open aspects noted  Recommend hydraguard cream      Ecchymosis and edema noted to b/l lower extremities, L worse than R  + pulses noted b/l pedally  Partial thickness skin tears with complete skin flap loss noted to b/l knees  No drainage noted  No s/s of infection noted  Lyn-wounds with ecchymosis  Reabsorbing dry intact serous blister noted to L anterior foot which is intact with no open aspects or drainage  Recommend skin nourishing cream or skin prep  Likely from fluid overload  L elbow full thickness skin tear  2 sutures noted in the wound bed proximally  Wound bed is moist mixed tissue 50% pink 50% yellow slough tissue  Complete skin flap loss noted  Edges attached intact with some scabbing noted  Lyn-wound intact with ecchymosis  Denies pain with assessment  Recommend dermagran to skin tears and DSD every other day  Also recommend preventative skin care plan  ER nurse aware of plan of care  See flow sheets for more detailed assessment findings  Wound care will follow  Plan:   1  Cleanse skin tears to b/l knees and L elbows with NSS, pat dry, apply dermagran gauze to wounds, cover with dry dressings and change every other day and as needed  2  Apply hydraguard to b/l heels, buttocks and sacrum BID and PRN for prevention and protection  3  Apply skin nourishing cream to the entire skin daily for moisture  4   Turn and reposition patient every 2 hours   5  Soft care cushion to chair when OOB   6  Elevate heels off of bed with pillows to offload pressure   7  Wound care will follow along with patient weekly, please call with questions and concerns     Objective:    Vitals: Blood pressure 132/60, pulse 86, temperature 97 6 °F (36 4 °C), temperature source Oral, resp  rate 16, weight 80 8 kg (178 lb 2 1 oz), SpO2 99 %, not currently breastfeeding  ,Body mass index is 29 64 kg/m²  Wound 05/17/18 Skin tear Knee Left lateral  (Active)   Wound Description Beefy red 5/17/2018 12:15 PM   Lyn-wound Assessment Dry; Intact;Fragile; Purple 5/17/2018 12:15 PM   Shape irregular  5/17/2018 12:15 PM   Wound Length (cm) 1 cm 5/17/2018 12:15 PM   Wound Width (cm) 0 5 cm 5/17/2018 12:15 PM   Wound Depth (cm) 0 1 5/17/2018 12:15 PM   Calculated Wound Volume (cm^3) 0 05 cm^3 5/17/2018 12:15 PM   Tunneling 0 cm 5/17/2018 12:15 PM   Undermining 0 5/17/2018 12:15 PM   Closure None 5/17/2018 12:15 PM   Drainage Amount None 5/17/2018 12:15 PM   Non-staged Wound Description Partial thickness 5/17/2018 12:15 PM   Treatments Cleansed;Site care 5/17/2018 12:15 PM   Dressing Gauze 5/17/2018 12:15 PM   Wound packed? No 5/17/2018 12:15 PM   Packing- # removed 0 5/17/2018 12:15 PM   Packing- # inserted 0 5/17/2018 12:15 PM   Dressing Changed New 5/17/2018 12:15 PM   Patient Tolerance Tolerated well 5/17/2018 12:15 PM   Dressing Status Clean;Dry; Intact 5/17/2018 12:15 PM       Wound 05/17/18 Other (Comment) Foot Anterior deflated blister  (Active)   Wound Description Dry; Intact;Fragile 5/17/2018 12:15 PM   Lyn-wound Assessment Clean;Dry; Intact 5/17/2018 12:15 PM   Shape oval 5/17/2018 12:15 PM   Wound Length (cm) 1 cm 5/17/2018 12:15 PM   Wound Width (cm) 2 3 cm 5/17/2018 12:15 PM   Wound Depth (cm) 0 5/17/2018 12:15 PM   Calculated Wound Volume (cm^3) 0 cm^3 5/17/2018 12:15 PM   Tunneling 0 cm 5/17/2018 12:15 PM   Undermining 0 5/17/2018 12:15 PM   Closure None 5/17/2018 12:15 PM   Drainage Amount None 5/17/2018 12:15 PM   Non-staged Wound Description Not applicable 8/52/6515 59:71 PM   Treatments Cleansed 5/17/2018 12:15 PM   Dressing Open to air 5/17/2018 12:15 PM   Wound packed? No 5/17/2018 12:15 PM   Packing- # removed 0 5/17/2018 12:15 PM   Packing- # inserted 0 5/17/2018 12:15 PM   Patient Tolerance Tolerated well 5/17/2018 12:15 PM   Dressing Status Open to air 5/17/2018 12:15 PM       Wound 05/17/18 Skin tear Knee Right (Active)   Wound Description Beefy red;Pink;Dry 5/17/2018 12:15 PM   Lyn-wound Assessment Clean;Dry; Intact 5/17/2018 12:15 PM   Shape oval 5/17/2018 12:15 PM   Wound Length (cm) 2 5 cm 5/17/2018 12:15 PM   Wound Width (cm) 3 cm 5/17/2018 12:15 PM   Wound Depth (cm) 0 1 5/17/2018 12:15 PM   Calculated Wound Volume (cm^3) 0 75 cm^3 5/17/2018 12:15 PM   Tunneling 0 cm 5/17/2018 12:15 PM   Undermining 0 5/17/2018 12:15 PM   Closure None 5/17/2018 12:15 PM   Drainage Amount None 5/17/2018 12:15 PM   Non-staged Wound Description Partial thickness 5/17/2018 12:15 PM   Treatments Cleansed;Site care 5/17/2018 12:15 PM   Dressing Gauze 5/17/2018 12:15 PM   Wound packed? No 5/17/2018 12:15 PM   Packing- # removed 0 5/17/2018 12:15 PM   Packing- # inserted 0 5/17/2018 12:15 PM   Dressing Changed New 5/17/2018 12:15 PM   Patient Tolerance Tolerated well 5/17/2018 12:15 PM   Dressing Status Clean;Dry; Intact 5/17/2018 12:15 PM       Wound 05/17/18 Skin tear Elbow Left (Active)   Wound Description Pink;Slough; Yellow 5/17/2018 12:15 PM   Lyn-wound Assessment Dry; Intact;Fragile 5/17/2018 12:15 PM   Shape irregular  5/17/2018 12:15 PM   Wound Length (cm) 5 cm 5/17/2018 12:15 PM   Wound Width (cm) 1 9 cm 5/17/2018 12:15 PM   Wound Depth (cm) 0 2 5/17/2018 12:15 PM   Calculated Wound Volume (cm^3) 1 9 cm^3 5/17/2018 12:15 PM   Tunneling 0 cm 5/17/2018 12:15 PM   Undermining 0 5/17/2018 12:15 PM   Closure None 5/17/2018 12:15 PM   Drainage Amount Scant 5/17/2018 12:15 PM Drainage Description Serosanguineous 5/17/2018 12:15 PM   Non-staged Wound Description Full thickness 5/17/2018 12:15 PM   Treatments Cleansed;Site care 5/17/2018 12:15 PM   Dressing Vaseline gauze;Dry dressing 5/17/2018 12:15 PM   Wound packed? No 5/17/2018 12:15 PM   Packing- # removed 0 5/17/2018 12:15 PM   Packing- # inserted 0 5/17/2018 12:15 PM   Dressing Changed New 5/17/2018 12:15 PM   Patient Tolerance Tolerated well 5/17/2018 12:15 PM   Dressing Status Clean;Dry; Intact 5/17/2018 12:15 PM       Recommendations written as orders  AVS updated    Ashley Pérez RN BSN

## 2018-05-17 NOTE — ASSESSMENT & PLAN NOTE
Due to significant metastatic disease and renal disease  appreciate oncology   Continue to trend at this point

## 2018-05-17 NOTE — ASSESSMENT & PLAN NOTE
Discussed with attending ok to continue on propholactic lovenox at this time   Ct head demonstrates: Frontal scalp hematoma with no underlying skull fracture    Will need to chk neuro chks q 4 hrs   Monitor if any drop in Hemoglobin   Monitor for mental status change and vitals

## 2018-05-17 NOTE — PHYSICIAN ADVISOR
Current patient class: Inpatient  The patient is currently on Hospital Day: 2      The patient was admitted to the hospital  on 5/16/18 at 2249 for the following diagnosis:  Unspecified multiple injuries, initial encounter [T07  XXXA]       There is documentation in the medical record of an expected length of stay of at least 2 midnights  The patient is therefore expected to satisfy the 2 midnight benchmark and given the 2 midnight presumption is appropriate for INPATIENT ADMISSION  Given this expectation of a satisfying stay, CMS instructs us that the patient is most often appropriate for inpatient admission under part A provided medical necessity is documented in the chart  After review of the relevant documentation, labs, vital signs and test results, the patient is appropriate for INPATIENT ADMISSION  Admission to the hospital as an inpatient is a complex decision making process which requires the practitioner to consider the patients presenting complaint, history and physical examination and all relevant testing  With this in mind, in this case, the patient was deemed appropriate for INPATIENT ADMISSION  After review of the documentation and testing available at the time of the admission I concur with this clinical determination of medical necessity  The patient does have an inpatient admission within the previous 30 days  The patient was admitted on 5/12/18 and discharged on 5/13/18 as an inpatient  Rationale is as follows: The patient is a 80 yrs   Female who presented to the ED at 5/16/2018  5:08 PM with a chief complaint of Fall (Per pt she fell and was not dizzy, pt poor historian  Pt hx of dementia  Pt has had 4 previous falls  )     Patient admitted with a complaint of possible syncope with a related fall  The consideration of a mechanical fall is present as the patient does have dementia and has a poor memory    The patient had a recent hospitalization for a similar event and was noted to have metastatic disease  On this admission imaging studies included a CXR which showed mottling of the osseous structures consistent with metastatic disease  A CT of the head, C-spine and abdomen/pelvis also revealed metastatic disease  It would seem that the falls are related to weakness due to her metastatic disease and it would be appropriate to consider this admission as RELATED to the previous admission  The patients vitals on arrival were ED Triage Vitals   Temperature Pulse Respirations Blood Pressure SpO2   05/16/18 1713 05/16/18 1713 05/16/18 1713 05/16/18 1713 05/16/18 1713   (!) 97 2 °F (36 2 °C) 100 18 (!) 140/109 98 %      Temp Source Heart Rate Source Patient Position - Orthostatic VS BP Location FiO2 (%)   05/17/18 0830 05/16/18 2027 05/16/18 2027 05/16/18 2027 --   Oral Monitor Lying Left arm       Pain Score       05/16/18 1713       No Pain           Past Medical History:   Diagnosis Date    Anemia     [facility]     [facility] [facility]     Diabetes mellitus [facility] [facility]     Hemochromatosis     Hypertension     Liver metastases [facility] [facility]      No past surgical history on file          Consults have been placed to:   IP CONSULT TO ONCOLOGY  IP CONSULT TO WOUND CARE  IP CONSULT TO NUTRITION SERVICES    Vitals:    05/17/18 0830 05/17/18 1127 05/17/18 1215 05/17/18 1357   BP: 115/61 110/70 132/60 134/65   BP Location:  Right arm  Right arm   Pulse: 86 86 86 85   Resp: 18 16 16 16   Temp: 97 6 °F (36 4 °C)      TempSrc: Oral      SpO2: 100% 100% 99% 99%   Weight:           Most recent labs:    Recent Labs      05/16/18   1848   05/17/18   0415  05/17/18   0711   WBC   --    --    --   1 52*   HGB   --    --    --   8 5*   HCT   --    --    --   24 7*   PLT   --    --    --   52*   K  4 6   --    --   4 2   NA  136   --    --   139   CALCIUM  9 7   --    --   9 4   BUN  51*   --    --   48*   CREATININE  2 45*   --    --   2 11*   TROPONINI  <0 02   < >  <0 02   --    AST  129*   -- --   102*   ALT  69   --    --   57   ALKPHOS  323*   --    --   250*   BILITOT  3 65*   --    --   2 99*    < > = values in this interval not displayed  Scheduled Meds:  Current Facility-Administered Medications:  cefTRIAXone 1,000 mg Intravenous Q24H KISHORE Naylor   enoxaparin 30 mg Subcutaneous Daily KISHORE Naylor   insulin lispro 1-5 Units Subcutaneous HS KISHORE Naylor   insulin lispro 1-6 Units Subcutaneous TID AC KISHORE Naylor     Facility-Administered Medications Ordered in Other Encounters:  denosumab 120 mg Subcutaneous Once Gala Barclay MD   fulvestrant 250 mg Intramuscular Once Gala Barclay MD   And       fulvestrant 250 mg Intramuscular Once Gala Barclay MD     Continuous Infusions:   PRN Meds:      Surgical procedures (if appropriate):

## 2018-05-17 NOTE — CASE MANAGEMENT
Initial Clinical Review    Admission: Date/Time/Statement: 5/16/18 @ 2249     Orders Placed This Encounter   Procedures    Inpatient Admission (expected length of stay for this patient is greater than two midnights)     Standing Status:   Standing     Number of Occurrences:   1     Order Specific Question:   Admitting Physician     Answer:   Lanie Jacob [[id number]]     Order Specific Question:   Level of Care     Answer:   Med Surg [[id number]]     Order Specific Question:   Estimated length of stay     Answer:   More than 2 Midnights     Order Specific Question:   Certification     Answer:   I certify that inpatient services are medically necessary for this patient for a duration of greater than two midnights  See H&P and MD Progress Notes for additional information about the patient's course of treatment  ED: Date/Time/Mode of Arrival:   ED Arrival Information     Expected Arrival Acuity Means of Arrival Escorted By Service Admission Type    - 5/16/2018 17:08 Emergent Ambulance [id number] Emergency    Arrival Complaint    fall          Chief Complaint:   Chief Complaint   Patient presents with    Fall     Per pt she fell and was not dizzy, pt poor historian  Pt hx of dementia  Pt has had 4 previous falls  History of Meenakshi Rooney is a 80 y o  female who presents with syncope/fall  Pt has a hx of metastatic breast cancer, hormone receptors positive and HER-2 negative since 2016,  type 2 dm on insulin, HTN  Tumor marker ca 27 29 came down but is now increased  PET/CT scan in feb 2017 showed improvement in the right breast and axilla area mass and lung nodules but increased disease in the bone  Repeat PET /CT in May of 2017 showed new abnormal foci in the liver, and diffuse uptake in the osseous structures  Small pulmonary nodules and right breast mass as well as right axillary lymph node  New therapy was initiated   Medications were soon thereafter readjusted due to pt experiencing increased fatigue, loss of appetite and lack of interest in activities  Plan ( 1/23/18 was to continue xgeva 120mg sq every 4 weeks, 100mg po daily 1 -21 every 28 days 500mg IM monthly )  As of 5/15/18 pts daughter called oncology in regards to pts schedule to obtain xgeva and faslodex at [facility], requesting  to cancel this appointment due to pt feeling weak and unable to come to appointment  When return call was made daughter discussed the fact that the pt had fallen week prior at the facility and had been taken to the hospital, where the doctors were unable to explain the pts cause of leg weakness  Pt fell again on the Saturday and had been discharged from the hospital      Now pt returns sp a new fall  Pt has been unable to provide an accurate history  Pt is reported to have dementia but pt was able to tell me she was at [facility] and that she fell but could not remember the circumstances surrounding the event  Family at bedside report only that the pt was found down by staff at the nursing home/assisted living  Pt did sustain a moderate cephalhematoma of the mid forehead  She has multiple ecchymotic area over entire body especially down left side of hip, anterior, lateral and posterior thigh with severe erythema/edema and ecchymosis to lower legs  Pt had also on prior hospital admission sustained a left elbow laceration that now was open and sutured by the er physician  Pt had sustained a documented "superficial skin tear measuring approximately 8 cm x 6 cm   In the center of this area of skin tear there are 2 small (0 8 cm x2) lacerations that are full-thickness through the subcutaneous fat with exposure of underlying fascia"  Ct of abdomen noted emphysematous cystitis and ed administered one time dose of ceftriaxone   Will continue to monitor and give abx due to immunosuppression, and families belief that pts acute physical endurance changes are due to acute infection          ED Vital Signs:   ED Triage Vitals   Temperature Pulse Respirations Blood Pressure SpO2   05/16/18 1713 05/16/18 1713 05/16/18 1713 05/16/18 1713 05/16/18 1713   (!) 97 2 °F (36 2 °C) 100 18 (!) 140/109 98 % RA sat       Temp Source Heart Rate Source Patient Position - Orthostatic VS BP Location FiO2 (%)   05/17/18 0830 05/16/18 2027 05/16/18 2027 05/16/18 2027 --   Oral Monitor Lying Left arm       Pain Score       05/16/18 1713       No Pain        Wt Readings from Last 1 Encounters:   05/16/18 80 8 kg (178 lb 2 1 oz)         Abnormal Labs/Diagnostic Test Results:    Ref Range & Units 5/17/18 0711 5/16/18 1726   WBC 4 31 - 10 16 Thousand/uL 1 52   3 15     RBC 3 81 - 5 12 Million/uL 2 23   2 88     Hemoglobin 11 5 - 15 4 g/dL 8 5   11 3     Hematocrit 34 8 - 46 1 % 24 7   32 5     MCV 82 - 98 fL 111   113     MCH 26 8 - 34 3 pg 38 1   39 2     MCHC 31 4 - 37 4 g/dL 34 4  34 8    RDW 11 6 - 15 1 % 17 2   17 4     MPV 8 9 - 12 7 fL 8 9  11 2    Platelets 950 - 073 Thousands/uL 52   99     nRBC /100 WBCs 0  1    Neutrophils Relative 43 - 75 % 51  62    Immat GRANS % 0 - 2 % 0  0    Lymphocytes Relative 14 - 44 % 36  26    Monocytes Relative 4 - 12 % 13   11    Eosinophils Relative 0 - 6 % 0  0    Basophils Relative 0 - 1 % 1  0    Neutrophils Absolute 1 85 - 7 62 Thousands/µL 0 77   1 96    Immature Grans Absolute 0 00 - 0 20 Thousand/uL 0 00  0 01    Lymphocytes Absolute 0 60 - 4 47 Thousands/µL 0 54   0 82    Monocytes Absolute 0 17 - 1 22 Thousand/µL 0 20  0 34    Eosinophils Absolute 0 00 - 0 61 Thousand/µL 0 00  0 01    Basophils Absolute 0 00 - 0 10 Thousands/µL 0 01  0 01               Ref Range & Units 5/17/18 0711 5/16/18 1848   Sodium 136 - 145 mmol/L 139  136    Potassium 3 5 - 5 3 mmol/L 4 2  4 6    Chloride 100 - 108 mmol/L 104  102    CO2 21 - 32 mmol/L 26  25    Anion Gap 4 - 13 mmol/L 9  9    BUN 5 - 25 mg/dL 48   51     Creatinine 0 60 - 1 30 mg/dL 2 11   2 45CM     Glucose 65 - 140 mg/dL 157   251CM Calcium 8 3 - 10 1 mg/dL 9 4  9 7    AST 5 - 45 U/L 102   129CM     ALT 12 - 78 U/L 57  69CM    Alkaline Phosphatase 46 - 116 U/L 250   323     Total Protein 6 4 - 8 2 g/dL 4 7   5 5     Albumin 3 5 - 5 0 g/dL 1 8   2 2     Total Bilirubin 0 20 - 1 00 mg/dL 2 99   3 65     eGFR ml/min/1 73sq m 20  17            HgA1C - 7 5/1 69  TSH 3rd gen  - 5760  Urine - leuk- small - Protein - trace - blood- mod     CXR - b/l atelectasis  Ct head - 1  No acute intracranial abnormality  2   Frontal scalp hematoma with no underlying skull fracture  Stable lytic skull metastases  CT Cervical Spine - No cervical spine fracture or traumatic malalignment  Diffuse osseous metastatic disease    Ct A & P -    1  Emphysematous cystitis  2   Worsening osseous metastatic disease throughout the axial skeleton with mild to moderate ascites in the abdomen and pelvis  3   Stable splenic lesions likely benign cysts and/or hemangiomas  Previously described liver metastases inconspicuous on this unenhanced study  VAS - le's duplex study - pending    ED Treatment:   Medication Administration from 05/16/2018 1707 to 05/17/2018 0950       Date/Time Order Dose Route Action     05/16/2018 2019 lidocaine (PF) (XYLOCAINE-MPF) 1 % injection 5 mL 5 mL Infiltration Given     05/16/2018 2252 cefTRIAXone (ROCEPHIN) IVPB (premix) 1,000 mg 1,000 mg Intravenous New Bag     05/17/2018 0817 insulin lispro (HumaLOG) 100 units/mL subcutaneous injection 1-6 Units 2 Units Subcutaneous Given     05/17/2018 0153 insulin lispro (HumaLOG) 100 units/mL subcutaneous injection 1-5 Units 2 Units Subcutaneous Given       Past Medical/Surgical History:   Diagnosis    Anemia    Bone metastases ([address]    [facility] [address] [facility]    Diabetes mellitus ([address]    Hemochromatosis    Hypertension    Liver metastases ([facility] [address] [facility]       Admitting Diagnosis: Unspecified multiple injuries, initial encounter [T07  XXXA]    Age/Sex: 80 y o  female    Assessment/Plan:     Syncope Assessment & Plan     Suspected to be mechanical however pt found down and pt has no recollection of fall details  - pt was just here for prior fall   - will have PT/OT see pt for higher level of care already being discussed at the assisted living pt is at now  - will need to chk orthos  - continue to trend three sets of troponin's thus far first set negative   - if increased would consider cards input   - pt reports no symptoms no chest pain no chest tightness dizziness of lightheadedness   - foot pumps to lower extremities in light of trauma to lower extremities significant ecchymosis and some open areas          Bone metastasis ([address] )   Assessment & Plan     Call just made to  outpt to oncology this week  per the pts daughter in regards to pts scheduled Xgeva and faslodex injections that they could not make it as the pt was feeling too weak       Will consult oncology for their input (pt usually see Dr Igor Contreras) last seen in office on 1/23/18 but has had multiple injection/infusions since then  Pt was seen in office at that time regarding hormone receptor positive breast cancer, stage iv  She was noted to be tolerating her ibrance, faslodex and xgeva well with no complaints  Ca 27 29 from January 2018 noted to be 1717 7 and in December 2017,  1482  2  This admission noted on 5/11/18 to be  2283 4  Will leave to oncology further treatment or plans to be discussed with the pt  ?  Possible need for MRI of brain since head ct demonstrates mets to skull  Ct head demonstrates: 1   No acute intracranial abnormality     2   Frontal scalp hematoma with no underlying skull fracture   Stable lytic skull metastases      Will initiate dvt proph due to high risk pt but will need to monitor neurological status pt with superficial head lac and recent trauma to head and entire body                   Liver metastases ([address] )   Assessment & Plan     Pt undergoing treatment as previously mentioned   With pancytopenia and with transaminitis   Will continue to trend but suspect progression of metastatic disease (will leave to oncology for further evaluation and discussion )   Ct abdomen and pelvis wo contrast (in light of renal disease) 1   Emphysematous cystitis  2   Worsening osseous metastatic disease throughout the axial skeleton with mild to moderate ascites in the abdomen and pelvis  3   Stable splenic lesions likely benign cysts and/or hemangiomas   Previously described liver metastases inconspicuous on this unenhanced study      chk coag panel                Head contusion   Assessment & Plan     Discussed with attending ok to continue on propholactic lovenox at this time   Ct head demonstrates: Frontal scalp hematoma with no underlying skull fracture    Will need to chk neuro chks q 4 hrs   Monitor if any drop in Hemoglobin   Monitor for mental status change and vitals           Type 2 diabetes mellitus without complication, with long-term current use of insulin (HCC)   Assessment & Plan     Will place on inpt ssi protocol   Sliding scale insulin   Diabetic diet  Nutrition consult   Substitute home long acting with lantus 15 untis at hs now passed  Elevated in last set of blood work but pt has not eaten at all most of the day per the family              Bilateral lower extremity edema   Assessment & Plan     Severe bl lower extremity pitting edema in pt with hx of metastatic cancer and hypalbuminemia  - prior admission venous duplex performed negative for dvt   - however now with significant new trauma to bl lower extremities   - elevation and wound care in vulnerable pt   - nutrition consult           Pancytopenia ([address] )   Assessment & Plan     Due to significant metastatic disease and renal disease  appreciate oncology   Continue to trend at this point           CKD (chronic kidney disease)   Assessment & Plan     Pt renal function is chronic  Will chk bladder scan in light of pts recent ct   Pt reports no urinary symptoms (no burning , frequency, fevers or chills)    Pt reports no pain with palpation   Baseline creatinine appears to be around 1 9 -2 0  (now 2 45)  Will hold off on ivf at this time   Encourage po hydration and nutrition   Avoid nephrotoxic meds   Strict I&Os        Ct of abdomen noted emphysematous cystitis and ed administered one time dose of ceftriaxone  Will continue to monitor and give abx due to immunosuppression, and families belief that pts acute physical endurance changes are due to acute infection         Admission Orders:  Scheduled Meds:   Current Facility-Administered Medications:  cefTRIAXone 1,000 mg Intravenous Q24H   enoxaparin 30 mg Subcutaneous Daily   insulin lispro 1-5 Units Subcutaneous HS   insulin lispro 1-6 Units Subcutaneous TID AC   tbo-filgrastim 300 mcg Subcutaneous Once       Nursing orders-  Telem - ortho BP q shift - Daily weights - I &O q shift - Straight cath x 1 - Neuro checks q 4    Oncology consult- Assessment/Plan:  1  Stage IV hormone receptor +, her 2 negative breast cancer metastatic to lungs, liver, bone - no significant advancement of metastatic disease noted on current imaging done during hospitalization  Would recommend repeat PET/CT outpatient for further clarification if prior masses are worsened in comparison to previous PET scan in May 2017  Most recent CA 27 29 2283 4, continues to increase from 2057 6 in 3/15/18  Pt to follow up with Dr Fauzia Irwin outpatient  Recommend ongoing discussion of patient's goals of care, including treatment-directed care versus comfort-directed care  2   Pancytopenia - provided granix 300mcg today, stopped Ibrance as may be contributing

## 2018-05-17 NOTE — ASSESSMENT & PLAN NOTE
- Severe B/L lower extremity pitting edema in pt with hx of metastatic cancer and hypalbuminemia  - Prior admission venous duplex performed negative for dvt   - however now with significant new trauma to bl lower extremities   - elevation and wound care in vulnerable pt   - nutrition consult   -avoid diuretic stable, use compression if supportive care feels improved edema

## 2018-05-17 NOTE — ASSESSMENT & PLAN NOTE
Pt undergoing treatment as previously mentioned   With pancytopenia and with transaminitis   Will continue to trend but suspect progression of metastatic disease (will leave to oncology for further evaluation and discussion )   Ct abdomen and pelvis wo contrast (in light of renal disease) 1   Emphysematous cystitis  2   Worsening osseous metastatic disease throughout the axial skeleton with mild to moderate ascites in the abdomen and pelvis  3   Stable splenic lesions likely benign cysts and/or hemangiomas   Previously described liver metastases inconspicuous on this unenhanced study      chk coag panel

## 2018-05-17 NOTE — CONSULTS
Oncology Consult Note  Jaime Albright 80 y o  female MRN: [id number]  Unit/Bed#: ED 13 Encounter: [id number]      Presenting Complaint: fall, syncope, metastatic breast CA    History of Presenting Illness:  61-year-old female with  PMH significant for insulin dependent T2DM and chronic bilateral lower extremity edema with history of metastatic breast cancer, hormone receptors + and HER 2 negative presents after a fall with syncope  She was found down by the nursing home staff at the assisted living she lives at, found to have a moderate cephalohematoma of her mid forehead and scattered ecchymoses across her entire body  Since April 2016 she has been on Ryan for metastatic right breast cancer to her right axillary lymph nodes, lungs, and bones  Initially she responded well and her tumor marker CA 27 29 came down in March 2016 from 1455-300 by December 2016  By January 2017, however, it increased back to 532, and PET/CT scan in February 2017 while showing improvement in the right breast, axilla, and lung nodules, did show increased disease burden in her bones  By May 2017, are repeat CA 27 29 was > 450 and repeat PET/CT scan showed abnormal foci within the liver, with some new foci of uptake within osseous structures, but again stable small pulmonary nodules, right breast mass, and right axillary lymph node  Femara was discontinued at that time, Faslodex and Lujean Ledger were started, and Xgeva continued  By June 2017, dose of I brands was decreased due to fatigue and loss of appetite  Pt has had increase in her CA 27 29 increased to 1482 2 in Dec 2017, and then 1717 7 in Jan 2018    Review of Systems - As stated in the HPI otherwise the fourteen point review of systems was negative      Past Medical History:   Diagnosis Date    Anemia     Bone metastases ([address]     Breast cancer ([address]     Diabetes mellitus ([address] )     Hemochromatosis     Hypertension     Liver metastases ([address] )        Social History     Social History    Marital status:      Spouse name: N/A    Number of children: N/A    Years of education: N/A     Social History Main Topics    Smoking status: Never Smoker    Smokeless tobacco: Never Used    Alcohol use No    Drug use: No    Sexual activity: Not on file     Other Topics Concern    Not on file     Social History Narrative    No narrative on file       No family history on file  No Known Allergies      Current Facility-Administered Medications:     cefTRIAXone (ROCEPHIN) IVPB (premix) 1,000 mg, 1,000 mg, Intravenous, Q24H, KISHORE Schroeder    enoxaparin (LOVENOX) subcutaneous injection 30 mg, 30 mg, Subcutaneous, Daily, KISHORE Schroeder, Stopped at 05/17/18 0848    filgrastim (NEUPOGEN) 300 mcg in dextrose 5% 10 mL IV syringe, 300 mcg, Intravenous, Once, Maynor Resendiz DO    insulin lispro (HumaLOG) 100 units/mL subcutaneous injection 1-5 Units, 1-5 Units, Subcutaneous, HS, KISHORE Schroeder, 2 Units at 05/17/18 0153    insulin lispro (HumaLOG) 100 units/mL subcutaneous injection 1-6 Units, 1-6 Units, Subcutaneous, TID AC, 2 Units at 05/17/18 0817 **AND** Fingerstick Glucose (POCT), , , TID AC, KISHORE Schroeder    Current Outpatient Prescriptions:     amLODIPine (NORVASC) 2 5 mg tablet, Take 2 5 mg by mouth daily  , Disp: , Rfl:     aspirin (ECOTRIN LOW STRENGTH) 81 mg EC tablet, Take 81 mg by mouth daily, Disp: , Rfl:     Calcium Carbonate-Vitamin D3 (CALCIUM 600/VITAMIN D) 600-400 MG-UNIT TABS, Take 1 tablet by mouth daily, Disp: , Rfl:     cyanocobalamin (VITAMIN B-12) 1,000 mcg tablet, Take 1,000 mcg by mouth daily, Disp: , Rfl:     glucose 4 g chewable tablet, Chew as needed for low blood sugar, Disp: , Rfl:     insulin aspart (NovoLOG) 100 units/mL injection, Inject under the skin 3 (three) times a day before meals Sliding scale , Disp: , Rfl:     insulin degludec (TRESIBA FLEXTOUCH) injection pen 100 units/mL, Inject 15 Units under the skin daily at bedtime, Disp: , Rfl:     palbociclib (IBRANCE) 100 MG capsule, Take 1 capsule (100 mg total) by mouth daily x 21 days then off for 7 days, Disp: 21 capsule, Rfl: 1    dextromethorphan-guaiFENesin (ROBAFEN DM)  mg/5 mL oral syrup, Take 5 mL by mouth as needed, Disp: , Rfl:     Facility-Administered Medications Ordered in Other Encounters:     denosumab (XGEVA) subcutaneous injection 120 mg, 120 mg, Subcutaneous, Once, Hal Ramsey MD    fulvestrant (FASLODEX) IM injection 250 mg, 250 mg, Intramuscular, Once **AND** fulvestrant (FASLODEX) IM injection 250 mg, 250 mg, Intramuscular, Once, Jose MD Julia      /61   Pulse 86   Temp 97 6 °F (36 4 °C) (Oral)   Resp 18   Wt 80 8 kg (178 lb 2 1 oz)   LMP  (LMP Unknown)   SpO2 100%   BMI 29 64 kg/m²       General Appearance:    Alert, oriented x 3       Eyes:    PERRL   Ears:    Normal external ear canals, both ears   Nose:   Nares normal, septum midline   Throat:   Mucosa moist  Pharynx without injection  Neck:   Supple       Lungs:     Clear to auscultation bilaterally   Chest Wall:    No tenderness or deformity    Heart:    Regular rate and rhythm       Abdomen:     Soft, non-tender, bowel sounds +, no organomegaly           Extremities:   2+ pitting edema from feet to just distal to knees b/l, diffuse ecchymosis of LE's b/l, scattered smaller ecchymoses of UEs and moderate cephalohematoma of mid-forehead       Skin:   no rash or icterus      Lymph nodes:   Cervical, supraclavicular, and axillary nodes normal   Neurologic:   CNII-XII intact, normal strength, sensation and reflexes     Throughout               Recent Results (from the past 48 hour(s))   CBC and differential    Collection Time: 05/16/18  5:26 PM   Result Value Ref Range    WBC 3 15 (L) 4 31 - 10 16 Thousand/uL    RBC 2 88 (L) 3 81 - 5 12 Million/uL    Hemoglobin 11 3 (L) 11 5 - 15 4 g/dL    Hematocrit 32 5 (L) 34 8 - 46 1 %     (H) 82 - 98 fL    MCH 39  2 (H) 26 8 - 34 3 pg    MCHC 34 8 31 4 - 37 4 g/dL    RDW 17 4 (H) 11 6 - 15 1 %    MPV 11 2 8 9 - 12 7 fL    Platelets 99 (L) 528 - 390 Thousands/uL    nRBC 1 /100 WBCs    Neutrophils Relative 62 43 - 75 %    Immat GRANS % 0 0 - 2 %    Lymphocytes Relative 26 14 - 44 %    Monocytes Relative 11 4 - 12 %    Eosinophils Relative 0 0 - 6 %    Basophils Relative 0 0 - 1 %    Neutrophils Absolute 1 96 1 85 - 7 62 Thousands/µL    Immature Grans Absolute 0 01 0 00 - 0 20 Thousand/uL    Lymphocytes Absolute 0 82 0 60 - 4 47 Thousands/µL    Monocytes Absolute 0 34 0 17 - 1 22 Thousand/µL    Eosinophils Absolute 0 01 0 00 - 0 61 Thousand/µL    Basophils Absolute 0 01 0 00 - 0 10 Thousands/µL   Comprehensive metabolic panel    Collection Time: 05/16/18  6:48 PM   Result Value Ref Range    Sodium 136 136 - 145 mmol/L    Potassium 4 6 3 5 - 5 3 mmol/L    Chloride 102 100 - 108 mmol/L    CO2 25 21 - 32 mmol/L    Anion Gap 9 4 - 13 mmol/L    BUN 51 (H) 5 - 25 mg/dL    Creatinine 2 45 (H) 0 60 - 1 30 mg/dL    Glucose 251 (H) 65 - 140 mg/dL    Calcium 9 7 8 3 - 10 1 mg/dL     (H) 5 - 45 U/L    ALT 69 12 - 78 U/L    Alkaline Phosphatase 323 (H) 46 - 116 U/L    Total Protein 5 5 (L) 6 4 - 8 2 g/dL    Albumin 2 2 (L) 3 5 - 5 0 g/dL    Total Bilirubin 3 65 (H) 0 20 - 1 00 mg/dL    eGFR 17 ml/min/1 73sq m   Troponin I    Collection Time: 05/16/18  6:48 PM   Result Value Ref Range    Troponin I <0 02 <=0 04 ng/mL   Folate    Collection Time: 05/16/18  6:48 PM   Result Value Ref Range    Folate 13 8 3 1 - 17 5 ng/mL   Vitamin B12    Collection Time: 05/16/18  6:48 PM   Result Value Ref Range    Vitamin B-12 >6,000 (H) 100 - 900 pg/mL   POCT urinalysis dipstick    Collection Time: 05/16/18 10:42 PM   Result Value Ref Range    Color, UA Magui     Clarity, UA clear    ED Urine Macroscopic    Collection Time: 05/16/18 11:03 PM   Result Value Ref Range    Color, UA Brown     Clarity, UA Cloudy     pH, UA 5 5 4 5 - 8 0    Leukocytes, UA Small (A) Negative    Nitrite, UA Negative Negative    Protein, UA Trace (A) Negative mg/dl    Glucose, UA Negative Negative mg/dl    Ketones, UA Negative Negative mg/dl    Urobilinogen, UA 4 0 (A) 0 2, 1 0 E U /dl E U /dl    Bilirubin, UA Interference- unable to analyze (A) Negative    Blood, UA Moderate (A) Negative    Specific Gravity, UA 1 020 1 003 - 1 030   Urine Microscopic    Collection Time: 05/16/18 11:03 PM   Result Value Ref Range    RBC, UA None Seen None Seen, 0-5 /hpf    WBC, UA 10-20 (A) None Seen, 0-5, 5-55, 5-65 /hpf    Epithelial Cells None Seen None Seen, Occasional /hpf    Bacteria, UA Occasional None Seen, Occasional /hpf    Hyaline Casts, UA None Seen None Seen /lpf   Troponin I    Collection Time: 05/17/18 12:56 AM   Result Value Ref Range    Troponin I <0 02 <=0 04 ng/mL   Fingerstick Glucose (POCT)    Collection Time: 05/17/18  1:07 AM   Result Value Ref Range    POC Glucose 255 (H) 65 - 140 mg/dl   Troponin I    Collection Time: 05/17/18  4:15 AM   Result Value Ref Range    Troponin I <0 02 <=0 04 ng/mL   TSH, 3rd generation    Collection Time: 05/17/18  7:11 AM   Result Value Ref Range    TSH 3RD GENERATON 5 760 (H) 0 358 - 3 740 uIU/mL   Comprehensive metabolic panel    Collection Time: 05/17/18  7:11 AM   Result Value Ref Range    Sodium 139 136 - 145 mmol/L    Potassium 4 2 3 5 - 5 3 mmol/L    Chloride 104 100 - 108 mmol/L    CO2 26 21 - 32 mmol/L    Anion Gap 9 4 - 13 mmol/L    BUN 48 (H) 5 - 25 mg/dL    Creatinine 2 11 (H) 0 60 - 1 30 mg/dL    Glucose 157 (H) 65 - 140 mg/dL    Calcium 9 4 8 3 - 10 1 mg/dL     (H) 5 - 45 U/L    ALT 57 12 - 78 U/L    Alkaline Phosphatase 250 (H) 46 - 116 U/L    Total Protein 4 7 (L) 6 4 - 8 2 g/dL    Albumin 1 8 (L) 3 5 - 5 0 g/dL    Total Bilirubin 2 99 (H) 0 20 - 1 00 mg/dL    eGFR 20 ml/min/1 73sq m   Hemoglobin A1C    Collection Time: 05/17/18  7:11 AM   Result Value Ref Range    Hemoglobin A1C 7 5 (H) 4 2 - 6 3 %     mg/dl Magnesium    Collection Time: 05/17/18  7:11 AM   Result Value Ref Range    Magnesium 2 1 1 6 - 2 6 mg/dL   CBC and differential    Collection Time: 05/17/18  7:11 AM   Result Value Ref Range    WBC 1 52 (LL) 4 31 - 10 16 Thousand/uL    RBC 2 23 (L) 3 81 - 5 12 Million/uL    Hemoglobin 8 5 (L) 11 5 - 15 4 g/dL    Hematocrit 24 7 (L) 34 8 - 46 1 %     (H) 82 - 98 fL    MCH 38 1 (H) 26 8 - 34 3 pg    MCHC 34 4 31 4 - 37 4 g/dL    RDW 17 2 (H) 11 6 - 15 1 %    MPV 8 9 8 9 - 12 7 fL    Platelets 52 (L) 221 - 390 Thousands/uL    nRBC 0 /100 WBCs    Neutrophils Relative 51 43 - 75 %    Immat GRANS % 0 0 - 2 %    Lymphocytes Relative 36 14 - 44 %    Monocytes Relative 13 (H) 4 - 12 %    Eosinophils Relative 0 0 - 6 %    Basophils Relative 1 0 - 1 %    Neutrophils Absolute 0 77 (L) 1 85 - 7 62 Thousands/µL    Immature Grans Absolute 0 00 0 00 - 0 20 Thousand/uL    Lymphocytes Absolute 0 54 (L) 0 60 - 4 47 Thousands/µL    Monocytes Absolute 0 20 0 17 - 1 22 Thousand/µL    Eosinophils Absolute 0 00 0 00 - 0 61 Thousand/µL    Basophils Absolute 0 01 0 00 - 0 10 Thousands/µL   Fingerstick Glucose (POCT)    Collection Time: 05/17/18  7:36 AM   Result Value Ref Range    POC Glucose 193 (H) 65 - 140 mg/dl         Ct Abdomen Pelvis Wo Contrast    Result Date: 5/16/2018  Narrative: CT ABDOMEN AND PELVIS WITHOUT IV CONTRAST INDICATION:   transaminitis, hyperbilirubinemia  History of metastatic breast carcinoma  COMPARISON: CT abdomen pelvis 3/5/2016 TECHNIQUE:  CT examination of the abdomen and pelvis was performed without intravenous contrast   Axial, sagittal, and coronal 2D reformatted images were created from the source data and submitted for interpretation  Radiation dose length product (DLP) for this visit:  989 34 mGy-cm     This examination, like all CT scans performed in the [facility], was performed utilizing techniques to minimize radiation dose exposure, including the use of iterative reconstruction and automated exposure control  Enteric contrast was not administered  FINDINGS: ABDOMEN LOWER CHEST:  Tiny left pleural effusion with scattered paraseptal fibrosis at both lung bases  LIVER/BILIARY TREE:  The metastatic lesions described on most recent PET/CT inconspicuous without intravenous contrast   Mild perihepatic ascites  No biliary ductal dilatation  GALLBLADDER:  No calcified gallstones  No pericholecystic inflammatory change  SPLEEN:  Stable cystic lesions throughout the spleen  PANCREAS:  Unremarkable  ADRENAL GLANDS:  Unremarkable  KIDNEYS/URETERS:  Unremarkable  No hydronephrosis  STOMACH AND BOWEL:  There is colonic diverticulosis without evidence of acute diverticulitis  APPENDIX:  No findings to suggest appendicitis  ABDOMINOPELVIC CAVITY:  Mild to moderate ascites in the abdomen and pelvis  No pneumoperitoneum  No pathologic lymphadenopathy  VESSELS:  Atherosclerotic changes are present  No evidence of aneurysm  PELVIS REPRODUCTIVE ORGANS:  Unremarkable for patient's age  URINARY BLADDER:  There is air throughout the bladder wall with a large air-fluid level at the dome concerning for emphysematous cystitis  No pericystic inflammation  ABDOMINAL WALL/INGUINAL REGIONS:  Unremarkable  OSSEOUS STRUCTURES:  Diffuse lytic and blastic metastases throughout the axial skeleton which have progressed since 2016  Impression: 1  Emphysematous cystitis  2   Worsening osseous metastatic disease throughout the axial skeleton with mild to moderate ascites in the abdomen and pelvis  3   Stable splenic lesions likely benign cysts and/or hemangiomas  Previously described liver metastases inconspicuous on this unenhanced study  I personally discussed this study with ER Resident Esperanza Hand on 5/16/2018 at 10:02 PM  Workstation performed: [id number]     X-ray Chest 2 Views    Result Date: 5/16/2018  Narrative: CHEST INDICATION:   chest pain   COMPARISON:  None EXAM PERFORMED/VIEWS:  XR CHEST PA & LATERAL FINDINGS: Cardiomediastinal silhouette appears unremarkable  Bibasilar atelectasis is present  No pleural effusions or pneumothorax  Diffuse mottled appearance of the osseous structures is in keeping with known metastatic disease  Impression: Bibasilar atelectasis  Workstation performed: [id number]     Xr Chest 2 Views    Result Date: 5/7/2018  Narrative: CHEST INDICATION:   Shortness of breath  COMPARISON:  3/20/2017 and 3/18/2018  Chest CT dated 3/5/2016  EXAM PERFORMED/VIEWS:  XR CHEST PA & LATERAL FINDINGS: Cardiomediastinal silhouette appears unremarkable  Basilar interstitial reticulation, progressively, increasing steadily over time since 2015  Right diaphragmatic eventration  No focal consolidation, pneumothorax, or effusion  Mottled mineralization of the visualized axial skeleton consistent with known metastatic disease  Chronic wedging deformity of T7  Impression: 1  Chronic, but progressively increasing basilar interstitial process  Differential considerations interstitial fibrotic disease such as IPF, lymphangitic carcinomatosis, or recurrent edema  2   No pneumonic consolidation  3   Scattered skeletal metastases  Workstation performed: [id number]     Xr Hip/pelv 2-3 Vws Left    Result Date: 5/7/2018  Narrative: LEFT HIP INDICATION:   fall  COMPARISON:  PET CT 5/31/2017  VIEWS:  XR HIP/PELV 2-3 VWS LEFT  W PELVIS IF PERFORMED FINDINGS: There is no acute fracture or dislocation  No significant hip degenerative changes  No lytic or blastic osseous lesions  There are atherosclerotic calcifications  Soft tissues are otherwise unremarkable  The visualized lumbar spine is unremarkable  Impression: No acute osseous abnormality  Workstation performed: [id number]     Ct Head Without Contrast    Result Date: 5/16/2018  Narrative: CT BRAIN - WITHOUT CONTRAST INDICATION:   fall on ASA  History metastatic breast cancer   COMPARISON:  5/7/2018 TECHNIQUE:  CT examination of the brain was performed  In addition to axial images, coronal 2D reformatted images were created and submitted for interpretation  Radiation dose length product (DLP) for this visit:  992 91 mGy-cm   This examination, like all CT scans performed in the [facility], was performed utilizing techniques to minimize radiation dose exposure, including the use of iterative  reconstruction and automated exposure control  IMAGE QUALITY:  Diagnostic  FINDINGS: PARENCHYMA: Decreased attenuation is noted in periventricular and subcortical white matter demonstrating an appearance that is statistically most likely to represent mild microangiopathic change  No CT signs of acute infarction  No intracranial mass, mass effect or midline shift  No acute parenchymal hemorrhage  VENTRICLES AND EXTRA-AXIAL SPACES:  Normal for the patient's age  VISUALIZED ORBITS AND PARANASAL SINUSES:  Unremarkable  CALVARIUM AND EXTRACRANIAL SOFT TISSUES:  A frontal scalp hematoma in the midline identified with no underlying skull fracture  Multiple lytic lesions of the skull stable consistent with known metastatic breast carcinoma  Impression: 1  No acute intracranial abnormality  2   Frontal scalp hematoma with no underlying skull fracture  Stable lytic skull metastases  Workstation performed: [id number]     Ct Head Without Contrast    Addendum Date: 5/7/2018 Addendum:   ADDENDUM: 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 were not mentioned in the initial report but are present  Patient has known metastatic breast cancer  Result Date: 5/7/2018  Narrative: CT BRAIN - WITHOUT CONTRAST INDICATION:   Fall  COMPARISON:  3/28/2017 TECHNIQUE:  CT examination of the brain was performed  In addition to axial images, coronal 2D reformatted images were created and submitted for interpretation  Radiation dose length product (DLP) for this visit:  1100 mGy-cm     This examination, like all CT scans performed in the [facility], was performed utilizing techniques to minimize radiation dose exposure, including the use of iterative reconstruction and automated exposure control  IMAGE QUALITY:  Diagnostic  FINDINGS: PARENCHYMA: Mild confluent periventricular white matter hypodensity consistent with chronic microangiopathic changes  No change from prior  No acute infarct  No parenchymal hemorrhage  No mass  VENTRICLES AND EXTRA-AXIAL SPACES: Stable prominence of the subarachnoid spaces overlying the upper lobes  Mild involutional volume loss  No hydrocephalus, herniation, or mass effect  No extra-axial or intraventricular hemorrhage  VISUALIZED ORBITS AND PARANASAL SINUSES:  Prior right lens replacement surgery  Otherwise, normal orbits  Mild mucosal disease in the sphenoid sinuses  No acute air-fluid levels, though  No mastoid effusions  CALVARIUM AND EXTRACRANIAL SOFT TISSUES:  Osteopenia  No displaced or depressed skull fractures  Distal vertebral and cavernous carotid atherosclerotic calcifications  No scalp swelling  Impression: No acute intracranial abnormality or significant change from prior  Workstation performed: [id number]     Ct Cervical Spine Without Contrast    Result Date: 5/16/2018  Narrative: CT CERVICAL SPINE - WITHOUT CONTRAST INDICATION:   fall on ASA  History metastatic breast carcinoma  COMPARISON: None  TECHNIQUE:  CT examination of the cervical spine was performed without intravenous contrast   Contiguous axial images were obtained  Sagittal and coronal reconstructions were performed  Radiation dose length product (DLP) for this visit:  349 51 mGy-cm   This examination, like all CT scans performed in the [facility], was performed utilizing techniques to minimize radiation dose exposure, including the use of iterative  reconstruction and automated exposure control  IMAGE QUALITY:  Diagnostic  FINDINGS: ALIGNMENT:  Normal alignment of the cervical spine  No subluxation  VERTEBRAL BODIES:  No fracture  There are osteolytic lesions throughout the mottled spine consistent with known metastatic breast carcinoma  DEGENERATIVE CHANGES:  Mild multilevel cervical degenerative changes are noted without critical central canal stenosis  PREVERTEBRAL AND PARASPINAL SOFT TISSUES:  Unremarkable  THORACIC INLET:  Normal      Impression: No cervical spine fracture or traumatic malalignment  Diffuse osseous metastatic disease  Workstation performed: [id number]     Vas Lower Limb Venous Duplex Study, Complete Bilateral    Result Date: 5/8/2018  Narrative:  [facility] REPORT CLINICAL: Indications: Bilateral Swelling of Limb [R22 4]  Patient presents with bilateral leg swelling with pitting edema  Patient has no complaints  H/o diabetes  Risk Factors: The patient has history of hypercoagulable state and previous smoking (quit >10yrs ago)  She has no history of obesity, DVT, limb trauma, Pregnancy or malignancy  Clinical:Left Lower Limb There is edema  Right Lower Limb There is edema  FINDINGS:  Segment  Right            Left              Impression       Impression       CFV      Normal (Patent)  Normal (Patent)     CONCLUSION:  Impression: RIGHT LOWER LIMB: No evidence of acute or chronic deep vein thrombosis  No evidence of superficial thrombophlebitis noted  Doppler evaluation shows a normal response to augmentation maneuvers  Popliteal, posterior tibial and anterior tibial arterial Doppler waveforms are hyperemic  LEFT LOWER LIMB: No evidence of acute or chronic deep vein thrombosis  No evidence of superficial thrombophlebitis noted  Doppler evaluation shows a normal response to augmentation maneuvers  Popliteal, posterior tibial and anterior tibial arterial Doppler waveforms are hyperemic  Tech Note: Difficulty visualizing peroneal veins  SIGNATURE: Electronically Signed by: Neeraj Ramirez on 2018-05-08 05:12:32 AM      Assessment/Plan:  1    Stage IV hormone receptor +, her 2 negative breast cancer metastatic to lungs, liver, bone - no significant advancement of metastatic disease noted on current imaging done during hospitalization  Would recommend repeat PET/CT outpatient for further clarification if prior masses are worsened in comparison to previous PET scan in May 2017  Most recent CA 27 29 2283 4, continues to increase from 2057 6 in 3/15/18  Pt to follow up with Dr Rayshawn Reyes outpatient  Recommend ongoing discussion of patient's goals of care, including treatment-directed care versus comfort-directed care  2   Pancytopenia - provided granix 300mcg today, stopped Ibrance as may be contributing     D/W Dr Mile Bazan

## 2018-05-18 PROBLEM — E83.119 HEMOCHROMATOSIS: Status: ACTIVE | Noted: 2017-08-08

## 2018-05-18 PROBLEM — S80.11XA CONTUSION OF LOWER LEG, RIGHT: Status: ACTIVE | Noted: 2017-06-25

## 2018-05-18 PROBLEM — D53.9 ANEMIA, DEFICIENCY: Status: ACTIVE | Noted: 2017-06-13

## 2018-05-18 PROBLEM — T14.8XXA CONTUSION: Status: ACTIVE | Noted: 2017-06-25

## 2018-05-18 LAB
ALBUMIN SERPL BCP-MCNC: 1.7 G/DL (ref 3.5–5)
ALP SERPL-CCNC: 245 U/L (ref 46–116)
ALT SERPL W P-5'-P-CCNC: 54 U/L (ref 12–78)
ANION GAP SERPL CALCULATED.3IONS-SCNC: 7 MMOL/L (ref 4–13)
APTT PPP: 31 SECONDS (ref 24–36)
AST SERPL W P-5'-P-CCNC: 103 U/L (ref 5–45)
ATRIAL RATE: 100 BPM
BILIRUB SERPL-MCNC: 3.26 MG/DL (ref 0.2–1)
BUN SERPL-MCNC: 44 MG/DL (ref 5–25)
CALCIUM SERPL-MCNC: 9 MG/DL (ref 8.3–10.1)
CHLORIDE SERPL-SCNC: 106 MMOL/L (ref 100–108)
CO2 SERPL-SCNC: 25 MMOL/L (ref 21–32)
CREAT SERPL-MCNC: 1.86 MG/DL (ref 0.6–1.3)
ERYTHROCYTE [DISTWIDTH] IN BLOOD BY AUTOMATED COUNT: 17.8 % (ref 11.6–15.1)
GFR SERPL CREATININE-BSD FRML MDRD: 23 ML/MIN/1.73SQ M
GLUCOSE SERPL-MCNC: 145 MG/DL (ref 65–140)
GLUCOSE SERPL-MCNC: 180 MG/DL (ref 65–140)
GLUCOSE SERPL-MCNC: 183 MG/DL (ref 65–140)
GLUCOSE SERPL-MCNC: 205 MG/DL (ref 65–140)
GLUCOSE SERPL-MCNC: 212 MG/DL (ref 65–140)
GLUCOSE SERPL-MCNC: 218 MG/DL (ref 65–140)
GLUCOSE SERPL-MCNC: 235 MG/DL (ref 65–140)
HCT VFR BLD AUTO: 29.5 % (ref 34.8–46.1)
HGB BLD-MCNC: 10.2 G/DL (ref 11.5–15.4)
INR PPP: 1.37 (ref 0.86–1.17)
MCH RBC QN AUTO: 39.2 PG (ref 26.8–34.3)
MCHC RBC AUTO-ENTMCNC: 34.6 G/DL (ref 31.4–37.4)
MCV RBC AUTO: 114 FL (ref 82–98)
P AXIS: 36 DEGREES
PLATELET # BLD AUTO: 55 THOUSANDS/UL (ref 149–390)
PLATELET # BLD AUTO: 58 THOUSANDS/UL (ref 149–390)
PMV BLD AUTO: 10.5 FL (ref 8.9–12.7)
PMV BLD AUTO: 9.7 FL (ref 8.9–12.7)
POTASSIUM SERPL-SCNC: 4.5 MMOL/L (ref 3.5–5.3)
PR INTERVAL: 144 MS
PROT SERPL-MCNC: 4.5 G/DL (ref 6.4–8.2)
PROTHROMBIN TIME: 17 SECONDS (ref 11.8–14.2)
QRS AXIS: -8 DEGREES
QRSD INTERVAL: 74 MS
QT INTERVAL: 340 MS
QTC INTERVAL: 438 MS
RBC # BLD AUTO: 2.6 MILLION/UL (ref 3.81–5.12)
SODIUM SERPL-SCNC: 138 MMOL/L (ref 136–145)
T WAVE AXIS: -1 DEGREES
VENTRICULAR RATE: 100 BPM
WBC # BLD AUTO: 3.27 THOUSAND/UL (ref 4.31–10.16)

## 2018-05-18 PROCEDURE — 82948 REAGENT STRIP/BLOOD GLUCOSE: CPT

## 2018-05-18 PROCEDURE — 85610 PROTHROMBIN TIME: CPT | Performed by: NURSE PRACTITIONER

## 2018-05-18 PROCEDURE — 97167 OT EVAL HIGH COMPLEX 60 MIN: CPT

## 2018-05-18 PROCEDURE — 93010 ELECTROCARDIOGRAM REPORT: CPT | Performed by: INTERNAL MEDICINE

## 2018-05-18 PROCEDURE — 85049 AUTOMATED PLATELET COUNT: CPT | Performed by: NURSE PRACTITIONER

## 2018-05-18 PROCEDURE — 85027 COMPLETE CBC AUTOMATED: CPT | Performed by: INTERNAL MEDICINE

## 2018-05-18 PROCEDURE — G8987 SELF CARE CURRENT STATUS: HCPCS

## 2018-05-18 PROCEDURE — G8988 SELF CARE GOAL STATUS: HCPCS

## 2018-05-18 PROCEDURE — 99232 SBSQ HOSP IP/OBS MODERATE 35: CPT | Performed by: NURSE PRACTITIONER

## 2018-05-18 PROCEDURE — 99232 SBSQ HOSP IP/OBS MODERATE 35: CPT | Performed by: INTERNAL MEDICINE

## 2018-05-18 PROCEDURE — 80053 COMPREHEN METABOLIC PANEL: CPT | Performed by: NURSE PRACTITIONER

## 2018-05-18 PROCEDURE — 85730 THROMBOPLASTIN TIME PARTIAL: CPT | Performed by: NURSE PRACTITIONER

## 2018-05-18 RX ORDER — ACETAMINOPHEN 325 MG/1
650 TABLET ORAL EVERY 6 HOURS PRN
Status: DISCONTINUED | OUTPATIENT
Start: 2018-05-18 | End: 2018-05-22 | Stop reason: HOSPADM

## 2018-05-18 RX ADMIN — ASPIRIN 81 MG: 81 TABLET, COATED ORAL at 09:55

## 2018-05-18 RX ADMIN — INSULIN LISPRO 2 UNITS: 100 INJECTION, SOLUTION INTRAVENOUS; SUBCUTANEOUS at 22:22

## 2018-05-18 RX ADMIN — CALCIUM CARBONATE 500 MG (1,250 MG)-VITAMIN D3 200 UNIT TABLET 1 TABLET: at 09:55

## 2018-05-18 RX ADMIN — INSULIN LISPRO 2 UNITS: 100 INJECTION, SOLUTION INTRAVENOUS; SUBCUTANEOUS at 18:00

## 2018-05-18 RX ADMIN — CEFTRIAXONE 1000 MG: 1 INJECTION, SOLUTION INTRAVENOUS at 00:00

## 2018-05-18 RX ADMIN — DOCUSATE SODIUM 100 MG: 100 CAPSULE, LIQUID FILLED ORAL at 18:00

## 2018-05-18 RX ADMIN — INSULIN LISPRO 1 UNITS: 100 INJECTION, SOLUTION INTRAVENOUS; SUBCUTANEOUS at 09:56

## 2018-05-18 RX ADMIN — INSULIN LISPRO 2 UNITS: 100 INJECTION, SOLUTION INTRAVENOUS; SUBCUTANEOUS at 13:16

## 2018-05-18 RX ADMIN — DOCUSATE SODIUM 100 MG: 100 CAPSULE, LIQUID FILLED ORAL at 09:55

## 2018-05-18 RX ADMIN — CEFTRIAXONE 1000 MG: 1 INJECTION, SOLUTION INTRAVENOUS at 22:22

## 2018-05-18 RX ADMIN — CYANOCOBALAMIN TAB 500 MCG 1000 MCG: 500 TAB at 09:55

## 2018-05-18 NOTE — PLAN OF CARE
Problem: DISCHARGE PLANNING - CARE MANAGEMENT  Goal: Discharge to post-acute care or home with appropriate resources  INTERVENTIONS:  - Conduct assessment to determine patient/family and health care team treatment goals, and need for post-acute services based on payer coverage, community resources, and patient preferences, and barriers to discharge  - Address psychosocial, clinical, and financial barriers to discharge as identified in assessment in conjunction with the patient/family and health care team  - Arrange appropriate level of post-acute services according to patient's   needs and preference and payer coverage in collaboration with the physician and health care team  - Communicate with and update the patient/family, physician, and health care team regarding progress on the discharge plan  - Arrange appropriate transportation to post-acute venues  - Return to [facility] v  STR  Outcome: Progressing

## 2018-05-18 NOTE — ASSESSMENT & PLAN NOTE
Possible mechanical fall versus true syncope?   As far workup only remarkable for possible urinary tract infection which might have contributed to patient mechanical fall/syncope  Continue the treatment of UTI with Rocephin, urine culture with more than 100,000 CFU gram-negative rods enteric like, pending final  Fall precautions  PT and OT  Rehabilitation upon discharge

## 2018-05-18 NOTE — PROGRESS NOTES
Pt has only urinated small amounts since admission  Not c/o having to urinate  Bladder scanned for >1000  Pt asked to attempt to urinate in bedpan  Urinated 100 mL  Order obtained to straight cath  Straight cath for 650

## 2018-05-18 NOTE — ASSESSMENT & PLAN NOTE
Due to significant metastatic disease and renal disease and possible chemotherapy?   Continue with follow-up as outpatient with Oncology, family to discuss goal of care with Dr Matthew Shi  If no further aggressive option for cancer, patient and family will not be against hospice level of care  For now the plan is to discussed with primary Oncology, discharged the patient to rehabilitation center with possible transition to hospice at later time  Patient evaluated by palliative care group, appreciate recommendations, code status changed from full code to DNR level 3

## 2018-05-18 NOTE — SOCIAL WORK
Pt is < 30 day readmission  CM t/c to pt's daughter/POA Meliton Rubio [phone]  re: CM role and possible d/c needs  Per daughter, pt lives at [address]  Pt can dress self, receives help with bathing, meals and cleaning services  Pt uses rolling walker  Denies VNA or STR  No hx inpatient mental health/drug and alcohol  Pt receives medications from [facility]  Discussed possible need for STR prior to return to [facility]  ECIN referral sent to [facility] and SNF list left at bedside for additional choices per daughter's request  CM to follow  CM reviewed d/c planning process including the following: identifying help at home, patient preference for d/c planning needs, Discharge Lounge, [facility] Meds to Bed program, availability of treatment team to discuss questions or concerns patient and/or family may have regarding understanding medications and recognizing signs and symptoms once discharged  CM also encouraged patient to follow up with all recommended appointments after discharge  Patient advised of importance for patient and family to participate in managing patients medical well being

## 2018-05-18 NOTE — SOCIAL WORK
MCG Guide Used for Initial Round: Syncope RRG  Optimal GLOS: 1  Hospital Day: 2 days  DC Readiness: Discharge readiness is indicated by patient meeting Recovery Milestones, including ALL of the following:   Hemodynamic stability   Dangerous arrhythmia absent   No acute cardiac or neurologic events   Diagnostic evaluation negative for causes requiring inpatient care   No further syncope   Ambulatory   Oral hydration, medications, and diet   Discharge plans and education understood    Identified Barriers:  Oncology consult, s/p falls need placement  Discussion Date (Time): 05/18/18

## 2018-05-18 NOTE — OCCUPATIONAL THERAPY NOTE
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)    Liver metastases (HCC)    Bone metastasis (HCC)    Metastatic breast cancer (HCC)    CKD (chronic kidney disease)    Altered mental status    Syncope    [facility]    Bilateral lower extremity edema    Type 2 diabetes mellitus without complication, with long-term current use of insulin ([facility] )    Head contusion    Anemia, deficiency    Benign essential hypertension    Contusion    Diabetes mellitus ([facility]    Hemochromatosis    Malignant neoplasm of central portion of right female breast [facility])     Past Medical History  Past Medical History:   Diagnosis Date    Anemia     Bone metastases ([facility] )     [facility]     Diabetes mellitus ([facility] )     Hemochromatosis     Hypertension     Liver metastases ([facility] )       05/18/18 1335   Note Type   Note type Eval/Treat   Restrictions/Precautions   Weight Bearing Precautions Per Order No   Other Precautions Cognitive; Fall Risk   Pain Assessment   Pain Assessment No/denies pain   Pain Score No Pain   Home Living   Type of Home Assisted living  [facility]   Home Layout One level   [address] [place] [address] Walker   Prior Function   Level of [place] Independent with ADLs and functional mobility   Lives With Alone; Facility staff   Receives Help From Family;Personal care attendant   ADL Assistance Independent   IADLs Needs assistance   Falls in the last 6 months 1 to 4   Vocational Retired   Lifestyle   Autonomy Pt from an [facility]  Pt I with dressing and sponge baths at the sink  Staff present for bathing but pt reports she does most of it herself  Uses a RW at baseline  Reciprocal Relationships Pt receives assist from facility staff PRN   Dtr supportive and present during evaluation  Service to Others Retired   Psychosocial   Psychosocial (WDL) WDL   ADL   Where Assessed Edge of bed   Eating Assistance 5  [address]  Minimal Assistance   Bed Mobility   Supine to Sit 5  Supervision   Additional items Increased time required   Sit to Supine 5  Supervision   Additional items Increased time required   Transfers   Sit to Stand 5  Supervision  (CGA)   Additional items Increased time required   Stand to Sit 5  Supervision   Additional items Increased time required   Stand pivot 4  Minimal assistance   Additional Comments Pt too fatigued to walk from bed to door, however she was able to lateral step closer to [facility] w/ min A for RW mngmt  Functional Mobility   Additional items Rolling walker   Balance   Static Sitting Fair   Dynamic Sitting Fair   Static Standing Fair -   Dynamic Standing Poor +   Activity Tolerance   Activity Tolerance Patient limited by fatigue   Nurse Made Aware Okay to see per Elsa GARCIA   RUDEAN Assessment   RUE Assessment WFL  (3/4 AROM)   LUE Assessment   LUE Assessment WFL  (3/4 AROM)   Hand Function   Gross Motor Coordination Functional   Fine Motor Coordination Functional   Cognition   Arousal/Participation Alert; Responsive; Cooperative   Attention Within functional limits   Orientation Level Oriented to person;Oriented to place;Oriented to situation  (Pt reports it being April  Oriented to the year)   Memory Decreased recall of recent events   Following Commands Follows one step commands with increased time or repetition   Comments Pt is pleasant and cooperative  She is limited by fatigue during this session   Pt denies any head trauma symtpoms  She reports that she recalls falling, however she is unable to identify why she falls  Pt denies dizziness/lightheadedness  She requires vc for RW safety and some repetition of directions  Assessment   Limitation Decreased ADL status; Decreased UE strength;Decreased endurance;Decreased Safe judgement during ADL;Decreased self-care trans;Decreased high-level ADLs   Prognosis Good   Assessment Pt is a 80 y o  female who was admitted to [facility] on 5/16/2018 with Syncope  Pt s/p a fall at her care home  CT of head revealed frontal scalp hematoma  Pt reports that she remembers the fall but cannot recall why she fell  Pt's active problems upon admission include bone metastasis, liver metastases, head contusion, DM Type 2, B/L LE edema, pancytopenia, and CKD  Pt's PMH significant for hx of breast cancer, frequent falls, HTN, and hemochromatosis  At baseline pt was I with dressing  Staff is present for sponge bathing, however pt reports that she is able to do most of it herself  Receives assist for IADLs and was mod I w/ RW for functional mobility/transfers  Pt [facility] [address] [facility]  Currently pt requires min A for overall ADLS and S/min A for functional mobility/transfers w/ RW  Pt limited by fatigue and only able to lateral step EOB w/ RW  Educated pt on ankle pump exercises to perform while in supine or EOB before standing  Also educated pt on fall prevention strategies, such as resting between change of position  Pt and dtr verbalized understanding of education  Dtr reports pt uses a BSC over the toilet in her apartment, therefore s/u a BSC in pt's room to use while in the hospital  Informed nrsg staff and encouraged use of commode either bedside or over toilet depending on pt's level of fatigue   Pt currently presents with impairments in the following categories -difficulty performing ADLS, difficulty performing IADLS, activity tolerance, endurance, standing balance/tolerance, sitting balance/tolerance, UE strength, memory and insight  These impairments, as well as pt's fatigue, risk for falls and hx of frequent falls PTA limit pt's ability to safely engage in all baseline areas of occupation, including grooming, bathing, dressing, toileting, functional mobility/transfers and leisure activities  From OT standpoint, recommend return to previous environment vs STR pending pt's progress upon D/C  OT will continue to follow to address the below stated goals  Goals   Patient Goals to rest    LTG Time Frame 7-10   Long Term Goal see below goals    Plan   Treatment Interventions ADL retraining;Functional transfer training;UE strengthening/ROM; Endurance training;Patient/family training;Equipment evaluation/education; Compensatory technique education; Energy conservation; Activityengagement   Goal Expiration Date 05/28/18   OT Frequency 3-5x/wk   Recommendation   OT Discharge Recommendation Other (Comment)  (Return to [facility] vs STR pending progress)   Barthel Index   Feeding 5   Bathing 0   Grooming Score 5   Dressing Score 5   Bladder Score 5   Bowels Score 10   Toilet Use Score 5   Transfers (Bed/Chair) Score 10   Mobility (Level Surface) Score 0   Stairs Score 0   Barthel Index Score 45   Modified Hamel Scale   Modified Hamel Scale 4     LTG (7-10 DAYS)  Pt will perform all ADL's at SBA level with G balance and DME/AE as needed      Pt will perform functional transfers, including toilet transfer, at MOD I level w/ use of DME/AE as needed       Pt will perform functional mobility at a MOD I level w/ use of DME and G balance      Pt will demonstrate good carry over of RW safety and energy conservation techniques      Pt will demonstrate good carry over of pt/family education and training w/ 100% attention to task to assist w/ safe d/c planning      Pt will improve activity tolerance to G for 30 min treatment session      Pt will improve standing balance to G for 8-10 minutes of purposeful activity w/ G endurance  Pt will participate in further/ongoing cog assessment as needed w/ 100% attention to assist w/ participation in ADLs/IADLs and assist w/ safe d/c planning          Johanna Beltran, OTR/L

## 2018-05-18 NOTE — ASSESSMENT & PLAN NOTE
Of multiple body parts expression early forehead, upper extremities, lower extremities  Multiple skin tears present on admission  Appreciated wound care consult and recommendations for local care  Fall precautions in place  PT and OT as able  For rehabilitation upon discharge  Supportive care

## 2018-05-18 NOTE — NUTRITION
Nutrition consult; summary and recs noted below:     05/18/18 1508   Recommendations/Interventions   Summary pt appears to be tolerating meals with fair intake thus far and will likely meet at least 85% minimum energy needs   Interventions Diet: continued as ordered; Supplement initiate  (per RD protocol: will add ensure compact for additional kcal and protein to further optimize po intake)   Nutrition Recommendations Continue diet as ordered

## 2018-05-18 NOTE — PLAN OF CARE
Problem: OCCUPATIONAL THERAPY ADULT  Goal: Performs self-care activities at highest level of function for planned discharge setting  See evaluation for individualized goals  Treatment Interventions: ADL retraining, Functional transfer training, UE strengthening/ROM, Endurance training, Patient/family training, Equipment evaluation/education, Compensatory technique education, Energy conservation, Activityengagement          See flowsheet documentation for full assessment, interventions and recommendations  Limitation: Decreased ADL status, Decreased UE strength, Decreased endurance, Decreased Safe judgement during ADL, Decreased self-care trans, Decreased high-level ADLs  Prognosis: Good  Assessment: Pt is a 80 y o  female who was admitted to [facility] on 5/16/2018 with Syncope  Pt s/p a fall at her long-term  CT of head revealed frontal scalp hematoma  Pt reports that she remembers the fall but cannot recall why she fell  Pt's active problems upon admission include bone metastasis, liver metastases, head contusion, DM Type 2, B/L LE edema, pancytopenia, and CKD  Pt's PMH significant for hx of breast cancer, frequent falls, HTN, and hemochromatosis  At baseline pt was I with dressing  Staff is present for sponge bathing, however pt reports that she is able to do most of it herself  Receives assist for IADLs and was mod I w/ RW for functional mobility/transfers  Pt lives at [address] [facility]  Currently pt requires min A for overall ADLS and S/min A for functional mobility/transfers w/ RW  Pt limited by fatigue and only able to lateral step EOB w/ RW  Educated pt on ankle pump exercises to perform while in supine or EOB before standing  Also educated pt on fall prevention strategies, such as resting between change of position  Pt and dtr verbalized understanding of education   Dtr reports pt uses a BSC over the toilet in her apartment, therefore s/u a BSC in pt's room to use while in the hospital  Informed nrsg staff and encouraged use of commode either bedside or over toilet depending on pt's level of fatigue  Pt currently presents with impairments in the following categories -difficulty performing ADLS, difficulty performing IADLS, activity tolerance, endurance, standing balance/tolerance, sitting balance/tolerance, UE strength, memory and insight  These impairments, as well as pt's fatigue, risk for falls and hx of frequent falls PTA limit pt's ability to safely engage in all baseline areas of occupation, including grooming, bathing, dressing, toileting, functional mobility/transfers and leisure activities  From OT standpoint, recommend return to previous environment vs STR pending pt's progress upon D/C  OT will continue to follow to address the below stated goals        OT Discharge Recommendation: Other (Comment) (Return to [facility] vs STR pending progress)

## 2018-05-18 NOTE — CONSULTS
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)    Liver metastases (HCC)    [facility] )    Metastatic breast cancer (HCC)    CKD (chronic kidney disease)    Altered mental status    Syncope    Pancytopenia ([facility] )    Bilateral lower extremity edema    Type 2 diabetes mellitus without complication, with long-term current use of insulin ([facility] )    Head contusion       Plan:  1  Symptom management - Denies need for Palliative symptom control    - Recommend global delirium precautions including:    - Establishment of day/night cycle via lights during the day and blinds open   Please limit interruptions at night as medically appropriate  - Provide glasses/hearing aids as apprioriate     - Minimize deliriogenic meds as able  - Provide reorientation including date on board and visible clock  - Avoid restraints as able, frequent verbal reorientations or patient care sitter as appropriate    - Suggest PT/OT consult  - Discussed with Dr Halima Deutsch       2  Goals - evolving   - The role of Palliative Care introduced to pt and her daughter Davene Moritz over the phone    - Pt has a living will that specifies that she would not want to undergo CPR/intubation  This was confirmed with both pt and her daughter who is her medical POA  Code status changed to level 3    - Pt enjoys living at her assisted living facility but given her increased weakness, frailty and multiple falls she would benefit from more skilled care facility  Davene Moritz is requesting that mom get rehab and transition to a long care facility  We discussed results of recent imaging and options of continuing with disease directed therapy vs a more comfort focused approach and the idea of rehab with transition to hospice offered      - At this time, pt and family agreeable to rehab  Would like to further discuss treatment options with Dr Mosquera Staff as outpatient but are appreciative of information provided today  Contact information provided for Benita Padilla and she was encouraged to call with any additional questions or concerns  Code Status: DNR/DNI - Level 3   Decisional apparatus:  Patient is competent on my exam today  If competence is lost, patient's substitute decision maker would default to daughter Benita Padilla her POA  Advance Directive / Living Will / POLST:  On file and reviewed         We appreciate the invitation to be involved in this patient's care  Please do not hesitate to reach our on call provider through our clinic answering service at  should you have acute symptom control concerns  IDENTIFICATION:  Inpatient consult to Palliative Care  Consult performed by: Shalini Erickson ordered by: Dennis Bonner        Physician Requesting Consult: Stefany Cardona MD  Reason for Consult / Principal Problem: GOC  Hx and PE limited by: forgetful    HISTORY OF PRESENT ILLNESS:       Latesha Johnston is a 80 y o  female who presents with fall, this is her third hospital encounter this month due to falls  She has a PMH significant for stage IV metastatic breast cancer and has been receiving active treatment under direction of Dr Mosquera Staff  Imaging studies done in ED reviewed, CT head negative for intracranial abnormalities or bleed, CT C spine shows diffuse osseous metastasis, CT AP shows hepatic metastasis  She has CKD, bilateral lower extremity edema, DM II  Given, her multiple co-morbidities, recurrent falls PSC was consulted to discuss goals of care  Pt was seen at bedside, no family present a time of visit  Pt was alert, pleasant  She was able to tell me that she had a fall stating "my legs just gave out on me"  She does not remember her previous falls   She does not appear to understand the severity of her cancer or multiple medical conditions  She is a [facility] at [facility] where she enjoys living and playing [address]  She denies any pain or discomfort  No c/o headache, lightheadedness or dizziness  She has a living will scanned into chart under media section that states she pascual not want CPR/intubation  When questioned about this she confirms this is true  She has four children and tells me her daughter Ham Malcolm is her POA and I can "give her a call"  I spoke at length with her daughter Ham Malcolm who is her medical POA  I updated her on her mom's clinical status and we discussed her recent hospitalizations for falls and that her frailty and h/o metastatic bony disease and multiple falls places her at great risk for increased injury  Ham Malcolm questioned if cancer treatments are working or if they are just contributing to her weakness and states that it has been harder and harder to get her out to doctor appointments  I did review results of all imaging done while hospitalized and informed her that her mom would certainly be eligible for a more comfort focused approach if she no longer wishes to continue with current treatments  We did discuss hospice and hospice services were explained  Ham Malcolm was appreciative of the information, she would like discuss options further with her mom's oncologist before making any decisions  She would greatly appreciate if her mom could get some rehab after this hospital stay and acknowledges that she not safe to live on her own in an independent facility any longer  We also discussed her mom's wishes in regards to living will on file and Ham Malcolm also confirmed that her mom would not wish to undergo CPR/intubation and appreciated the change in code status to reflect this  My contact information was provided and she was encouraged to call anytime with questions/concerns  Review of Systems   Constitution: Positive for weakness  Cardiovascular: Positive for leg swelling     Musculoskeletal: Positive for falls and muscle weakness  Psychiatric/Behavioral: Positive for memory loss  Forgetful   All other systems reviewed and are negative  Past Medical History:   Diagnosis Date    Anemia     [facility] metastases ([facility] )     Breast cancer ([facility] )     Diabetes mellitus ([facility] )     Hemochromatosis     Hypertension     Liver metastases ([facility] )      No past surgical history on file  Social History     Social History    Marital status:      Spouse name: N/A    Number of children: N/A    Years of education: N/A     Occupational History    Not on file  Social History Main Topics    Smoking status: Never Smoker    Smokeless tobacco: Never Used    Alcohol use No    Drug use: No    Sexual activity: Not on file     Other Topics Concern    Not on file     Social History Narrative    No narrative on file     No family history on file  MEDICATIONS / ALLERGIES:    all current active meds have been reviewed    No Known Allergies    OBJECTIVE:    Physical Exam  Physical Exam   Constitutional: She is oriented to person, place, and time  No distress  HENT:   Large ecchymotic contusion on forehead     Eyes: Pupils are equal, round, and reactive to light  No scleral icterus  Cardiovascular: Normal rate and regular rhythm  Pulmonary/Chest: Effort normal  No respiratory distress  Abdominal: Soft  obese   Musculoskeletal: She exhibits edema  Neurological: She is alert and oriented to person, place, and time  Forgetful  Answers questions appropriately, follows commands  Skin: She is not diaphoretic  Multiple areas of ecchymosis on bilateral upper extremities    Psychiatric: She has a normal mood and affect   Her behavior is normal        Lab Results:   CBC:   Lab Results   Component Value Date    WBC 3 27 (L) 05/18/2018    HGB 10 2 (L) 05/18/2018    HCT 29 5 (L) 05/18/2018     (H) 05/18/2018    PLT 58 (L) 05/18/2018    MCH 39 2 (H) 05/18/2018    MCHC 34 6 05/18/2018    RDW 17 8 (H) 05/18/2018    MPV 10 5 05/18/2018   , CMP:   Lab Results   Component Value Date     05/18/2018    K 4 5 05/18/2018     05/18/2018    CO2 25 05/18/2018    ANIONGAP 7 05/18/2018    BUN 44 (H) 05/18/2018    CREATININE 1 86 (H) 05/18/2018    GLUCOSE 145 (H) 05/18/2018    CALCIUM 9 0 05/18/2018     (H) 05/18/2018    ALT 54 05/18/2018    ALKPHOS 245 (H) 05/18/2018    PROT 4 5 (L) 05/18/2018    BILITOT 3 26 (H) 05/18/2018    EGFR 23 05/18/2018     Imaging Studies: reviewed CT head, CT C Spine, CT abdomen/pelvis and VAS of lower limbs  EKG, Pathology, and Other Studies:     Counseling / Coordination of Care  Total floor / unit time spent today 60 minutes  Greater than 50% of total time was spent with the patient and / or family counseling and / or coordination of care   A description of the counseling / coordination of care: discussion with Dr Surya Schulz, chart review, bedside assessment of pt and discussion with pt's daughter/POA over the phone

## 2018-05-18 NOTE — CONSULTS
Please refer to the Consult note on 5/17/18   The orders are placed in the sticky note and on the nursing orders   Patient was seen in the ED by Janeane Phoenix RN BSN    Austin Mendoza RN BSN Tamiko King

## 2018-05-18 NOTE — PROGRESS NOTES
Progress Note - Jaime Albright 7/9/1927, 80 y o  female MRN: [id number]    Unit/Bed#: [facility] 098-61 Encounter: [id number]    Primary Care Provider: Simona Calderon MD   Date and time admitted to hospital: 5/16/2018  5:08 PM        Contusion   Assessment & Plan    Of multiple body parts expression early forehead, upper extremities, lower extremities  Multiple skin tears present on admission  Appreciated wound care consult and recommendations for local care  Fall precautions in place  PT and OT as able  For rehabilitation upon discharge  Supportive care        Head contusion   Assessment & Plan    Slowly improving, supportive care        Type 2 diabetes mellitus without complication, with long-term current use of insulin (HCC)   Assessment & Plan    Continue with insulin at the current dose and monitoring          Bilateral lower extremity edema   Assessment & Plan    - Severe B/L lower extremity pitting edema in pt with hx of metastatic cancer and hypalbuminemia  - Prior admission venous duplex performed negative for dvt   - however now with significant new trauma to bl lower extremities   - elevation and wound care in vulnerable pt   - nutrition consult   -avoid diuretic stable, use compression if supportive care feels improved edema        Pancytopenia ([address] )   Assessment & Plan    Due to significant metastatic disease and renal disease and possible chemotherapy?   Continue with follow-up as outpatient with Oncology, family to discuss goal of care with Dr Heber Russell  If no further aggressive option for cancer, patient and family will not be against hospice level of care  For now the plan is to discussed with primary Oncology, discharged the patient to rehabilitation center with possible transition to hospice at later time  Patient evaluated by palliative care group, appreciate recommendations, code status changed from full code to DNR level 3        Liver metastases [facility])   Assessment & Plan    Elevated LFT secondary to known liver metastasis  Supportive care  LFTs p r n  * Syncope   Assessment & Plan    Possible mechanical fall versus true syncope? As far workup only remarkable for possible urinary tract infection which might have contributed to patient mechanical fall/syncope  Continue the treatment of UTI with Rocephin, urine culture with more than 100,000 CFU gram-negative rods enteric like, pending final  Fall precautions  PT and OT  Rehabilitation upon discharge           VTE Pharmacologic Prophylaxis: no  Pharmacologic: Thrombocytopenia  Mechanical VTE Prophylaxis in Place: Yes    Patient Centered Rounds: I have performed bedside rounds with nursing staff today  Discussions with Specialists or Other Care Team Provider:   Palliative care    Education and Discussions with Family / Patient:  Patient, daughter    Time Spent for Care: 30 minutes  More than 50% of total time spent on counseling and coordination of care as described above  Current Length of Stay: 2 day(s)    Current Patient Status: Inpatient   Certification Statement: The patient will continue to require additional inpatient hospital stay due to Please refer to above    Discharge Plan:   Rehabilitation for 1-2 days    Code Status: Level 3 - DNAR and DNI      Subjective:   Patient states that she is feeling better today  She has no recollection of the events of admission  She denies complaints other than mild pain at the bruising on her forehead  Objective:     Vitals:   Temp (24hrs), Av 5 °F (36 4 °C), Min:97 4 °F (36 3 °C), Max:97 7 °F (36 5 °C)    HR:  [] 102  Resp:  [16-20] 20  BP: ()/(40-72) 150/63  SpO2:  [97 %-100 %] 98 %  Body mass index is 28 32 kg/m²  Input and Output Summary (last 24 hours):        Intake/Output Summary (Last 24 hours) at 18 1528  Last data filed at 18 1300   Gross per 24 hour   Intake              540 ml   Output             1947 ml   Net            -1407 ml       Physical Exam: Physical Exam   Constitutional: She is oriented to person, place, and time  She appears well-developed  Cardiovascular: Normal rate, regular rhythm and normal heart sounds  Exam reveals no friction rub  No murmur heard  Pulmonary/Chest: Effort normal  No respiratory distress  She has no wheezes  She has no rales  Abdominal: Soft  She exhibits no distension  There is no tenderness  There is no rebound  Musculoskeletal: She exhibits edema (traces B/L)  Neurological: She is alert and oriented to person, place, and time  She exhibits normal muscle tone  Skin: Skin is warm  Multiple bruises forehead, UE, LE bilaterally and also left toes   Psychiatric: She has a normal mood and affect  Additional Data:     Labs:      Results from last 7 days  Lab Units 05/18/18  0824  05/17/18  0711   WBC Thousand/uL 3 27*  --  1 52*   HEMOGLOBIN g/dL 10 2*  --  8 5*   HEMATOCRIT % 29 5*  --  24 7*   PLATELETS Thousands/uL 58*  < > 52*   NEUTROS PCT %  --   --  51   LYMPHS PCT %  --   --  36   MONOS PCT %  --   --  13*   EOS PCT %  --   --  0   < > = values in this interval not displayed  Results from last 7 days  Lab Units 05/18/18  0727   SODIUM mmol/L 138   POTASSIUM mmol/L 4 5   CHLORIDE mmol/L 106   CO2 mmol/L 25   BUN mg/dL 44*   CREATININE mg/dL 1 86*   CALCIUM mg/dL 9 0   TOTAL PROTEIN g/dL 4 5*   BILIRUBIN TOTAL mg/dL 3 26*   ALK PHOS U/L 245*   ALT U/L 54   AST U/L 103*   GLUCOSE RANDOM mg/dL 145*       Results from last 7 days  Lab Units 05/18/18  0008   INR  1 37*       * I Have Reviewed All Lab Data Listed Above  * Additional Pertinent Lab Tests Reviewed:  All Labs Within Last 24 Hours Reviewed    Imaging:    Imaging Reports Reviewed Today Include:   None  Imaging Personally Reviewed by Myself Includes: none    Recent Cultures (last 7 days):       Results from last 7 days  Lab Units 05/16/18  2303   URINE CULTURE  >100,000 cfu/ml Gram Negative Cosme Enteric Like*       Last 24 Hours Medication List:     Current Facility-Administered Medications:  acetaminophen 650 mg Oral Q6H PRN Tess Ortiz MD    amLODIPine 2 5 mg Oral Daily Mikal Hoop, CRNP    aspirin 81 mg Oral Daily Mikal Hoop, CRNP    calcium carbonate 1,000 mg Oral Daily PRN Mikal Hoop, CRNP    calcium carbonate-vitamin D 1 tablet Oral Daily Mikal Hoop, CRNP    cefTRIAXone 1,000 mg Intravenous Q24H Mikal Hoop, CRNP Last Rate: 1,000 mg (05/18/18 0000)   cyanocobalamin 1,000 mcg Oral Daily Mikal Hoop, CRNP    docusate sodium 100 mg Oral BID Mikal Hoop, CRNP    insulin lispro 1-5 Units Subcutaneous HS Mikal Hoop, CRNP    insulin lispro 1-6 Units Subcutaneous TID AC KISHORE Bettencourt    phenol 1 spray Mouth/Throat Q2H PRN Tess Ortiz MD         Today, Patient Was Seen By: Tess Ortiz MD    ** Please Note: Dragon 360 Dictation voice to text software may have been used in the creation of this document   **

## 2018-05-19 PROBLEM — R33.8 ACUTE URINARY RETENTION: Status: ACTIVE | Noted: 2018-05-19

## 2018-05-19 LAB
ANION GAP SERPL CALCULATED.3IONS-SCNC: 5 MMOL/L (ref 4–13)
BACTERIA UR CULT: ABNORMAL
BASOPHILS # BLD MANUAL: 0 THOUSAND/UL (ref 0–0.1)
BASOPHILS NFR MAR MANUAL: 0 % (ref 0–1)
BUN SERPL-MCNC: 44 MG/DL (ref 5–25)
CALCIUM SERPL-MCNC: 9 MG/DL (ref 8.3–10.1)
CHLORIDE SERPL-SCNC: 105 MMOL/L (ref 100–108)
CO2 SERPL-SCNC: 27 MMOL/L (ref 21–32)
CREAT SERPL-MCNC: 2.06 MG/DL (ref 0.6–1.3)
EOSINOPHIL # BLD MANUAL: 0 THOUSAND/UL (ref 0–0.4)
EOSINOPHIL NFR BLD MANUAL: 0 % (ref 0–6)
ERYTHROCYTE [DISTWIDTH] IN BLOOD BY AUTOMATED COUNT: 18.4 % (ref 11.6–15.1)
GFR SERPL CREATININE-BSD FRML MDRD: 21 ML/MIN/1.73SQ M
GLUCOSE SERPL-MCNC: 151 MG/DL (ref 65–140)
GLUCOSE SERPL-MCNC: 177 MG/DL (ref 65–140)
GLUCOSE SERPL-MCNC: 191 MG/DL (ref 65–140)
GLUCOSE SERPL-MCNC: 204 MG/DL (ref 65–140)
GLUCOSE SERPL-MCNC: 212 MG/DL (ref 65–140)
GLUCOSE SERPL-MCNC: 274 MG/DL (ref 65–140)
HCT VFR BLD AUTO: 27.2 % (ref 34.8–46.1)
HGB BLD-MCNC: 9.2 G/DL (ref 11.5–15.4)
LYMPHOCYTES # BLD AUTO: 0.63 THOUSAND/UL (ref 0.6–4.47)
LYMPHOCYTES # BLD AUTO: 15 % (ref 14–44)
MAGNESIUM SERPL-MCNC: 2 MG/DL (ref 1.6–2.6)
MCH RBC QN AUTO: 39.1 PG (ref 26.8–34.3)
MCHC RBC AUTO-ENTMCNC: 33.8 G/DL (ref 31.4–37.4)
MCV RBC AUTO: 116 FL (ref 82–98)
MONOCYTES # BLD AUTO: 0.21 THOUSAND/UL (ref 0–1.22)
MONOCYTES NFR BLD: 5 % (ref 4–12)
NEUTROPHILS # BLD MANUAL: 3.37 THOUSAND/UL (ref 1.85–7.62)
NEUTS BAND NFR BLD MANUAL: 7 % (ref 0–8)
NEUTS SEG NFR BLD AUTO: 73 % (ref 43–75)
NRBC BLD AUTO-RTO: 1 /100 WBCS
PHOSPHATE SERPL-MCNC: 2 MG/DL (ref 2.3–4.1)
PLATELET # BLD AUTO: 58 THOUSANDS/UL (ref 149–390)
PMV BLD AUTO: 10.1 FL (ref 8.9–12.7)
POTASSIUM SERPL-SCNC: 4.7 MMOL/L (ref 3.5–5.3)
RBC # BLD AUTO: 2.35 MILLION/UL (ref 3.81–5.12)
SODIUM SERPL-SCNC: 137 MMOL/L (ref 136–145)
TOTAL CELLS COUNTED SPEC: 100
WBC # BLD AUTO: 4.21 THOUSAND/UL (ref 4.31–10.16)

## 2018-05-19 PROCEDURE — 99231 SBSQ HOSP IP/OBS SF/LOW 25: CPT | Performed by: INTERNAL MEDICINE

## 2018-05-19 PROCEDURE — 84100 ASSAY OF PHOSPHORUS: CPT | Performed by: INTERNAL MEDICINE

## 2018-05-19 PROCEDURE — 80048 BASIC METABOLIC PNL TOTAL CA: CPT | Performed by: INTERNAL MEDICINE

## 2018-05-19 PROCEDURE — 82948 REAGENT STRIP/BLOOD GLUCOSE: CPT

## 2018-05-19 PROCEDURE — G8978 MOBILITY CURRENT STATUS: HCPCS

## 2018-05-19 PROCEDURE — 85007 BL SMEAR W/DIFF WBC COUNT: CPT | Performed by: INTERNAL MEDICINE

## 2018-05-19 PROCEDURE — 83735 ASSAY OF MAGNESIUM: CPT | Performed by: INTERNAL MEDICINE

## 2018-05-19 PROCEDURE — 97163 PT EVAL HIGH COMPLEX 45 MIN: CPT

## 2018-05-19 PROCEDURE — G8979 MOBILITY GOAL STATUS: HCPCS

## 2018-05-19 PROCEDURE — 85027 COMPLETE CBC AUTOMATED: CPT | Performed by: INTERNAL MEDICINE

## 2018-05-19 RX ORDER — TAMSULOSIN HYDROCHLORIDE 0.4 MG/1
0.4 CAPSULE ORAL
Status: DISCONTINUED | OUTPATIENT
Start: 2018-05-19 | End: 2018-05-22 | Stop reason: HOSPADM

## 2018-05-19 RX ADMIN — CALCIUM CARBONATE 500 MG (1,250 MG)-VITAMIN D3 200 UNIT TABLET 1 TABLET: at 09:37

## 2018-05-19 RX ADMIN — DOCUSATE SODIUM 100 MG: 100 CAPSULE, LIQUID FILLED ORAL at 17:27

## 2018-05-19 RX ADMIN — INSULIN LISPRO 1 UNITS: 100 INJECTION, SOLUTION INTRAVENOUS; SUBCUTANEOUS at 17:28

## 2018-05-19 RX ADMIN — CYANOCOBALAMIN TAB 500 MCG 1000 MCG: 500 TAB at 09:37

## 2018-05-19 RX ADMIN — DIBASIC SODIUM PHOSPHATE, MONOBASIC POTASSIUM PHOSPHATE AND MONOBASIC SODIUM PHOSPHATE 2 TABLET: 852; 155; 130 TABLET ORAL at 10:01

## 2018-05-19 RX ADMIN — DOCUSATE SODIUM 100 MG: 100 CAPSULE, LIQUID FILLED ORAL at 09:37

## 2018-05-19 RX ADMIN — AMLODIPINE BESYLATE 2.5 MG: 2.5 TABLET ORAL at 09:37

## 2018-05-19 RX ADMIN — ASPIRIN 81 MG: 81 TABLET, COATED ORAL at 09:37

## 2018-05-19 RX ADMIN — INSULIN LISPRO 2 UNITS: 100 INJECTION, SOLUTION INTRAVENOUS; SUBCUTANEOUS at 11:04

## 2018-05-19 RX ADMIN — TAMSULOSIN HYDROCHLORIDE 0.4 MG: 0.4 CAPSULE ORAL at 17:27

## 2018-05-19 RX ADMIN — INSULIN LISPRO 2 UNITS: 100 INJECTION, SOLUTION INTRAVENOUS; SUBCUTANEOUS at 09:36

## 2018-05-19 RX ADMIN — INSULIN LISPRO 3 UNITS: 100 INJECTION, SOLUTION INTRAVENOUS; SUBCUTANEOUS at 23:10

## 2018-05-19 RX ADMIN — CEFTRIAXONE 1000 MG: 1 INJECTION, SOLUTION INTRAVENOUS at 23:10

## 2018-05-19 NOTE — ASSESSMENT & PLAN NOTE
Patient in need of straight catheterization for 3 attempt  As per protocol Eaton catheter was placed  Continue with Eaton care  Started on Flomax  Voiding trial at rehabilitation in 5-7 days

## 2018-05-19 NOTE — PLAN OF CARE
Problem: PHYSICAL THERAPY ADULT  Goal: Performs mobility at highest level of function for planned discharge setting  See evaluation for individualized goals  Treatment/Interventions: Functional transfer training, Endurance training, Patient/family training, Equipment eval/education, Bed mobility, Gait training, Spoke to nursing  Equipment Recommended: Heydi Esteban ([facility] [place])       See flowsheet documentation for full assessment, interventions and recommendations  Prognosis: Guarded  Problem List: Decreased strength, Decreased endurance, Impaired balance, Decreased mobility, Decreased cognition, Decreased safety awareness, Impaired hearing, Decreased skin integrity  Assessment: Pt is a 79 y/o female admitted to [facility] 2* s/p frequent falls,syncope and liver metastases  Pt resides at [facility] with use of RW for mobility,(+)falls  Pt currently is not at functional mobility baseline,needs Ax1 for mobility,use of RW for mobility,ataxic and unsteady gait pattern,multiple lines,h/o falls and at risk for falls,dec cognition and ongoing medical care  Pt demonstrates moderate deficits during functional mobility and gait including dec endurance,dec balance,dec BLE strength,unsteady and ataxic gait pattern and needs modAx1 for BM,transfers and gait with use of RW  Pt would cont to benefit from skilled inpt PT services to maximize functional independence        Recommendation: Other (Comment) (inpt rhb)          See flowsheet documentation for full assessment

## 2018-05-19 NOTE — ASSESSMENT & PLAN NOTE
- Severe B/L lower extremity pitting edema in pt with hx of metastatic cancer and hypalbuminemia  - Prior admission venous duplex performed negative for dvt   - improved with elevation of the lower extremity, trace edema on exam mostly at the feet bilaterally today  Continue with supportive care  Might use compression p r n

## 2018-05-19 NOTE — PROGRESS NOTES
Progress Note - Gita Matta 7/9/1927, 80 y o  female MRN: [id number]    Unit/Bed#: [facility] 618-01 Encounter: [id number]    Primary Care Provider: Faviola Goode MD   Date and time admitted to hospital: 5/16/2018  5:08 PM        Acute urinary retention   Assessment & Plan    Patient in need of straight catheterization for 3 attempt  As per protocol Eaton catheter was placed  Continue with Eaton care  Started on Flomax  Voiding trial at rehabilitation in 5-7 days        Contusion   Assessment & Plan    Of multiple body parts expression early forehead, upper extremities, lower extremities  Multiple skin tears present on admission  Appreciated wound care consult and recommendations for local care  Fall precautions in place  PT and OT as able  For rehabilitation upon discharge  Supportive care        Head contusion   Assessment & Plan    Slowly improving, supportive care        Type 2 diabetes mellitus without complication, with long-term current use of insulin (HCC)   Assessment & Plan    Continue with insulin at the current dose and monitoring          Bilateral lower extremity edema   Assessment & Plan    - Severe B/L lower extremity pitting edema in pt with hx of metastatic cancer and hypalbuminemia  - Prior admission venous duplex performed negative for dvt   - improved with elevation of the lower extremity, trace edema on exam mostly at the feet bilaterally today  Continue with supportive care  Might use compression p r n  Pancytopenia ([address] )   Assessment & Plan    Due to significant metastatic disease and renal disease and possible chemotherapy?   Continue with follow-up as outpatient with Oncology, family to discuss goal of care with Dr Silvano Alcaraz  If no further aggressive option for cancer, patient and family will not be against hospice level of care  For now the plan is to discussed with primary Oncology, discharged the patient to rehabilitation center with possible transition to hospice at later time  Patient evaluated by palliative care group, appreciate recommendations, code status changed from full code to DNR level 3  CBC remained stable today        Liver metastases [facility])   Assessment & Plan    Elevated LFT secondary to known liver metastasis  Supportive care  LFTs p r n  * Syncope   Assessment & Plan    Possible mechanical fall versus true syncope? As far workup only remarkable for possible urinary tract infection which might have contributed to patient mechanical fall/syncope  Continue the treatment of UTI with Rocephin, urine culture with more than 100,000 CFU gram-negative rods enteric like, pending final  Fall precautions  PT and OT  Rehabilitation upon discharge           VTE Pharmacologic Prophylaxis: no  Pharmacologic: Thrombocytopenia  Mechanical VTE Prophylaxis in Place: Yes     Patient Centered Rounds: I have performed bedside rounds with nursing staff today      Discussions with Specialists or Other Care Team Provider:  None yet     Education and Discussions with Family / Patient:  daughter     Time Spent for Care: 20 minutes  More than 50% of total time spent on counseling and coordination of care as described above      Current Length of Stay: 3 day(s)     Current Patient Status: Inpatient   Certification Statement: The patient will continue to require additional inpatient hospital stay due to Please refer to above     Discharge Plan:   Rehabilitation for 1-2 days     Code Status: Level 3 - DNAR and DNI    Subjective:   Patient is asleep    Objective:     Vitals:   Temp (24hrs), Av 7 °F (36 5 °C), Min:97 5 °F (36 4 °C), Max:98 °F (36 7 °C)    HR:  [] 101  Resp:  [18-20] 18  BP: (115-117)/(56-60) 117/56  SpO2:  [94 %-99 %] 94 %  Body mass index is 28 29 kg/m²  Input and Output Summary (last 24 hours):        Intake/Output Summary (Last 24 hours) at 18 1438  Last data filed at 18 1335   Gross per 24 hour   Intake              360 ml   Output 1000 ml   Net             -640 ml       Physical Exam:  Deferred, patient was awake most of the night given ongoing urinary retention  Family in agreement    Physical Exam    Additional Data:     Labs:      Results from last 7 days  Lab Units 05/19/18  0707  05/17/18  0711   WBC Thousand/uL 4 21*  < > 1 52*   HEMOGLOBIN g/dL 9 2*  < > 8 5*   HEMATOCRIT % 27 2*  < > 24 7*   PLATELETS Thousands/uL 58*  < > 52*   NEUTROS PCT %  --   --  51   LYMPHS PCT %  --   --  36   LYMPHO PCT % 15  --   --    MONOS PCT %  --   --  13*   MONO PCT MAN % 5  --   --    EOS PCT %  --   --  0   EOSINO PCT MANUAL % 0  --   --    < > = values in this interval not displayed  Results from last 7 days  Lab Units 05/19/18  0707 05/18/18  0727   SODIUM mmol/L 137 138   POTASSIUM mmol/L 4 7 4 5   CHLORIDE mmol/L 105 106   CO2 mmol/L 27 25   BUN mg/dL 44* 44*   CREATININE mg/dL 2 06* 1 86*   CALCIUM mg/dL 9 0 9 0   TOTAL PROTEIN g/dL  --  4 5*   BILIRUBIN TOTAL mg/dL  --  3 26*   ALK PHOS U/L  --  245*   ALT U/L  --  54   AST U/L  --  103*   GLUCOSE RANDOM mg/dL 151* 145*       Results from last 7 days  Lab Units 05/18/18  0008   INR  1 37*       * I Have Reviewed All Lab Data Listed Above  * Additional Pertinent Lab Tests Reviewed:  All Labs Within Last 24 Hours Reviewed    Imaging:    Imaging Reports Reviewed Today Include:  None  Imaging Personally Reviewed by Myself Includes:  None    Recent Cultures (last 7 days):       Results from last 7 days  Lab Units 05/16/18  2303   URINE CULTURE  >100,000 cfu/ml Escherichia coli*       Last 24 Hours Medication List:     Current Facility-Administered Medications:  acetaminophen 650 mg Oral Q6H PRN Alda King MD    amLODIPine 2 5 mg Oral Daily Anayeli Nika, CRNP    aspirin 81 mg Oral Daily Anayeli Nika, CRNP    calcium carbonate 1,000 mg Oral Daily PRN Anayeli Nika CRNP    calcium carbonate-vitamin D 1 tablet Oral Daily Anayeli Nika, CRNP    cefTRIAXone 1,000 mg Intravenous Q24H KISHORE Morelos Last Rate: 1,000 mg (05/18/18 2222)   cyanocobalamin 1,000 mcg Oral Daily KISHORE Morelos    docusate sodium 100 mg Oral BID KISHORE Morelos    insulin lispro 1-5 Units Subcutaneous HS KISHORE Morelos    insulin lispro 1-6 Units Subcutaneous TID AC KISHORE Bettencourt    phenol 1 spray Mouth/Throat Q2H PRN Mary Lala MD    tamsulosin 0 4 mg Oral Daily With Tiffanie Rios MD         Today, Patient Was Seen By: Mary Lala MD    ** Please Note: Dragon 360 Dictation voice to text software may have been used in the creation of this document   **

## 2018-05-19 NOTE — PHYSICAL THERAPY NOTE
Physical Therapy Evaluation:    2 forms of pt ID verified:name,birthdate and pt ID carmen    Patient's Name: Cl Parsons    Admitting Diagnosis  Hyperbilirubinemia [E80 6]  Emphysematous cystitis [N30 80]  Falls frequently [R29 6]  Liver metastases ([facility] ) [C78 7]  Transaminitis [R74 0]  Edema of both legs [R60 0]  Elbow wound [S51 009A]  Leg wound, right, subsequent encounter [S81 801D]  Malignant neoplasm metastatic to lung, unspecified laterality ([facility] ) [C78 00]  Unspecified multiple injuries, initial encounter [T07  XXXA]    Problem List  Patient Active Problem List   Diagnosis    Cancer, metastatic to lung ([facility] )    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)    Liver metastases (HCC)    Bone metastasis (HCC)    Metastatic breast cancer (HCC)    CKD (chronic kidney disease)    Altered mental status    Syncope    Pancytopenia ([facility] )    Bilateral lower extremity edema    Type 2 diabetes mellitus without complication, with long-term current use of insulin ([facility] )    Head contusion    Anemia, deficiency    Benign essential hypertension    Contusion    Diabetes mellitus ([facility] )    Hemochromatosis    Malignant neoplasm of central portion of right female breast ([facility] )    Acute urinary retention       Past Medical History  Past Medical History:   Diagnosis Date    Anemia     Bone metastases ([address] )     Breast cancer ([facility] )     Diabetes mellitus ([facility] )     Hemochromatosis     Hypertension     Liver metastases ([facility] )        Past Surgical History  No past surgical history on file       05/19/18 5634   Note Type   Note type Eval only   Pain Assessment   Pain Assessment FLACC   Pain Rating: FLACC (Rest) - Face 0   Pain Rating: FLACC (Rest) - Legs 0   Pain Rating: FLACC (Rest) - Activity 0   Pain Rating: FLACC (Rest) - Cry 0   Pain Rating: FLACC (Rest) - Consolability 0   Score: FLACC (Rest) 0   Pain Rating: FLACC (Activity) - Face 1   Pain Rating: FLACC (Activity) - Legs 0   Pain Rating: FLACC (Activity) - Activity 0   Pain Rating: FLACC (Activity) - Cry 0   Pain Rating: FLACC (Activity) - Consolability 1   Score: FLACC (Activity) 2   Home Living   Type of Home Assisted living  ([address])   Home Layout One level   Home Equipment Walker  (use of RW for mobility PTA)   Additional Comments pt reports use of RW PTA for mobility,A from staff of [facility] PTA,(+)recent falls   Prior Function   Level of [place] Independent with ADLs and functional mobility  (per pt PTA)   Lives With Other (Comment)  (residents and staff at [facility])   [address] Help From Family;Personal care attendant  (family and staff as needed)   ADL Assistance Independent   IADLs Needs assistance   Falls in the last 6 months 1 to 4   Restrictions/Precautions   Other Precautions Cognitive; Fall Risk;Multiple lines;Hard of hearing   General   Additional Pertinent History syncope,s/p fall,liver metastases   Family/Caregiver Present No   Cognition   Overall Cognitive Status Impaired   Arousal/Participation Cooperative   Attention Attends with cues to redirect   Orientation Level Oriented to person;Oriented to place   Following Commands Follows one step commands with increased time or repetition   RLE Assessment   RLE Assessment (3+/5 grossly throughout)   LLE Assessment   LLE Assessment (3+/5 grossly throughout)   Coordination   Movements are Fluid and Coordinated 0   Coordination and Movement Description ataxic and unsteady,shuffling gait pattern,dec BLE step length,forward flexed posture   Sensation WFL   Light Touch   RLE Light Touch Grossly intact   LLE Light Touch Grossly intact   Bed Mobility   Supine to Sit 3  Moderate assistance   Additional items Assist x 1;HOB elevated; Bedrails; Increased time required;Verbal cues;LE management   Transfers   Sit to Stand 3  Moderate assistance   Additional items Assist x 1;Bedrails; Increased time required;Verbal cues   Stand to Sit 3  Moderate assistance   Additional items Assist x 1;Armrests; Increased time required;Verbal cues  (for safety,education and control descent)   Toilet transfer 3  Moderate assistance   Additional items Assist x 1; Armrests; Increased time required;Commode   Ambulation/Elevation   Gait pattern Poor UE support; Improper Weight shift;Narrow VASILIY; Forward Flexion; Shuffling; Inconsistent sam; Foward flexed; Short stride; Ataxia; Step to;Excessively slow   Gait Assistance 3  Moderate assist   Additional items Assist x 1;Verbal cues; Tactile cues   Assistive Device Rolling walker   Distance 5 steps bed->BS with use of RW on tile surface;pt reports needing to void   Balance   Static Sitting Good  (on BS with call bell in reach and NSG aware (Inae))   Dynamic Sitting Poor   Static Standing Poor   Dynamic Standing Poor   Ambulatory Poor   Endurance Deficit   Endurance Deficit Yes   Endurance Deficit Description weakness,fatigue,deconditioning   Activity Tolerance   Activity Tolerance Patient limited by fatigue  (poor)   Nurse Made Aware yes (Inae)   Assessment   Prognosis Guarded   Problem List Decreased strength;Decreased endurance; Impaired balance;Decreased mobility; Decreased cognition;Decreased safety awareness; Impaired hearing;Decreased skin integrity   Assessment Pt is a 81 y/o female admitted to [facility] 2* s/p frequent falls,syncope and liver metastases  Pt resides at [facility] with use of RW for mobility,(+)falls  Pt currently is not at functional mobility baseline,needs Ax1 for mobility,use of RW for mobility,ataxic and unsteady gait pattern,multiple lines,h/o falls and at risk for falls,dec cognition and ongoing medical care  Pt demonstrates moderate deficits during functional mobility and gait including dec endurance,dec balance,dec BLE strength,unsteady and ataxic gait pattern and needs modAx1 for BM,transfers and gait with use of RW   Pt would cont to benefit from skilled inpt PT services to maximize functional independence   Goals   Patient Goals to use the BR STG Expiration Date 05/29/18   Short Term Goal #1 in 7-10 days:pt will be able to ambulate >100 feet with use of RW on various surfaces minAx1 and chair follow as needed,activity tolerance:45mins/45mins,inc balance 1 grade,BM and transfers minAx1 to and from various surfaces consistently,I with BLE ther ex HEP   Treatment Day 0   Plan   Treatment/Interventions Functional transfer training; Endurance training;Patient/family training;Equipment eval/education; Bed mobility;Gait training;Spoke to nursing   PT Frequency 5x/wk   Recommendation   Recommendation Other (Comment)  (inpt rhb)   Equipment Recommended Walker  (RW)   Barthel Index   Feeding 5   Bathing 0   Grooming Score 0   Dressing Score 5   Bladder Score 10   Bowels Score 10   Toilet Use Score 5   Transfers (Bed/Chair) Score 5   Mobility (Level Surface) Score 0   Stairs Score 0   Barthel Index Score 40   Skilled PT recommended while in hospital and upon DC to progress pt toward treatment goals         Lucila Nieves, PT, DPT@

## 2018-05-19 NOTE — ASSESSMENT & PLAN NOTE
Due to significant metastatic disease and renal disease and possible chemotherapy?   Continue with follow-up as outpatient with Oncology, family to discuss goal of care with Dr Isabella Conway  If no further aggressive option for cancer, patient and family will not be against hospice level of care  For now the plan is to discussed with primary Oncology, discharged the patient to rehabilitation center with possible transition to hospice at later time  Patient evaluated by palliative care group, appreciate recommendations, code status changed from full code to DNR level 3  CBC remained stable today

## 2018-05-19 NOTE — ED PROVIDER NOTES
History  Chief Complaint   Patient presents with    Fall     Per pt she fell and was not dizzy, pt poor historian  Pt hx of dementia  Pt has had 4 previous falls  This is a 80-year-old female with past medical history of dementia and multiple falls who presents to the emergency department this afternoon after a fall  Patient is a poor historian secondary to her dementia and her review of systems unreliable  She does not remember how she fell but states that she did not feel any palpitations before hand nor did she lose consciousness  Patient gets around at her assisted living facility in [address] with a walker  Patient also has a history of metastatic disease per the daughter and son-in-law  Prior to Admission Medications   Prescriptions Last Dose Informant Patient Reported? Taking? Calcium Carbonate-Vitamin D3 (CALCIUM 600/VITAMIN D) 600-400 MG-UNIT TABS 5/16/2018 at Unknown time Child Yes Yes   Sig: Take 1 tablet by mouth daily   amLODIPine (NORVASC) 2 5 mg tablet 5/16/2018 at Unknown time Child Yes Yes   Sig: Take 2 5 mg by mouth daily     aspirin (ECOTRIN LOW STRENGTH) 81 mg EC tablet 5/16/2018 at Unknown time  Yes Yes   Sig: Take 81 mg by mouth daily   cyanocobalamin (VITAMIN B-12) 1,000 mcg tablet 5/16/2018 at Unknown time  Yes Yes   Sig: Take 1,000 mcg by mouth daily   dextromethorphan-guaiFENesin (ROBAFEN DM)  mg/5 mL oral syrup 5/16/2018 at Unknown time  Yes Yes   Sig: Take 5 mL by mouth as needed   glucose 4 g chewable tablet Unknown at Unknown time  Yes No   Sig: Chew as needed for low blood sugar   insulin aspart (NovoLOG) 100 units/mL injection Unknown at Unknown time Child Yes No   Sig: Inject under the skin 3 (three) times a day before meals Sliding scale    insulin degludec (TRESIBA FLEXTOUCH) injection pen 100 units/mL Unknown at Unknown time  Yes No   Sig: Inject 15 Units under the skin daily at bedtime   palbociclib (IBRANCE) 100 MG capsule Unknown at Unknown time  No No   Sig: Take 1 capsule (100 mg total) by mouth daily x 21 days then off for 7 days      Facility-Administered Medications: None       Past Medical History:   Diagnosis Date    Anemia     Bone metastases ([facility] )     Breast cancer ([facility] )     Diabetes mellitus ([facility] )     Hemochromatosis     Hypertension     Liver metastases ([facility] )        No past surgical history on file  No family history on file  I have reviewed and agree with the history as documented  Social History   Substance Use Topics    Smoking status: Never Smoker    Smokeless tobacco: Never Used    Alcohol use No        Review of Systems   Unable to perform ROS: Dementia       Physical Exam  ED Triage Vitals   Temperature Pulse Respirations Blood Pressure SpO2   05/16/18 1713 05/16/18 1713 05/16/18 1713 05/16/18 1713 05/16/18 1713   (!) 97 2 °F (36 2 °C) 100 18 (!) 140/109 98 %      Temp Source Heart Rate Source Patient Position - Orthostatic VS BP Location FiO2 (%)   05/17/18 0830 05/16/18 2027 05/16/18 2027 05/16/18 2027 --   Oral Monitor Lying Left arm       Pain Score       05/16/18 1713       No Pain           Orthostatic Vital Signs  Vitals:    05/18/18 1520 05/19/18 0234 05/19/18 0700 05/19/18 1500   BP: 117/59 115/60 117/56 167/83   Pulse: 100 99 101 104   Patient Position - Orthostatic VS: Lying Lying Lying Sitting       Physical Exam   Constitutional: She is oriented to person, place, and time  She appears well-developed and well-nourished  No distress  HENT:   Head: Normocephalic  Right Ear: External ear normal    Left Ear: External ear normal    Mouth/Throat: Oropharynx is clear and moist    Large cephalhematoma measuring approximately 5 cm in diameter the patient's forehead with no disruption of the skin  Eyes: Conjunctivae and EOM are normal  Pupils are equal, round, and reactive to light  Right eye exhibits no discharge  Left eye exhibits no discharge  Neck: Normal range of motion     Cardiovascular: Normal rate, regular rhythm and normal heart sounds  Exam reveals no gallop and no friction rub  No murmur heard  Pulmonary/Chest: Effort normal and breath sounds normal  No stridor  No respiratory distress  She has no wheezes  She has no rales  Abdominal: Soft  Bowel sounds are normal  She exhibits no distension  There is no tenderness  There is no guarding  Musculoskeletal: Normal range of motion  Neurological: She is alert and oriented to person, place, and time  No cranial nerve deficit or sensory deficit  She exhibits normal muscle tone  Patient is oriented x3 but did not know who the president is  Skin: Skin is warm and dry  She is not diaphoretic  Multiple ecchymoses on patient's bilateral legs, abdomen, upper extremities, and back  There are two punctate lesions measuring half a cm in diameter at the most on the patient's left elbow that are full-thickness and extend down to the fascia  Psychiatric: She has a normal mood and affect  Her behavior is normal    Nursing note and vitals reviewed        ED Medications  Medications   amLODIPine (NORVASC) tablet 2 5 mg (2 5 mg Oral Given 5/19/18 0937)   aspirin (ECOTRIN LOW STRENGTH) EC tablet 81 mg (81 mg Oral Given 5/19/18 0937)   calcium carbonate-vitamin D (OSCAL-D) 500 mg-200 units per tablet 1 tablet (1 tablet Oral Given 5/19/18 0937)   cyanocobalamin (VITAMIN B-12) tablet 1,000 mcg (1,000 mcg Oral Given 5/19/18 0937)   docusate sodium (COLACE) capsule 100 mg (100 mg Oral Given 5/19/18 1727)   calcium carbonate (TUMS) chewable tablet 1,000 mg (not administered)   cefTRIAXone (ROCEPHIN) IVPB (premix) 1,000 mg (1,000 mg Intravenous New Bag 5/18/18 2222)   insulin lispro (HumaLOG) 100 units/mL subcutaneous injection 1-6 Units (1 Units Subcutaneous Given 5/19/18 1728)   insulin lispro (HumaLOG) 100 units/mL subcutaneous injection 1-5 Units (2 Units Subcutaneous Given 5/18/18 2222)   acetaminophen (TYLENOL) tablet 650 mg (not administered)   phenol (CHLORASEPTIC) 1 4 % mucosal liquid 1 spray (not administered)   tamsulosin (FLOMAX) capsule 0 4 mg (0 4 mg Oral Given 5/19/18 1727)   lidocaine (PF) (XYLOCAINE-MPF) 1 % injection 5 mL (5 mL Infiltration Given by Other 5/16/18 2019)   cefTRIAXone (ROCEPHIN) IVPB (premix) 1,000 mg (0 mg Intravenous Stopped 5/17/18 0059)   tbo-filgrastim (GRANIX) subcutnaeous injection 300 mcg (300 mcg Subcutaneous Given 5/17/18 0955)   potassium-sodium phosphateS (K-PHOS,PHOSPHA 250) -250 mg tablet 2 tablet (2 tablets Oral Given 5/19/18 1001)       Diagnostic Studies  Results Reviewed     Procedure Component Value Units Date/Time    Urine culture [[id number]]  (Abnormal)  (Susceptibility) Collected:  05/16/18 2303    Lab Status:  Final result Specimen:  Urine from Urine, Other Updated:  05/19/18 0940     Urine Culture >100,000 cfu/ml Escherichia coli (A)    Susceptibility      Escherichia coli     AISHWARYA    Ampicillin ($$) <=8 00 ug/ml Susceptible    Aztreonam ($$$)  <=4 ug/ml Susceptible    Cefazolin ($) 4 00 ug/ml Susceptible    Ciprofloxacin ($)  <=1 00 ug/ml Susceptible    Gentamicin ($$) 2 ug/ml Susceptible    Levofloxacin ($) <=0 25 ug/ml Susceptible    Nitrofurantoin <=32 ug/ml Susceptible    Tetracycline <=4 ug/ml Susceptible    Tobramycin ($) 4 ug/ml Susceptible    Trimethoprim + Sulfamethoxazole ($$$) <=2/38 ug/ml Susceptible    ZID Performed Yes                     CBC [[id number]]  (Abnormal) Collected:  05/18/18 0824    Lab Status:  Final result Specimen:  Blood from Arm, Right Updated:  05/18/18 0900     WBC 3 27 (L) Thousand/uL      RBC 2 60 (L) Million/uL      Hemoglobin 10 2 (L) g/dL      Hematocrit 29 5 (L) %       (H) fL      MCH 39 2 (H) pg      MCHC 34 6 g/dL      RDW 17 8 (H) %      Platelets 58 (L) Thousands/uL      MPV 10 5 fL     Narrative:        rvw [id number]< br>plt was [id number]    Protime-INR [[id number]]  (Abnormal) Collected:  05/18/18 0008    Lab Status:  Final result Specimen:  Blood from Arm, Left Updated:  05/18/18 0031     Protime 17 0 (H) seconds      INR 1 37 (H)    APTT [[id number]]  (Normal) Collected:  05/18/18 0008    Lab Status:  Final result Specimen:  Blood from Arm, Left Updated:  05/18/18 0031     PTT 31 seconds     Fingerstick Glucose (POCT) [[id number]]  (Abnormal) Collected:  05/17/18 1737    Lab Status:  Final result Updated:  05/17/18 1739     POC Glucose 163 (H) mg/dl     T3, free [[id number]]  (Abnormal) Collected:  05/17/18 1610    Lab Status:  Final result Specimen:  Blood Updated:  05/17/18 1610     T3, Free 1 48 (L) pg/mL     T4, free [[id number]]  (Normal) Collected:  05/17/18 1610    Lab Status:  Final result Specimen:  Blood Updated:  05/17/18 1610     Free T4 1 29 ng/dL     T4, free [[id number]]  (Normal) Collected:  05/17/18 0711    Lab Status:  Final result Specimen:  Blood from Hand, Left Updated:  05/17/18 1146     Free T4 1 22 ng/dL     Fingerstick Glucose (POCT) [[id number]]  (Abnormal) Collected:  05/17/18 1100    Lab Status:  Final result Updated:  05/17/18 1101     POC Glucose 193 (H) mg/dl     CBC and differential [[id number]]  (Abnormal) Collected:  05/17/18 0711    Lab Status:  Final result Specimen:  Blood from Hand, Left Updated:  05/17/18 0806     WBC 1 52 (LL) Thousand/uL      RBC 2 23 (L) Million/uL      Hemoglobin 8 5 (L) g/dL      Hematocrit 24 7 (L) %       (H) fL      MCH 38 1 (H) pg      MCHC 34 4 g/dL      RDW 17 2 (H) %      MPV 8 9 fL      Platelets 52 (L) Thousands/uL      nRBC 0 /100 WBCs      Neutrophils Relative 51 %      Immat GRANS % 0 %      Lymphocytes Relative 36 %      Monocytes Relative 13 (H) %      Eosinophils Relative 0 %      Basophils Relative 1 %      Neutrophils Absolute 0 77 (L) Thousands/µL      Immature Grans Absolute 0 00 Thousand/uL      Lymphocytes Absolute 0 54 (L) Thousands/µL      Monocytes Absolute 0 20 Thousand/µL      Eosinophils Absolute 0 00 Thousand/µL      Basophils Absolute 0 01 Thousands/µL     Hemoglobin A1C [[id number]] (Abnormal) Collected:  05/17/18 0711    Lab Status:  Final result Specimen:  Blood from Arm, Left Updated:  05/17/18 0757     Hemoglobin A1C 7 5 (H) %       mg/dl     TSH, 3rd generation [[id number]]  (Abnormal) Collected:  05/17/18 0711    Lab Status:  Final result Specimen:  Blood from Hand, Left Updated:  05/17/18 0754     TSH 3RD GENERATON 5 760 (H) uIU/mL     Narrative:         Patients undergoing fluorescein dye angiography may retain small amounts of fluorescein in the body for 48-72 hours post procedure  Samples containing fluorescein can produce falsely depressed TSH values  If the patient had this procedure,a specimen should be resubmitted post fluorescein clearance  The recommended reference ranges for TSH during pregnancy are as follows:  First trimester 0 1 to 2 5 uIU/mL  Second trimester  0 2 to 3 0 uIU/mL  Third trimester 0 3 to 3 0 uIU/m      Comprehensive metabolic panel [[id number]]  (Abnormal) Collected:  05/17/18 0711    Lab Status:  Final result Specimen:  Blood from Hand, Left Updated:  05/17/18 0754     Sodium 139 mmol/L      Potassium 4 2 mmol/L      Chloride 104 mmol/L      CO2 26 mmol/L      Anion Gap 9 mmol/L      BUN 48 (H) mg/dL      Creatinine 2 11 (H) mg/dL      Glucose 157 (H) mg/dL      Calcium 9 4 mg/dL       (H) U/L      ALT 57 U/L      Alkaline Phosphatase 250 (H) U/L      Total Protein 4 7 (L) g/dL      Albumin 1 8 (L) g/dL      Total Bilirubin 2 99 (H) mg/dL      eGFR 20 ml/min/1 73sq m     Narrative:         National Kidney Disease Education Program recommendations are as follows:  GFR calculation is accurate only with a steady state creatinine  Chronic Kidney disease less than 60 ml/min/1 73 sq  meters  Kidney failure less than 15 ml/min/1 73 sq  meters      Magnesium [[id number]]  (Normal) Collected:  05/17/18 0711    Lab Status:  Final result Specimen:  Blood from Hand, Left Updated:  05/17/18 0754     Magnesium 2 1 mg/dL     Fingerstick Glucose (POCT) [[id number]]  (Abnormal) Collected:  05/17/18 0736    Lab Status:  Final result Updated:  05/17/18 0738     POC Glucose 193 (H) mg/dl     Troponin I [[id number]]  (Normal) Collected:  05/17/18 0415    Lab Status:  Final result Specimen:  Blood from Arm, Right Updated:  05/17/18 0447     Troponin I <0 02 ng/mL     Narrative:         [employer] Chemistry analyzer 99% cutoff is > 0 04 ng/mL in network labs    o cTnI 99% cutoff is useful only when applied to patients in the clinical setting of myocardial ischemia  o cTnI 99% cutoff should be interpreted in the context of clinical history, ECG findings and possibly cardiac imaging to establish correct diagnosis  o cTnI 99% cutoff may be suggestive but clearly not indicative of a coronary event without the clinical setting of myocardial ischemia  Fingerstick Glucose (POCT) [[id number]]  (Abnormal) Collected:  05/17/18 0107    Lab Status:  Final result Updated:  05/17/18 0149     POC Glucose 255 (H) mg/dl     Troponin I [[id number]]  (Normal) Collected:  05/17/18 0056    Lab Status:  Final result Specimen:  Blood from Arm, Left Updated:  05/17/18 0125     Troponin I <0 02 ng/mL     Narrative:         [employer] Chemistry analyzer 99% cutoff is > 0 04 ng/mL in network labs    o cTnI 99% cutoff is useful only when applied to patients in the clinical setting of myocardial ischemia  o cTnI 99% cutoff should be interpreted in the context of clinical history, ECG findings and possibly cardiac imaging to establish correct diagnosis  o cTnI 99% cutoff may be suggestive but clearly not indicative of a coronary event without the clinical setting of myocardial ischemia      POCT urinalysis dipstick [[id number]]  (Normal) Resulted:  05/16/18 2242    Lab Status:  Final result Specimen:  Urine Updated:  05/17/18 0028     Color, UA Magui     Clarity, UA clear    Urine Microscopic [[id number]]  (Abnormal) Collected:  05/16/18 2303    Lab Status:  Final result Specimen:  Urine from Urine, Other Updated: 05/16/18 2326     RBC, UA None Seen /hpf      WBC, UA 10-20 (A) /hpf      Epithelial Cells None Seen /hpf      Bacteria, UA Occasional /hpf      Hyaline Casts, UA None Seen /lpf     ED Urine Macroscopic [[id number]]  (Abnormal) Collected:  05/16/18 2303    Lab Status:  Final result Specimen:  Urine Updated:  05/16/18 2258     Color, UA Brown     Clarity, UA Cloudy     pH, UA 5 5     Leukocytes, UA Small (A)     Nitrite, UA Negative     Protein, UA Trace (A) mg/dl      Glucose, UA Negative mg/dl      Ketones, UA Negative mg/dl      Urobilinogen, UA 4 0 (A) E U /dl      Bilirubin, UA Interference- unable to analyze (A)     Blood, UA Moderate (A)     Specific [place], UA 1 020    Narrative:       CLINITEK RESULT    Folate [[id number]]  (Normal) Collected:  05/16/18 1848    Lab Status:  Final result Specimen:  Blood from Arm, Right Updated:  05/16/18 2049     Folate 13 8 ng/mL     Vitamin B12 [[id number]]  (Abnormal) Collected:  05/16/18 1848    Lab Status:  Final result Specimen:  Blood from Arm, Right Updated:  05/16/18 2049     Vitamin B-12 >6,000 (H) pg/mL     Comprehensive metabolic panel [[id number]]  (Abnormal) Collected:  05/16/18 1848    Lab Status:  Final result Specimen:  Blood from Arm, Right Updated:  05/16/18 1925     Sodium 136 mmol/L      Potassium 4 6 mmol/L      Chloride 102 mmol/L      CO2 25 mmol/L      Anion Gap 9 mmol/L      BUN 51 (H) mg/dL      Creatinine 2 45 (H) mg/dL      Glucose 251 (H) mg/dL      Calcium 9 7 mg/dL       (H) U/L      ALT 69 U/L      Alkaline Phosphatase 323 (H) U/L      Total Protein 5 5 (L) g/dL      Albumin 2 2 (L) g/dL      Total Bilirubin 3 65 (H) mg/dL      eGFR 17 ml/min/1 73sq m     Narrative:         National Kidney Disease Education Program recommendations are as follows:  GFR calculation is accurate only with a steady state creatinine  Chronic Kidney disease less than 60 ml/min/1 73 sq  meters  Kidney failure less than 15 ml/min/1 73 sq  meters      Troponin I [[id number]]  (Normal) Collected:  05/16/18 1848    Lab Status:  Final result Specimen:  Blood from Arm, Right Updated:  05/16/18 1918     Troponin I <0 02 ng/mL     Narrative:         [employer] Chemistry analyzer 99% cutoff is > 0 04 ng/mL in network labs    o cTnI 99% cutoff is useful only when applied to patients in the clinical setting of myocardial ischemia  o cTnI 99% cutoff should be interpreted in the context of clinical history, ECG findings and possibly cardiac imaging to establish correct diagnosis  o cTnI 99% cutoff may be suggestive but clearly not indicative of a coronary event without the clinical setting of myocardial ischemia  CBC and differential [[id number]]  (Abnormal) Collected:  05/16/18 1726    Lab Status:  Final result Specimen:  Blood from Arm, Right Updated:  05/16/18 1744     WBC 3 15 (L) Thousand/uL      RBC 2 88 (L) Million/uL      Hemoglobin 11 3 (L) g/dL      Hematocrit 32 5 (L) %       (H) fL      MCH 39 2 (H) pg      MCHC 34 8 g/dL      RDW 17 4 (H) %      MPV 11 2 fL      Platelets 99 (L) Thousands/uL      nRBC 1 /100 WBCs      Neutrophils Relative 62 %      Immat GRANS % 0 %      Lymphocytes Relative 26 %      Monocytes Relative 11 %      Eosinophils Relative 0 %      Basophils Relative 0 %      Neutrophils Absolute 1 96 Thousands/µL      Immature Grans Absolute 0 01 Thousand/uL      Lymphocytes Absolute 0 82 Thousands/µL      Monocytes Absolute 0 34 Thousand/µL      Eosinophils Absolute 0 01 Thousand/µL      Basophils Absolute 0 01 Thousands/µL                  VAS lower limb venous duplex study, complete bilateral   Final Result by Lubna Calle MD (05/17 1791)      CT abdomen pelvis wo contrast   Final Result by Sherlyn Landa MD (05/16 1189)         1  Emphysematous cystitis  2   Worsening osseous metastatic disease throughout the axial skeleton with mild to moderate ascites in the abdomen and pelvis  3   Stable splenic lesions likely benign cysts and/or hemangiomas  Previously described liver metastases inconspicuous on this unenhanced study  I personally discussed this study with ER Resident liana Mcdonald on 5/16/2018 at 10:02 PM                         Workstation performed: [id number]         X-ray chest 2 views   Final Result by Aleksey De Santiago MD (05/16 1935)      Bibasilar atelectasis  Workstation performed: [id number]         CT cervical spine without contrast   Final Result by Brielle Garcia MD (05/16 1846)      No cervical spine fracture or traumatic malalignment  Diffuse osseous metastatic disease  Workstation performed: [id number]         CT head without contrast   Final Result by Brielle Garcia MD (05/16 1840)         1  No acute intracranial abnormality  2   Frontal scalp hematoma with no underlying skull fracture  Stable lytic skull metastases  Workstation performed: [id number]               Procedures  Lac Repair  Date/Time: 5/19/2018 7:59 PM  Performed by: Caren Munoz  Authorized by: Geneva Leon   Consent: Verbal consent obtained  Written consent not obtained    Risks and benefits: risks, benefits and alternatives were discussed  Consent given by: patient  Patient identity confirmed: verbally with patient  Body area: upper extremity  Location details: left elbow  Laceration length: 0 5 cm  Foreign bodies: no foreign bodies  Tendon involvement: none  Nerve involvement: none  Vascular damage: no  Anesthesia: local infiltration    Anesthesia:  Local Anesthetic: lidocaine 1% without epinephrine  Anesthetic total: 3 mL    Sedation:  Patient sedated: no    Wound Dehiscence:  Superficial Wound Dehiscence: simple closure      Procedure Details:  Irrigation solution: saline and tap water  Irrigation method: syringe  Amount of cleaning: standard  Debridement: none  Degree of undermining: none  Skin closure: 4-0 nylon  Number of sutures: 2  Technique: simple  Approximation: close  Approximation difficulty: simple  Dressing: non-adhesive packing strip  Patient tolerance: Patient tolerated the procedure well with no immediate complications            Phone Consults  ED Phone Contact    ED Course  ED Course as of May 19 2002   Wed May 16, 2018   1745 MCV: (!) 113   1746 Platelets: (!) 80           Identification of Seniors at Risk      Most Recent Value   (ISAR) Identification of Seniors at Risk   Before the illness or injury that brought you to the Emergency, did you need someone to help you on a regular basis? 0 Filed at: 05/16/2018 1715   In the last 24 hours, have you needed more help than usual?  0 Filed at: 05/16/2018 1715   Have you been hospitalized for one or more nights during the past 6 months? 0 Filed at: 05/16/2018 1715   In general, do you see well?  0 Filed at: 05/16/2018 1715   In general, do you have serious problems with your memory? 0 Filed at: 05/16/2018 1715   Do you take more than three different medications every day? 1 Filed at: 05/16/2018 1715   ISAR Score  1 Filed at: 05/16/2018 1715                          MDM  Number of Diagnoses or Management Options  Emphysematous cystitis:   Falls frequently:   Hyperbilirubinemia:   Transaminitis:   Diagnosis management comments: Patient's head and neck CAT scans were unremarkable  However her blood work showed a transaminitis and hyperbilirubinemia which prompted us to order a CT scan of her abdomen and pelvis  The CT scan of her abdomen revealed emphysematous cystitis, which combined with her contaminated urine sample prompted us to start ceftriaxone IV get the patient admitted for further management      CritCare Time    Disposition  Final diagnoses:   Emphysematous cystitis   Falls frequently   Transaminitis   Hyperbilirubinemia     Time reflects when diagnosis was documented in both MDM as applicable and the Disposition within this note     Time User Action Codes Description Comment    5/16/2018 10:48 PM Earnest Sierra Add [N30 80] Emphysematous cystitis     5/16/2018 10:48 PM Fabian Ramya Add [R29 6] Falls frequently     5/16/2018 10:48 PM Fabian Ramya Add [R74 0] Transaminitis     5/16/2018 10:48 PM Fabian Ramya Add [E80 6] Hyperbilirubinemia     5/17/2018 12:03 AM Jody Daily Add [C78 00] Malignant neoplasm metastatic to lung, unspecified laterality ([facility] )     5/17/2018 12:03 AM Jody Daily Modify [C78 00] Malignant neoplasm metastatic to lung, unspecified laterality ([facility] )     5/17/2018 12:03 AM Jody Daily Add [C78 7] Liver metastases ([facility] )     5/17/2018 12:03 AM Jody Daily Modify [C78 7] Liver metastases ([facility] )     5/17/2018 12:21 AM Jody Daily Add [X63 420,  M21 962] Acquired deformity of both lower legs     5/17/2018 12:22 AM Jody Daily Add [S81 801D] Leg wound, right, subsequent encounter     5/17/2018 12:22 AM Irena Jose [F38 727,  M21 962] Acquired deformity of both lower legs     5/17/2018 12:22 AM Jody Daily Add [S51 009A] Elbow wound     5/17/2018  9:49 AM Mariana Khalil Add [R60 0] Edema of both legs     5/17/2018  6:43 PM Alla Mcdaniel Add [C77 3] 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[facility] )     5/17/2018  6:43 PM Ahzel Hey Add [C79 51] Bone metastasis ([facility] )     5/17/2018  6:43 PM Hazel Hey Add [C50 919] Metastatic breast cancer [facility])       ED Disposition     ED Disposition Condition Comment    Admit  Case was discussed with PATY and the patient's admission status was agreed to be Admission Status: inpatient status to the service of Dr Drew Grier          Follow-up Information     Follow up With Specialties Details Why Contact Info    Eliot Marquez MD Family Medicine Follow up call to schedule follow up appointment within the next week after discharge  [address] [facility] [address] [facility] [address] Vickie Rosado MD Hematology and Oncology, Hematology, Oncology Follow up please call to schedule follow up within the next week please after discharge  [address] [facility]  [id number]  [phone]            Current Discharge Medication List      CONTINUE these medications which have NOT CHANGED    Details   amLODIPine (NORVASC) 2 5 mg tablet Take 2 5 mg by mouth daily  aspirin (ECOTRIN LOW STRENGTH) 81 mg EC tablet Take 81 mg by mouth daily      Calcium Carbonate-Vitamin D3 (CALCIUM 600/VITAMIN D) 600-400 MG-UNIT TABS Take 1 tablet by mouth daily      cyanocobalamin (VITAMIN B-12) 1,000 mcg tablet Take 1,000 mcg by mouth daily      dextromethorphan-guaiFENesin (ROBAFEN DM)  mg/5 mL oral syrup Take 5 mL by mouth as needed      glucose 4 g chewable tablet Chew as needed for low blood sugar      insulin aspart (NovoLOG) 100 units/mL injection Inject under the skin 3 (three) times a day before meals Sliding scale       insulin degludec (TRESIBA FLEXTOUCH) injection pen 100 units/mL Inject 15 Units under the skin daily at bedtime      palbociclib (IBRANCE) 100 MG capsule Take 1 capsule (100 mg total) by mouth daily x 21 days then off for 7 days  Qty: 21 capsule, Refills: 1    Associated Diagnoses: Malignant neoplasm of central portion of right breast in female, estrogen receptor positive ([facility], [address] )           No discharge procedures on file  ED Provider  Attending physically available and evaluated Gerardocarlton Medina  JADE managed the patient along with the ED Attending      Electronically Signed by         Jero Rangel MD  05/19/18 2002

## 2018-05-19 NOTE — PROGRESS NOTES
PT urinated only small amounts overnight  Bladder scanned at 450 for 500  Per urinary protocol a julio was inserted  SLIM made aware

## 2018-05-20 LAB
ALBUMIN SERPL BCP-MCNC: 1.9 G/DL (ref 3.5–5)
ALP SERPL-CCNC: 278 U/L (ref 46–116)
ALT SERPL W P-5'-P-CCNC: 56 U/L (ref 12–78)
ANION GAP SERPL CALCULATED.3IONS-SCNC: 7 MMOL/L (ref 4–13)
AST SERPL W P-5'-P-CCNC: 98 U/L (ref 5–45)
BASOPHILS # BLD AUTO: 0.02 THOUSANDS/ΜL (ref 0–0.1)
BASOPHILS NFR BLD AUTO: 1 % (ref 0–1)
BILIRUB DIRECT SERPL-MCNC: 3.25 MG/DL (ref 0–0.2)
BILIRUB SERPL-MCNC: 3.97 MG/DL (ref 0.2–1)
BUN SERPL-MCNC: 49 MG/DL (ref 5–25)
CALCIUM SERPL-MCNC: 9.2 MG/DL (ref 8.3–10.1)
CHLORIDE SERPL-SCNC: 103 MMOL/L (ref 100–108)
CO2 SERPL-SCNC: 27 MMOL/L (ref 21–32)
CREAT SERPL-MCNC: 2.26 MG/DL (ref 0.6–1.3)
EOSINOPHIL # BLD AUTO: 0.01 THOUSAND/ΜL (ref 0–0.61)
EOSINOPHIL NFR BLD AUTO: 0 % (ref 0–6)
ERYTHROCYTE [DISTWIDTH] IN BLOOD BY AUTOMATED COUNT: 18.8 % (ref 11.6–15.1)
GFR SERPL CREATININE-BSD FRML MDRD: 19 ML/MIN/1.73SQ M
GLUCOSE SERPL-MCNC: 167 MG/DL (ref 65–140)
GLUCOSE SERPL-MCNC: 198 MG/DL (ref 65–140)
GLUCOSE SERPL-MCNC: 228 MG/DL (ref 65–140)
GLUCOSE SERPL-MCNC: 274 MG/DL (ref 65–140)
HCT VFR BLD AUTO: 26.1 % (ref 34.8–46.1)
HGB BLD-MCNC: 8.9 G/DL (ref 11.5–15.4)
LYMPHOCYTES # BLD AUTO: 0.65 THOUSANDS/ΜL (ref 0.6–4.47)
LYMPHOCYTES NFR BLD AUTO: 19 % (ref 14–44)
MAGNESIUM SERPL-MCNC: 2.1 MG/DL (ref 1.6–2.6)
MCH RBC QN AUTO: 39 PG (ref 26.8–34.3)
MCHC RBC AUTO-ENTMCNC: 34.1 G/DL (ref 31.4–37.4)
MCV RBC AUTO: 115 FL (ref 82–98)
MONOCYTES # BLD AUTO: 0.52 THOUSAND/ΜL (ref 0.17–1.22)
MONOCYTES NFR BLD AUTO: 15 % (ref 4–12)
NEUTROPHILS # BLD AUTO: 2.26 THOUSANDS/ΜL (ref 1.85–7.62)
NEUTS SEG NFR BLD AUTO: 65 % (ref 43–75)
NRBC BLD AUTO-RTO: 1 /100 WBCS
PHOSPHATE SERPL-MCNC: 2.2 MG/DL (ref 2.3–4.1)
PLATELET # BLD AUTO: 54 THOUSANDS/UL (ref 149–390)
PMV BLD AUTO: 12.1 FL (ref 8.9–12.7)
POTASSIUM SERPL-SCNC: 4 MMOL/L (ref 3.5–5.3)
PROT SERPL-MCNC: 4.8 G/DL (ref 6.4–8.2)
RBC # BLD AUTO: 2.28 MILLION/UL (ref 3.81–5.12)
SODIUM SERPL-SCNC: 137 MMOL/L (ref 136–145)
WBC # BLD AUTO: 3.48 THOUSAND/UL (ref 4.31–10.16)

## 2018-05-20 PROCEDURE — 99231 SBSQ HOSP IP/OBS SF/LOW 25: CPT | Performed by: INTERNAL MEDICINE

## 2018-05-20 PROCEDURE — 82948 REAGENT STRIP/BLOOD GLUCOSE: CPT

## 2018-05-20 PROCEDURE — 80048 BASIC METABOLIC PNL TOTAL CA: CPT | Performed by: INTERNAL MEDICINE

## 2018-05-20 PROCEDURE — 84100 ASSAY OF PHOSPHORUS: CPT | Performed by: INTERNAL MEDICINE

## 2018-05-20 PROCEDURE — 85025 COMPLETE CBC W/AUTO DIFF WBC: CPT | Performed by: INTERNAL MEDICINE

## 2018-05-20 PROCEDURE — 80076 HEPATIC FUNCTION PANEL: CPT | Performed by: INTERNAL MEDICINE

## 2018-05-20 PROCEDURE — 83735 ASSAY OF MAGNESIUM: CPT | Performed by: INTERNAL MEDICINE

## 2018-05-20 RX ORDER — ACETAMINOPHEN 325 MG/1
650 TABLET ORAL EVERY 6 HOURS PRN
Qty: 30 TABLET | Refills: 0 | Status: SHIPPED | OUTPATIENT
Start: 2018-05-20

## 2018-05-20 RX ORDER — CEPHALEXIN 250 MG/1
250 CAPSULE ORAL EVERY 8 HOURS SCHEDULED
Status: DISCONTINUED | OUTPATIENT
Start: 2018-05-20 | End: 2018-05-22 | Stop reason: HOSPADM

## 2018-05-20 RX ORDER — CALCIUM CARBONATE 200(500)MG
1000 TABLET,CHEWABLE ORAL DAILY PRN
Qty: 30 TABLET | Refills: 0 | Status: SHIPPED | OUTPATIENT
Start: 2018-05-20

## 2018-05-20 RX ORDER — TAMSULOSIN HYDROCHLORIDE 0.4 MG/1
0.4 CAPSULE ORAL
Qty: 30 CAPSULE | Refills: 0 | Status: SHIPPED | OUTPATIENT
Start: 2018-05-20

## 2018-05-20 RX ORDER — DOCUSATE SODIUM 100 MG/1
100 CAPSULE, LIQUID FILLED ORAL 2 TIMES DAILY
Qty: 10 CAPSULE | Refills: 0 | Status: SHIPPED | OUTPATIENT
Start: 2018-05-20

## 2018-05-20 RX ORDER — CEPHALEXIN 250 MG/1
250 CAPSULE ORAL EVERY 8 HOURS SCHEDULED
Qty: 18 CAPSULE | Refills: 0 | Status: SHIPPED | OUTPATIENT
Start: 2018-05-20 | End: 2018-05-22

## 2018-05-20 RX ADMIN — INSULIN LISPRO 2 UNITS: 100 INJECTION, SOLUTION INTRAVENOUS; SUBCUTANEOUS at 11:39

## 2018-05-20 RX ADMIN — ASPIRIN 81 MG: 81 TABLET, COATED ORAL at 08:24

## 2018-05-20 RX ADMIN — CALCIUM CARBONATE 500 MG (1,250 MG)-VITAMIN D3 200 UNIT TABLET 1 TABLET: at 08:23

## 2018-05-20 RX ADMIN — CYANOCOBALAMIN TAB 500 MCG 1000 MCG: 500 TAB at 08:23

## 2018-05-20 RX ADMIN — DOCUSATE SODIUM 100 MG: 100 CAPSULE, LIQUID FILLED ORAL at 17:14

## 2018-05-20 RX ADMIN — INSULIN LISPRO 1 UNITS: 100 INJECTION, SOLUTION INTRAVENOUS; SUBCUTANEOUS at 17:16

## 2018-05-20 RX ADMIN — CEPHALEXIN 250 MG: 250 CAPSULE ORAL at 10:39

## 2018-05-20 RX ADMIN — DOCUSATE SODIUM 100 MG: 100 CAPSULE, LIQUID FILLED ORAL at 08:24

## 2018-05-20 RX ADMIN — INSULIN LISPRO 4 UNITS: 100 INJECTION, SOLUTION INTRAVENOUS; SUBCUTANEOUS at 08:29

## 2018-05-20 RX ADMIN — ACETAMINOPHEN 650 MG: 325 TABLET, FILM COATED ORAL at 19:46

## 2018-05-20 RX ADMIN — AMLODIPINE BESYLATE 2.5 MG: 2.5 TABLET ORAL at 08:24

## 2018-05-20 RX ADMIN — CEPHALEXIN 250 MG: 250 CAPSULE ORAL at 15:33

## 2018-05-20 RX ADMIN — INSULIN LISPRO 2 UNITS: 100 INJECTION, SOLUTION INTRAVENOUS; SUBCUTANEOUS at 21:38

## 2018-05-20 RX ADMIN — TAMSULOSIN HYDROCHLORIDE 0.4 MG: 0.4 CAPSULE ORAL at 17:14

## 2018-05-20 RX ADMIN — CEPHALEXIN 250 MG: 250 CAPSULE ORAL at 21:37

## 2018-05-20 NOTE — ASSESSMENT & PLAN NOTE
Due to significant metastatic disease and renal disease and possible chemotherapy?   Continue with follow-up as outpatient with Oncology, family to discuss goal of care with Dr Jada Kapoor  If no further aggressive option for cancer, patient and family will not be against hospice level of care  For now the plan is to discussed with primary Oncology, discharged the patient to rehabilitation center with possible transition to hospice at later time  Patient evaluated by palliative care group, appreciate recommendations, code status changed from full code to DNR level 3  CBC remained stable today

## 2018-05-20 NOTE — ASSESSMENT & PLAN NOTE
Patient in need of straight catheterization for 3 attempt  As per protocol Eaton catheter was placed  Continue with Eaton care  Continue with Flomax  Voiding trial at rehabilitation in 5-7 days

## 2018-05-20 NOTE — ASSESSMENT & PLAN NOTE
- Severe B/L lower extremity pitting edema in pt with hx of metastatic cancer and hypalbuminemia  - Prior admission venous duplex performed negative for dvt   - improved with elevation of the lower extremity  - Continue with supportive care  - Might use compression p r n

## 2018-05-20 NOTE — ASSESSMENT & PLAN NOTE
Possible mechanical fall versus true syncope?   As far workup only remarkable for possible urinary tract infection which might have contributed to patient mechanical fall/syncope  Urine culture with E coli sensitive to most common antibiotics, discontinue Rocephin, started Keflex for another 4 days  Fall precautions  PT and OT  Rehabilitation upon discharge

## 2018-05-20 NOTE — PROGRESS NOTES
Progress Note - Adriane Mosley 7/9/1927, 80 y o  female MRN: [id number]    Unit/Bed#: [facility] 618-01 Encounter: [id number]    Primary Care Provider: You Craft MD   Date and time admitted to hospital: 5/16/2018  5:08 PM        Acute urinary retention   Assessment & Plan    Patient in need of straight catheterization for 3 attempt  As per protocol Eaton catheter was placed  Continue with Eaton care  Continue with Flomax  Voiding trial at rehabilitation in 5-7 days        Contusion   Assessment & Plan    Of multiple body parts expression early forehead, upper extremities, lower extremities  Multiple skin tears present on admission  Appreciated wound care consult and recommendations for local care  Fall precautions in place  PT and OT as able  For rehabilitation upon discharge  Supportive care        Head contusion   Assessment & Plan    Slowly improving, supportive care        Type 2 diabetes mellitus without complication, with long-term current use of insulin (HCC)   Assessment & Plan    Continue with insulin at the current dose and monitoring          Bilateral lower extremity edema   Assessment & Plan    - Severe B/L lower extremity pitting edema in pt with hx of metastatic cancer and hypalbuminemia  - Prior admission venous duplex performed negative for dvt   - improved with elevation of the lower extremity  - Continue with supportive care  - Might use compression p r n  Pancytopenia ([address] )   Assessment & Plan    Due to significant metastatic disease and renal disease and possible chemotherapy?   Continue with follow-up as outpatient with Oncology, family to discuss goal of care with Dr Jacqui Olivera  If no further aggressive option for cancer, patient and family will not be against hospice level of care  For now the plan is to discussed with primary Oncology, discharged the patient to rehabilitation center with possible transition to hospice at later time  Patient evaluated by palliative care group, appreciate recommendations, code status changed from full code to DNR level 3  CBC remained stable today        Liver metastases [facility])   Assessment & Plan    Elevated LFT secondary to known liver metastasis  Supportive care  LFTs p r n  * Syncope   Assessment & Plan    Possible mechanical fall versus true syncope? As far workup only remarkable for possible urinary tract infection which might have contributed to patient mechanical fall/syncope  Urine culture with E coli sensitive to most common antibiotics, discontinue Rocephin, started Keflex for another 4 days  Fall precautions  PT and OT  Rehabilitation upon discharge           VTE Pharmacologic Prophylaxis: no  Pharmacologic: Thrombocytopenia  Mechanical VTE Prophylaxis in Place: Yes     Patient Centered Rounds: I have performed bedside rounds with nursing staff today      Discussions with Specialists or Other Care Team Provider:  None yet     Education and Discussions with Family / Patient:  daughter     Time Spent for Care: 15 minutes   More than 50% of total time spent on counseling and coordination of care as described above      Current Length of Stay: 4 day(s)     Current Patient Status: Inpatient   Certification Statement: The patient will continue to require additional inpatient hospital stay due to Please refer to above     Discharge Plan:   Rehabilitation for 1-2 days     Code Status: Level 3 - DNAR and DNI    Subjective:   Patient is feeling well, she has no complaints    Objective:     Vitals:   Temp (24hrs), Av 6 °F (36 4 °C), Min:97 4 °F (36 3 °C), Max:97 9 °F (36 6 °C)    HR:  [] 89  Resp:  [20] 20  BP: (127-167)/(62-83) 127/62  SpO2:  [98 %-99 %] 98 %  Body mass index is 28 46 kg/m²  Input and Output Summary (last 24 hours):        Intake/Output Summary (Last 24 hours) at 18 1452  Last data filed at 18 1411   Gross per 24 hour   Intake              448 ml   Output              900 ml   Net             -452 ml Physical Exam:     Physical Exam   Constitutional: She is oriented to person, place, and time  She appears well-developed  Frontal region bruises evolving   Cardiovascular: Normal rate, regular rhythm and normal heart sounds  Exam reveals no friction rub  No murmur heard  Pulmonary/Chest: Effort normal  No respiratory distress  She has no wheezes  She has no rales  Abdominal: Soft  She exhibits no distension  There is no tenderness  There is no rebound  Musculoskeletal: She exhibits edema (Trace is dorsum pedis)  Neurological: She is alert and oriented to person, place, and time  She exhibits normal muscle tone  Skin: Skin is warm  Multiple bruises throughout the body noted   Psychiatric: She has a normal mood and affect  Additional Data:     Labs:      Results from last 7 days  Lab Units 05/20/18  0456   WBC Thousand/uL 3 48*   HEMOGLOBIN g/dL 8 9*   HEMATOCRIT % 26 1*   PLATELETS Thousands/uL 54*   NEUTROS PCT % 65   LYMPHS PCT % 19   MONOS PCT % 15*   EOS PCT % 0       Results from last 7 days  Lab Units 05/20/18  0913   SODIUM mmol/L 137   POTASSIUM mmol/L 4 0   CHLORIDE mmol/L 103   CO2 mmol/L 27   BUN mg/dL 49*   CREATININE mg/dL 2 26*   CALCIUM mg/dL 9 2   TOTAL PROTEIN g/dL 4 8*   BILIRUBIN TOTAL mg/dL 3 97*   ALK PHOS U/L 278*   ALT U/L 56   AST U/L 98*   GLUCOSE RANDOM mg/dL 198*       Results from last 7 days  Lab Units 05/18/18  0008   INR  1 37*       * I Have Reviewed All Lab Data Listed Above    * Additional Pertinent Lab Tests Reviewed: No New Labs Available For Today    Imaging:    Imaging Reports Reviewed Today Include:  None  Imaging Personally Reviewed by Myself Includes:  None    Recent Cultures (last 7 days):       Results from last 7 days  Lab Units 05/16/18  2303   URINE CULTURE  >100,000 cfu/ml Escherichia coli*       Last 24 Hours Medication List:     Current Facility-Administered Medications:  acetaminophen 650 mg Oral Q6H PRN Shashank Lundberg MD   amLODIPine 2 5 mg Oral Daily Lynnda Crooked, CRNP   aspirin 81 mg Oral Daily Lynnda Crooked, CRNP   calcium carbonate 1,000 mg Oral Daily PRN Lynnda Crooked, CRNP   calcium carbonate-vitamin D 1 tablet Oral Daily Lynnda Crooked, CRNP   cephalexin 250 mg Oral Q8H [address] Elin Gastelum MD   cyanocobalamin 1,000 mcg Oral Daily Lynnda Crooked, CRNP   docusate sodium 100 mg Oral BID Lynnda Crooked, CRNP   insulin lispro 1-5 Units Subcutaneous HS Lynnda Crooked, CRNP   insulin lispro 1-6 Units Subcutaneous TID AC Janki Juarez, KISHORE   phenol 1 spray Mouth/Throat Q2H PRN Elin Gastelum MD   tamsulosin 0 4 mg Oral Daily With Halley Haynes MD        Today, Patient Was Seen By: Elin Gastelum MD    ** Please Note: Dragon 360 Dictation voice to text software may have been used in the creation of this document   **

## 2018-05-20 NOTE — DISCHARGE INSTRUCTIONS
Acute Urinary Retention in Women   WHAT YOU NEED TO KNOW:   What is acute urinary retention? Acute urinary retention (AUR) is when your bladder is full, but you cannot urinate  This condition happens suddenly, gets worse quickly, and lasts a short time  What causes AUR? · Weak bladder muscle    · Blockages, such as a stone or growth    · Nerve damage from diabetes, stroke, or spinal cord injuries    · Swelling or infection, including childbirth, pelvic surgery, or a urinary tract infection    · Certain medicines, such as narcotics, anesthesia, and antidepressants  What are the signs and symptoms of AUR?   · Discomfort or pain in your lower abdomen    · A bloated lower abdomen    · Urge to urinate after you just went    · A urine stream that stops and starts on its own  How is AUR diagnosed? Your healthcare provider will ask about your health history and the medicines you take  He will press or tap on your lower abdomen  You may need any of the following tests:  · A pelvic exam  will be done to check for blockages  A pelvic exam will also show if your bladder, uterus, or other organs have moved out of place  · A post void residual  test will show how much urine is left in your bladder after you urinate  You will be asked to urinate and then healthcare providers will use a small ultrasound machine to check the remaining urine in your bladder  · A neuro exam  may be done to test your strength, balance, and movement  A neuro exam looks for changes in your brain and nervous system  · Urine tests  may be needed to check for blood or infection  · Blood tests  may be needed to check for infection and kidney function  How is AUR treated? · A Eaton catheter  is a tube put into your bladder to drain urine into a bag  Keep the bag below your waist  This will prevent urine from flowing back into your bladder and causing an infection or other problems   Also, keep the tube free of kinks so the urine will drain properly  Do not pull on the catheter  This can cause pain and bleeding, and may cause the catheter to come out  · Antibiotics  help treat or prevent a bacterial infection  When should I contact my healthcare provider? · You have a fever  · You have pain when you urinate  · You see blood in your urine  · You have problems with your catheter  · You have questions or concerns about your condition or care  When should I seek immediate care or call 911? · You have severe abdominal pain  · You are breathing faster than usual     · Your heartbeat is faster than usual     · Your face, hands, feet, or ankles are swollen  CARE AGREEMENT:   You have the right to help plan your care  Learn about your health condition and how it may be treated  Discuss treatment options with your caregivers to decide what care you want to receive  You always have the right to refuse treatment  The above information is an  only  It is not intended as medical advice for individual conditions or treatments  Talk to your doctor, nurse or pharmacist before following any medical regimen to see if it is safe and effective for you  © 2017 [facility] [facility] Information is for End User's use only and may not be sold, redistributed or otherwise used for commercial purposes  All illustrations and images included in CareNotes® are the copyrighted property of [employer]  or Jose Mchugh  Acute Urinary Retention in Women   WHAT YOU NEED TO KNOW:   Acute urinary retention (AUR) is when your bladder is full, but you cannot urinate  This condition happens suddenly, gets worse quickly, and lasts a short time  DISCHARGE INSTRUCTIONS:   Medicines:   · Antibiotics  help treat or prevent a bacterial infection  · Take your medicine as directed  Contact your healthcare provider if you think your medicine is not helping or if you have side effects  Tell him if you are allergic to any medicine  Keep a list of the medicines, vitamins, and herbs you take  Include the amounts, and when and why you take them  Bring the list or the pill bottles to follow-up visits  Carry your medicine list with you in case of an emergency  Eaton catheter care: You may need a Eaton catheter while you are at home  Healthcare providers will give you a smaller leg bag to collect urine  Keep the bag below your waist  This will prevent urine from flowing back into your bladder and causing an infection or other problems  Also, keep the tube free of kinks so the urine will drain properly  Do not pull on the catheter  This can cause pain and bleeding, and may cause the catheter to come out  Ask your healthcare provider for more information on Eaton catheter care  Follow up with your healthcare provider as directed:  Write down your questions so you remember to ask them during your visits  Contact your healthcare provider if:   · You have a fever  · You have pain when you urinate  · You see blood in your urine  · You have problems with your catheter  · You have questions or concerns about your condition or care  Return to the emergency department if:   · You have severe abdominal pain  · You are breathing faster than usual     · Your heartbeat is faster than usual     · Your face, hands, feet, or ankles are swollen  © 2017 [address]  Information is for End User's use only and may not be sold, redistributed or otherwise used for commercial purposes  All illustrations and images included in CareNotes® are the copyrighted property of A D A M [employer]  or Jose Mchugh  The above information is an  only  It is not intended as medical advice for individual conditions or treatments  Talk to your doctor, nurse or pharmacist before following any medical regimen to see if it is safe and effective for you      Contusion in Adults   WHAT YOU NEED TO KNOW:   A contusion is a bruise that appears on your skin after an injury  A bruise happens when small blood vessels tear but skin does not  When blood vessels tear, blood leaks into nearby tissue, such as soft tissue or muscle  DISCHARGE INSTRUCTIONS:   Seek care immediately if:   · You have new trouble moving the injured area  · You have tingling or numbness in or near the injured area  · Your hand or foot below the bruise gets cold or turns pale  Contact your healthcare provider if:   · You find a new lump in the injured area  · Your symptoms do not improve with treatment after 4 to 5 days  · You have questions or concerns about your condition or care  Medicines: You may need any of the following:  · NSAIDs , such as ibuprofen, help decrease swelling, pain, and fever  This medicine is available with or without a doctor's order  NSAIDs can cause stomach bleeding or kidney problems in certain people  If you take blood thinner medicine, always ask your healthcare provider if NSAIDs are safe for you  Always read the medicine label and follow directions  · Prescription pain medicine  may be given  Do not wait until the pain is severe before you take your medicine  · Take your medicine as directed  Contact your healthcare provider if you think your medicine is not helping or if you have side effects  Tell him or her if you are allergic to any medicine  Keep a list of the medicines, vitamins, and herbs you take  Include the amounts, and when and why you take them  Bring the list or the pill bottles to follow-up visits  Carry your medicine list with you in case of an emergency  Follow up with your healthcare provider as directed: You may need to return within a week to check your injury again  Write down your questions so you remember to ask them during your visits  Help a contusion heal:   · Rest the injured area  or use it less than usual  If you bruised your leg or foot, you may need crutches or a cane to help you walk   This will help you keep weight off your injured body part  · Apply ice  to decrease swelling and pain  Ice may also help prevent tissue damage  Use an ice pack, or put crushed ice in a plastic bag  Cover it with a towel and place it on your bruise for 15 to 20 minutes every hour or as directed  · Use compression  to support the area and decrease swelling  Wrap an elastic bandage around the area over the bruised muscle  Make sure the bandage is not too tight  You should be able to fit 1 finger between the bandage and your skin  · Elevate (raise) your injured body part  above the level of your heart to help decrease pain and swelling  Use pillows, blankets, or rolled towels to elevate the area as often as you can  · Do not drink alcohol  as directed  Alcohol may slow healing  · Do not stretch injured muscles  right after your injury  Ask your healthcare provider when and how you may safely stretch after your injury  Gentle stretches can help increase your flexibility  · Do not massage the area or put heating pads  on the bruise right after your injury  Heat and massage may slow healing  Your healthcare provider may tell you to apply heat after several days  At that time, heat will start to help the injury heal   Prevent another contusion:   · Stretch and warm up before you play sports or exercise  · Wear protective gear when you play sports  Examples are shin guards and padding  · If you begin a new physical activity, start slowly to give your body a chance to adjust   © 2017 [address] Information is for End User's use only and may not be sold, redistributed or otherwise used for commercial purposes  All illustrations and images included in CareNotes® are the copyrighted property of A D A M [employer]  or Jose Mchugh  The above information is an  only  It is not intended as medical advice for individual conditions or treatments   Talk to your doctor, nurse or pharmacist before following any medical regimen to see if it is safe and effective for you  Hematoma   WHAT YOU NEED TO KNOW:   A hematoma is a collection of blood  A bruise is a type of hematoma  A hematoma may form in a muscle or in the tissues just under the skin  A hematoma that forms under the skin will feel like a bump or hard mass  Hematomas can happen anywhere in your body, including in your brain  Your body may break down and absorb a mild hematoma on its own  A more serious hematoma may need treatment  DISCHARGE INSTRUCTIONS:   Medicines: You may need any of the following:  · Prescription pain medicine  may be given  Ask how to take this medicine safely  · NSAIDs , such as ibuprofen, help decrease swelling, pain, and fever  This medicine is available with or without a doctor's order  NSAIDs can cause stomach bleeding or kidney problems in certain people  If you take blood thinner medicine, always ask your healthcare provider if NSAIDs are safe for you  Always read the medicine label and follow directions  · Antibiotics  prevent or treat a bacterial infection  · Take your medicine as directed  Contact your healthcare provider if you think your medicine is not helping or if you have side effects  Tell him of her if you are allergic to any medicine  Keep a list of the medicines, vitamins, and herbs you take  Include the amounts, and when and why you take them  Bring the list or the pill bottles to follow-up visits  Carry your medicine list with you in case of an emergency  Return to the emergency department if:   · You have new or worsening pain, or pain that does not get better with medicine  · You have a fever  · You have trouble moving the body part that has the hematoma  Contact your healthcare provider if:   · You have questions or concerns about your condition or care  Follow up with your healthcare provider as directed:   You may need to have surgery if your hematoma is severe  You may also need other tests to make sure there is no other damage that needs to be treated  Write down your questions so you remember to ask them during your visits  Self-care:   · Rest the area  Rest will help your body heal and will also help prevent more damage  · Apply ice as directed  Ice helps reduce swelling  Ice may also help prevent tissue damage  Use an ice pack, or put crushed ice in a bag  Cover it with a towel  Place it on your hematoma for 20 minutes every hour, or as directed  Ask how many times each day to apply ice, and for how many days  · Compress the injury if possible  Lightly wrap the injury with an elastic or soft bandage  This may help control swelling  Ask your healthcare provider how to wrap your injury properly  · Elevate the area as directed  If possible, raise the area above the level of your heart as often as you can  This will help decrease swelling  · Keep the hematoma covered with a bandage  This will help protect the area while it heals  © 2017 [address] [facility] Information is for End User's use only and may not be sold, redistributed or otherwise used for commercial purposes  All illustrations and images included in CareNotes® are the copyrighted property of A D A M , [employer]  or [employer]  The above information is an  only  It is not intended as medical advice for individual conditions or treatments  Talk to your doctor, nurse or pharmacist before following any medical regimen to see if it is safe and effective for you  Pancytopenia   WHAT YOU NEED TO KNOW:   There are several things you can do to help prevent infection and bleeding  You can also balance your activity with rest to prevent extreme tiredness  DISCHARGE INSTRUCTIONS:   Call 911 for any of the following:   · You cannot be woken  · You have a seizure  · You have trouble breathing      · You cannot stop the bleeding from a wound even after you hold firm pressure for 10 minutes  Seek care immediately if:   · You have a fever or chills  · You feel dizzy or you faint  · You have blood in your bowel movements or urine  · Your heart is beating faster than usual   Contact your healthcare provider if:   · You have a rash or red or purple dots on your skin  · You feel more tired than usual      · You have questions or concerns about your condition or care  Medicines:   · Medicines  may be given to treat the cause of pancytopenia  · Take your medicine as directed  Contact your healthcare provider if you think your medicine is not helping or if you have side effects  Tell him of her if you are allergic to any medicine  Keep a list of the medicines, vitamins, and herbs you take  Include the amounts, and when and why you take them  Bring the list or the pill bottles to follow-up visits  Carry your medicine list with you in case of an emergency  Balance activity with rest:  Do activities when your energy levels are the highest  Know your limits and do not plan too many activities for one day  Rest when you need to  Prevent or control bleeding:   · Do not take aspirin or NSAIDs  These medicines can cause you to bleed and bruise more easily  · Use caution with skin and mouth care  Use a soft washcloth and a soft toothbrush  This can keep your skin and gums from bleeding  Keep your nails trimmed to prevent scratches  If you shave, use an electric shaver  · Apply firm, steady pressure to stop bleeding from a wound  Apply pressure with a clean gauze or towel for 5 to 10 minutes  Call 911 if bleeding becomes heavy or does not stop  · Do not play contact sports or do activities that can cause bleeding  Ask your healthcare provider what activities are safe for you to do  Prevent infection:   · Wash your hands often  Use an alcohol-based hand rub if soap and water are not available             · Stay away from crowds and anyone who may be sick  Ask your healthcare provider if you need to wear a mask in public places  · Eat a low-bacteria diet as directed  This will help decrease your risk for an infection  Choose, prepare, and cook foods that contain a low amount of bacteria  Examples include pasteurized milk, well-cooked meats, and cooked pasta  Ask your healthcare provider for more information about a low-bacteria diet  Follow up with your healthcare provider as directed: You will need to return for blood tests frequently  You may also need regular blood transfusions  Write down your questions so you remember to ask them during your visits  © 2017 [address] Information is for End User's use only and may not be sold, redistributed or otherwise used for commercial purposes  All illustrations and images included in CareNotes® are the copyrighted property of [employer] [place]  or Jose Mchugh  The above information is an  only  It is not intended as medical advice for individual conditions or treatments  Talk to your doctor, nurse or pharmacist before following any medical regimen to see if it is safe and effective for you  Syncope   WHAT YOU NEED TO KNOW:   Syncope is also called fainting or passing out  Syncope is a sudden, temporary loss of consciousness, followed by a fall from a standing or sitting position  Syncope ranges from not serious to a sign of a more serious condition that needs to be treated  You can control some health conditions that cause syncope  Your healthcare providers can help you create a plan to manage syncope and prevent episodes  DISCHARGE INSTRUCTIONS:   Seek care immediately if:   · You are bleeding because you hit your head when you fainted  · You suddenly have double vision, difficulty speaking, numbness, and cannot move your arms or legs  · You have chest pain and trouble breathing      · You vomit blood or material that looks like coffee grounds  · You see blood in your bowel movement  Contact your healthcare provider if:   · You have new or worsening symptoms  · You have another syncope episode  · You have a headache, fast heartbeat, or feel too dizzy to stand up  · You have questions or concerns about your condition or care  Follow up with your healthcare provider as directed:  Write down your questions so you remember to ask them during your visits  Manage syncope:   · Keep a record of your syncope episodes  Include your symptoms and your activity before and after the episode  The record can help your healthcare provider find the cause of your syncope and help you manage episodes  · Sit or lie down when needed  This includes when you feel dizzy, your throat is getting tight, and your vision changes  Raise your legs above the level of your heart  · Take slow, deep breaths if you start to breathe faster with anxiety or fear  This can help decrease dizziness and the feeling that you might faint  · Check your blood pressure often  This is important if you take medicine to lower your blood pressure  Check your blood pressure when you are lying down and when you are standing  Ask how often to check during the day  Keep a record of your blood pressure numbers  Your healthcare provider may use the record to help plan your treatment  Prevent a syncope episode:   · Move slowly and let yourself get used to one position before you move to another position  This is very important when you change from a lying or sitting position to a standing position  Take some deep breaths before you stand up from a lying position  Stand up slowly  Sudden movements may cause a fainting spell  Sit on the side of the bed or couch for a few minutes before you stand up  If you are on bedrest, try to be upright for about 2 hours each day, or as directed  Do not lock your legs if you are standing for a long period of time   Move your legs and bend your knees to keep blood flowing  · Follow your healthcare provider's recommendations  Your provider may  recommend that you drink more liquids to prevent dehydration  You may also need to have more salt to keep your blood pressure from dropping too low and causing syncope  Your provider will tell you how much liquid and sodium to have each day  · Watch for signs of low blood sugar  These include hunger, nervousness, sweating, and fast or fluttery heartbeats  Talk with your healthcare provider about ways to keep your blood sugar level steady  · Do not strain if you are constipated  You may faint if you strain to have a bowel movement  Walking is the best way to get your bowels moving  Eat foods high in fiber to make it easier to have a bowel movement  Good examples are high-fiber cereals, beans, vegetables, and whole-grain breads  Prune juice may help make bowel movements softer  · Be careful in hot weather  Heat can cause a syncope episode  Limit activity done outside on hot days  Physical activity in hot weather can lead to dehydration  This can cause an episode  © 2017 [facility] Information is for End User's use only and may not be sold, redistributed or otherwise used for commercial purposes  All illustrations and images included in CareNotes® are the copyrighted property of A D [employer] M [employer]  or Jose Mchugh  The above information is an  only  It is not intended as medical advice for individual conditions or treatments  Talk to your doctor, nurse or pharmacist before following any medical regimen to see if it is safe and effective for you

## 2018-05-21 PROBLEM — B37.0 CANDIDA, ORAL: Status: ACTIVE | Noted: 2018-05-21

## 2018-05-21 LAB
ANION GAP SERPL CALCULATED.3IONS-SCNC: 6 MMOL/L (ref 4–13)
BASOPHILS # BLD AUTO: 0.02 THOUSANDS/ΜL (ref 0–0.1)
BASOPHILS NFR BLD AUTO: 1 % (ref 0–1)
BUN SERPL-MCNC: 48 MG/DL (ref 5–25)
CALCIUM SERPL-MCNC: 9.5 MG/DL (ref 8.3–10.1)
CHLORIDE SERPL-SCNC: 103 MMOL/L (ref 100–108)
CO2 SERPL-SCNC: 27 MMOL/L (ref 21–32)
CREAT SERPL-MCNC: 2.12 MG/DL (ref 0.6–1.3)
EOSINOPHIL # BLD AUTO: 0.01 THOUSAND/ΜL (ref 0–0.61)
EOSINOPHIL NFR BLD AUTO: 1 % (ref 0–6)
ERYTHROCYTE [DISTWIDTH] IN BLOOD BY AUTOMATED COUNT: 18.6 % (ref 11.6–15.1)
GFR SERPL CREATININE-BSD FRML MDRD: 20 ML/MIN/1.73SQ M
GLUCOSE SERPL-MCNC: 224 MG/DL (ref 65–140)
GLUCOSE SERPL-MCNC: 280 MG/DL (ref 65–140)
GLUCOSE SERPL-MCNC: 296 MG/DL (ref 65–140)
GLUCOSE SERPL-MCNC: 314 MG/DL (ref 65–140)
GLUCOSE SERPL-MCNC: 344 MG/DL (ref 65–140)
GLUCOSE SERPL-MCNC: 370 MG/DL (ref 65–140)
HCT VFR BLD AUTO: 28.8 % (ref 34.8–46.1)
HGB BLD-MCNC: 9.8 G/DL (ref 11.5–15.4)
LYMPHOCYTES # BLD AUTO: 0.48 THOUSANDS/ΜL (ref 0.6–4.47)
LYMPHOCYTES NFR BLD AUTO: 22 % (ref 14–44)
MAGNESIUM SERPL-MCNC: 2.1 MG/DL (ref 1.6–2.6)
MCH RBC QN AUTO: 39 PG (ref 26.8–34.3)
MCHC RBC AUTO-ENTMCNC: 34 G/DL (ref 31.4–37.4)
MCV RBC AUTO: 115 FL (ref 82–98)
MONOCYTES # BLD AUTO: 0.54 THOUSAND/ΜL (ref 0.17–1.22)
MONOCYTES NFR BLD AUTO: 25 % (ref 4–12)
NEUTROPHILS # BLD AUTO: 1.09 THOUSANDS/ΜL (ref 1.85–7.62)
NEUTS SEG NFR BLD AUTO: 51 % (ref 43–75)
NRBC BLD AUTO-RTO: 2 /100 WBCS
PHOSPHATE SERPL-MCNC: 2.2 MG/DL (ref 2.3–4.1)
PLATELET # BLD AUTO: 67 THOUSANDS/UL (ref 149–390)
PMV BLD AUTO: 11.2 FL (ref 8.9–12.7)
POTASSIUM SERPL-SCNC: 4.4 MMOL/L (ref 3.5–5.3)
RBC # BLD AUTO: 2.51 MILLION/UL (ref 3.81–5.12)
SODIUM SERPL-SCNC: 136 MMOL/L (ref 136–145)
WBC # BLD AUTO: 2.16 THOUSAND/UL (ref 4.31–10.16)

## 2018-05-21 PROCEDURE — 99232 SBSQ HOSP IP/OBS MODERATE 35: CPT | Performed by: INTERNAL MEDICINE

## 2018-05-21 PROCEDURE — 80048 BASIC METABOLIC PNL TOTAL CA: CPT | Performed by: INTERNAL MEDICINE

## 2018-05-21 PROCEDURE — 83735 ASSAY OF MAGNESIUM: CPT | Performed by: INTERNAL MEDICINE

## 2018-05-21 PROCEDURE — 84100 ASSAY OF PHOSPHORUS: CPT | Performed by: INTERNAL MEDICINE

## 2018-05-21 PROCEDURE — 82948 REAGENT STRIP/BLOOD GLUCOSE: CPT

## 2018-05-21 PROCEDURE — 85025 COMPLETE CBC W/AUTO DIFF WBC: CPT | Performed by: INTERNAL MEDICINE

## 2018-05-21 RX ORDER — INSULIN GLARGINE 100 [IU]/ML
6 INJECTION, SOLUTION SUBCUTANEOUS EVERY MORNING
Status: DISCONTINUED | OUTPATIENT
Start: 2018-05-21 | End: 2018-05-22

## 2018-05-21 RX ADMIN — DOCUSATE SODIUM 100 MG: 100 CAPSULE, LIQUID FILLED ORAL at 18:41

## 2018-05-21 RX ADMIN — CYANOCOBALAMIN TAB 500 MCG 1000 MCG: 500 TAB at 08:39

## 2018-05-21 RX ADMIN — INSULIN GLARGINE 6 UNITS: 100 INJECTION, SOLUTION SUBCUTANEOUS at 09:20

## 2018-05-21 RX ADMIN — CEPHALEXIN 250 MG: 250 CAPSULE ORAL at 05:07

## 2018-05-21 RX ADMIN — TAMSULOSIN HYDROCHLORIDE 0.4 MG: 0.4 CAPSULE ORAL at 18:41

## 2018-05-21 RX ADMIN — INSULIN LISPRO 4 UNITS: 100 INJECTION, SOLUTION INTRAVENOUS; SUBCUTANEOUS at 13:00

## 2018-05-21 RX ADMIN — INSULIN LISPRO 5 UNITS: 100 INJECTION, SOLUTION INTRAVENOUS; SUBCUTANEOUS at 18:42

## 2018-05-21 RX ADMIN — DIBASIC SODIUM PHOSPHATE, MONOBASIC POTASSIUM PHOSPHATE AND MONOBASIC SODIUM PHOSPHATE 2 TABLET: 852; 155; 130 TABLET ORAL at 09:20

## 2018-05-21 RX ADMIN — CALCIUM CARBONATE 500 MG (1,250 MG)-VITAMIN D3 200 UNIT TABLET 1 TABLET: at 08:39

## 2018-05-21 RX ADMIN — AMLODIPINE BESYLATE 2.5 MG: 2.5 TABLET ORAL at 08:39

## 2018-05-21 RX ADMIN — INSULIN LISPRO 5 UNITS: 100 INJECTION, SOLUTION INTRAVENOUS; SUBCUTANEOUS at 08:39

## 2018-05-21 RX ADMIN — NYSTATIN 500000 UNITS: 100000 SUSPENSION ORAL at 13:00

## 2018-05-21 RX ADMIN — ASPIRIN 81 MG: 81 TABLET, COATED ORAL at 08:39

## 2018-05-21 RX ADMIN — CEPHALEXIN 250 MG: 250 CAPSULE ORAL at 21:40

## 2018-05-21 RX ADMIN — INSULIN LISPRO 2 UNITS: 100 INJECTION, SOLUTION INTRAVENOUS; SUBCUTANEOUS at 18:41

## 2018-05-21 RX ADMIN — DOCUSATE SODIUM 100 MG: 100 CAPSULE, LIQUID FILLED ORAL at 08:39

## 2018-05-21 RX ADMIN — INSULIN LISPRO 4 UNITS: 100 INJECTION, SOLUTION INTRAVENOUS; SUBCUTANEOUS at 21:41

## 2018-05-21 RX ADMIN — NYSTATIN 500000 UNITS: 100000 SUSPENSION ORAL at 18:41

## 2018-05-21 RX ADMIN — NYSTATIN 500000 UNITS: 100000 SUSPENSION ORAL at 21:41

## 2018-05-21 RX ADMIN — INSULIN LISPRO 2 UNITS: 100 INJECTION, SOLUTION INTRAVENOUS; SUBCUTANEOUS at 13:01

## 2018-05-21 RX ADMIN — CEPHALEXIN 250 MG: 250 CAPSULE ORAL at 15:03

## 2018-05-21 NOTE — ASSESSMENT & PLAN NOTE
Due to significant metastatic disease and renal disease and possible chemotherapy?   Continue with follow-up as outpatient with Oncology, family to discuss goal of care with Dr Raghavendra Pham  If no further aggressive option for cancer, patient and family will not be against hospice level of care  For now the plan is to discussed with primary Oncology, discharged the patient to rehabilitation center with possible transition to hospice at later time  Patient evaluated by palliative care group, appreciate recommendations, code status changed from full code to DNR level 3  CBC remained stable

## 2018-05-21 NOTE — PROGRESS NOTES
Notified Dr Olivia Hansen of low urine output today  No new orders at this time, continue to encourage PO intake

## 2018-05-21 NOTE — CASE MANAGEMENT
Continued Stay Review    Date: 2018    Vital Signs: Temp (24hrs), Av 7 °F (36 5 °C), Min:97 5 °F (36 4 °C), Max:98 °F (36 7 °C)     HR:  [] 101  Resp:  [18-20] 18  BP: (115-117)/(56-60) 117/56  SpO2:  [94 %-99 %] 94 %  Body mass index is 28 29 kg/m²         Medications:   Scheduled Meds:   Current Facility-Administered Medications:  acetaminophen 650 mg Oral Q6H PRN 5/20 x 1   amLODIPine 2 5 mg Oral Daily    aspirin 81 mg Oral Daily    calcium carbonate 1,000 mg Oral Daily PRN    calcium carbonate-vitamin D 1 tablet Oral Daily    cephalexin 250 mg Oral Q8H [address] Started on    cyanocobalamin 1,000 mcg Oral Daily    docusate sodium 100 mg Oral BID    insulin glargine 6 Units Subcutaneous QAM    insulin lispro 1-5 Units Subcutaneous HS    insulin lispro 1-6 Units Subcutaneous TID AC    insulin lispro 2 Units Subcutaneous TID With Meals    nystatin 500,000 Units Swish & Swallow 4x Daily    phenol 1 spray Mouth/Throat Q2H PRN    tamsulosin 0 4 mg Oral Daily With Dinner    Rocephin  1000 mg  Intravenous QD D/c'd  Am   Last dose        Abnormal Labs/Diagnostic Results:       Ref Range & Units 18 0615 18 0456 18 0707 18 0824 18 0008   WBC 4 31 - 10 16 Thousand/uL 2 16   3 48   4 21   3 27CM      RBC 3 81 - 5 12 Million/uL 2 51   2 28   2 35   2 60CM      Hemoglobin 11 5 - 15 4 g/dL 9 8   8 9   9 2   10 2CM      Hematocrit 34 8 - 46 1 % 28 8   26 1   27 2   29 5CM      MCV 82 - 98 fL 115   115   116   114CM      MCH 26 8 - 34 3 pg 39 0   39 0   39 1   39  2CM      MCHC 31 4 - 37 4 g/dL 34 0  34 1  33 8  34  6CM     RDW 11 6 - 15 1 % 18 6   18 8   18 4   17  8CM      MPV 8 9 - 12 7 fL 11 2  12 1  10 1  10 5CM  9 7    Platelets 637 - 282 Thousands/uL 67   54   58   58CM   55     nRBC /100 WBCs 2  1  1      Neutrophils Relative 43 - 75 % 51  65       Lymphocytes Relative 14 - 44 % 22  19       Monocytes Relative 4 - 12 % 25   15        Eosinophils Relative 0 - 6 % 1  0 Basophils Relative 0 - 1 % 1  1       Neutrophils Absolute 1 85 - 7 62 Thousands/µL 1 09   2 26       Lymphocytes Absolute 0 60 - 4 47 Thousands/µL 0 48   0 65       Monocytes Absolute 0 17 - 1 22 Thousand/µL 0 54  0 52       Eosinophils Absolute 0 00 - 0 61 Thousand/µL 0 01  0 01       Basophils Absolute 0 00 - 0 10 Thousands/µL 0 02  0 02       Immat GRANS %         Immature Grans Absolute                    Ref Range & Units 5/21/18 0615 5/20/18 0913 5/19/18 0707 5/18/18 0727 5/17/18 0711   Sodium 136 - 145 mmol/L 136  137  137  138  139    Potassium 3 5 - 5 3 mmol/L 4 4  4 0  4 7  4 5CM  4 2    Chloride 100 - 108 mmol/L 103  103  105  106  104    CO2 21 - 32 mmol/L 27  27  27  25  26    Anion Gap 4 - 13 mmol/L 6  7  5  7  9    BUN 5 - 25 mg/dL 48   49   44   44   48     Creatinine 0 60 - 1 30 mg/dL 2 12   2 26CM   2 06CM   1 86CM   2  11CM     Glucose 65 - 140 mg/dL 280   198CM   151CM   145CM   157CM     Calcium 8 3 - 10 1 mg/dL 9 5  9 2  9 0  9 0  9 4    eGFR ml/min/1 73sq m 20  19  21  23  20            Phos 2 2  Age/Sex: 80 y o  female     Assessment/Plan:   Progress note 5/20  Acute urinary retention   Assessment & Plan     Patient in need of straight catheterization for 3 attempt  As per protocol Eaton catheter was placed  Continue with Eaton care  Continue with Flomax  Voiding trial at rehabilitation in 5-7 days          Contusion   Assessment & Plan     Of multiple body parts expression early forehead, upper extremities, lower extremities  Multiple skin tears present on admission  Appreciated wound care consult and recommendations for local care  Fall precautions in place  PT and OT as able  For rehabilitation upon discharge  Supportive care          Head contusion   Assessment & Plan     Slowly improving, supportive care          Type 2 diabetes mellitus without complication, with long-term current use of insulin (HCC)   Assessment & Plan     Continue with insulin at the current dose and monitoring             Bilateral lower extremity edema   Assessment & Plan     - Severe B/L lower extremity pitting edema in pt with hx of metastatic cancer and hypalbuminemia  - Prior admission venous duplex performed negative for dvt   - improved with elevation of the lower extremity  - Continue with supportive care  - Might use compression p r n           Pancytopenia ([facility] [address] )   Assessment & Plan     Due to significant metastatic disease and renal disease and possible chemotherapy? Continue with follow-up as outpatient with Oncology, family to discuss goal of care with Dr Michael Navarro  If no further aggressive option for cancer, patient and family will not be against hospice level of care  For now the plan is to discussed with primary Oncology, discharged the patient to rehabilitation center with possible transition to hospice at later time  Patient evaluated by palliative care group, appreciate recommendations, code status changed from full code to DNR level 3  CBC remained stable today          Liver metastases ([facility] [address] )   Assessment & Plan     Elevated LFT secondary to known liver metastasis  Supportive care  LFTs p r n                 * Syncope   Assessment & Plan     Possible mechanical fall versus true syncope?   As far workup only remarkable for possible urinary tract infection which might have contributed to patient mechanical fall/syncope  Urine culture with E coli sensitive to most common antibiotics, discontinue Rocephin, started Keflex for another 4 days  Fall precautions  PT and OT  Rehabilitation upon discharge             Discharge Plan: Rehab

## 2018-05-21 NOTE — SOCIAL WORK
CM spoke with pt's dtr Bg Connor to discuss d/c plan--to get additional SNF choices if  SNF is unable to accept  Pt's dtr requesting additional referral to [facility] SNF  Referral made via [facility]

## 2018-05-21 NOTE — PROGRESS NOTES
Progress Note - Dale Stevenson 7/9/1927, 80 y o  female MRN: [id number]    Unit/Bed#: [facility] 635-07 Encounter: [id number]    Primary Care Provider: Paulo Dolan MD   Date and time admitted to hospital: 5/16/2018  5:08 PM        Candida, oral   Assessment & Plan    Started nystatin oral solution today, continue for a total of 7-10 days        Acute urinary retention   Assessment & Plan    Patient in need of straight catheterization for 3 attempt  As per protocol Eaton catheter was placed  Continue with Eaton care  Continue with Flomax  Voiding trial at rehabilitation in 5-7 days        Contusion   Assessment & Plan    Of multiple body parts expression early forehead, upper extremities, lower extremities  Multiple skin tears present on admission  Appreciated wound care consult and recommendations for local care  Fall precautions in place  PT and OT as able  For rehabilitation upon discharge  Supportive care        Head contusion   Assessment & Plan    Slowly improving, supportive care        Type 2 diabetes mellitus without complication, with long-term current use of insulin (HCC)   Assessment & Plan    Start Lantus 6 units in a m  Start Humalog 2 units t i d  with meals  Continue with insulin sliding scale  Monitor          Bilateral lower extremity edema   Assessment & Plan    - Severe B/L lower extremity pitting edema in pt with hx of metastatic cancer and hypalbuminemia  - Prior admission venous duplex performed negative for dvt   - improved with elevation of the lower extremity  - Continue with supportive care  - Might use compression p r n  Pancytopenia ([address] )   Assessment & Plan    Due to significant metastatic disease and renal disease and possible chemotherapy?   Continue with follow-up as outpatient with Oncology, family to discuss goal of care with Dr Fernando Jackson  If no further aggressive option for cancer, patient and family will not be against hospice level of care  For now the plan is to discussed with primary Oncology, discharged the patient to rehabilitation center with possible transition to hospice at later time  Patient evaluated by palliative care group, appreciate recommendations, code status changed from full code to DNR level 3  CBC remained stable         Liver metastases ([address] )   Assessment & Plan    Elevated LFT secondary to known liver metastasis  Supportive care  LFTs p r n  * Syncope   Assessment & Plan    Possible mechanical fall versus true syncope? As far workup only remarkable for possible urinary tract infection which might have contributed to patient mechanical fall/syncope  Urine culture with E coli sensitive to most common antibiotics, discontinue Rocephin, started Keflex for another 3 days  Fall precautions  PT and OT  Rehabilitation upon discharge           VTE Pharmacologic Prophylaxis: no  Pharmacologic: Thrombocytopenia  Mechanical VTE Prophylaxis in Place: Yes     Patient Centered Rounds: I have performed bedside rounds with nursing staff today      Discussions with Specialists or Other Care Team Provider:  None      Education and Discussions with Family / Patient:  Patient, daughter     Time Spent for Care: 25 minutes   More than 50% of total time spent on counseling and coordination of care as described above      Current Length of Stay: 5 day(s)     Current Patient Status: Inpatient   Certification Statement: The patient will continue to require additional inpatient hospital stay due to Please refer to above     Discharge Plan:   Rehabilitation pending bed availability      Code Status: Level 3 - DNAR and DNI    Subjective:   Patient is feeling well today  She has no complaints for me  Objective:     Vitals:   Temp (24hrs), Av 2 °F (36 8 °C), Min:97 6 °F (36 4 °C), Max:98 7 °F (37 1 °C)    HR:  [] 97  Resp:  [18] 18  BP: (104-112)/(56-74) 104/65  SpO2:  [97 %-99 %] 99 %  Body mass index is 28 32 kg/m²       Input and Output Summary (last 24 hours): Intake/Output Summary (Last 24 hours) at 05/21/18 1524  Last data filed at 05/21/18 1430   Gross per 24 hour   Intake             1005 ml   Output              635 ml   Net              370 ml       Physical Exam:     Physical Exam   Constitutional: She is oriented to person, place, and time  She appears well-developed  Cardiovascular: Normal rate, regular rhythm and normal heart sounds  Exam reveals no friction rub  No murmur heard  Pulmonary/Chest: Effort normal  No respiratory distress  She has no wheezes  She has no rales  Abdominal: Soft  She exhibits no distension  There is no tenderness  There is no rebound  Musculoskeletal: She exhibits no edema  Neurological: She is alert and oriented to person, place, and time  She exhibits normal muscle tone  Skin:   Multiple upper lower extremity bruises, bruise of the forehead, bruises at the buttocks bilaterally       Additional Data:     Labs:      Results from last 7 days  Lab Units 05/21/18  0615   WBC Thousand/uL 2 16*   HEMOGLOBIN g/dL 9 8*   HEMATOCRIT % 28 8*   PLATELETS Thousands/uL 67*   NEUTROS PCT % 51   LYMPHS PCT % 22   MONOS PCT % 25*   EOS PCT % 1       Results from last 7 days  Lab Units 05/21/18  0615 05/20/18  0913   SODIUM mmol/L 136 137   POTASSIUM mmol/L 4 4 4 0   CHLORIDE mmol/L 103 103   CO2 mmol/L 27 27   BUN mg/dL 48* 49*   CREATININE mg/dL 2 12* 2 26*   CALCIUM mg/dL 9 5 9 2   TOTAL PROTEIN g/dL  --  4 8*   BILIRUBIN TOTAL mg/dL  --  3 97*   ALK PHOS U/L  --  278*   ALT U/L  --  56   AST U/L  --  98*   GLUCOSE RANDOM mg/dL 280* 198*       Results from last 7 days  Lab Units 05/18/18  0008   INR  1 37*       * I Have Reviewed All Lab Data Listed Above  * Additional Pertinent Lab Tests Reviewed:  All Labs Within Last 24 Hours Reviewed    Imaging:    Imaging Reports Reviewed Today Include:  None  Imaging Personally Reviewed by Myself Includes:  None    Recent Cultures (last 7 days):       Results from last 7 days  Lab Units 05/16/18  2303   URINE CULTURE  >100,000 cfu/ml Escherichia coli*       Last 24 Hours Medication List:     Current Facility-Administered Medications:  acetaminophen 650 mg Oral Q6H PRN Crissy Ann MD   amLODIPine 2 5 mg Oral Daily KISHORE Schroeder   aspirin 81 mg Oral Daily KISHORE Schroeder   calcium carbonate 1,000 mg Oral Daily PRN KISHORE Schroeder   calcium carbonate-vitamin D 1 tablet Oral Daily KISHORE Schroeder   cephalexin 250 mg Oral Q8H [address] Crissy Ann MD   cyanocobalamin 1,000 mcg Oral Daily KISHORE Schroeder   docusate sodium 100 mg Oral BID KISHORE Schroeder   insulin glargine 6 Units Subcutaneous QAM Crissy Ann MD   insulin lispro 1-5 Units Subcutaneous HS KISHORE Schroeder   insulin lispro 1-6 Units Subcutaneous TID AC KISHORE Bettencourt   insulin lispro 2 Units Subcutaneous TID With Meals Crissy Ann MD   nystatin 500,000 Units Swish & Swallow 4x Daily Crissy Ann MD   phenol 1 spray Mouth/Throat Q2H PRN Crissy Ann MD   tamsulosin 0 4 mg Oral Daily With Ashvin Johnson MD        Today, Patient Was Seen By: Crissy Ann MD    ** Please Note: Dragon 360 Dictation voice to text software may have been used in the creation of this document   **

## 2018-05-21 NOTE — ASSESSMENT & PLAN NOTE
Possible mechanical fall versus true syncope?   As far workup only remarkable for possible urinary tract infection which might have contributed to patient mechanical fall/syncope  Urine culture with E coli sensitive to most common antibiotics, discontinue Rocephin, started Keflex for another 3 days  Fall precautions  PT and OT  Rehabilitation upon discharge

## 2018-05-21 NOTE — ASSESSMENT & PLAN NOTE
Start Lantus 6 units in a m    Start Humalog 2 units t i d  with meals  Continue with insulin sliding scale  Monitor

## 2018-05-22 VITALS
WEIGHT: 169.09 LBS | SYSTOLIC BLOOD PRESSURE: 110 MMHG | RESPIRATION RATE: 18 BRPM | HEIGHT: 66 IN | TEMPERATURE: 98.1 F | OXYGEN SATURATION: 97 % | BODY MASS INDEX: 27.18 KG/M2 | HEART RATE: 92 BPM | DIASTOLIC BLOOD PRESSURE: 55 MMHG

## 2018-05-22 PROBLEM — R55 SYNCOPE: Status: RESOLVED | Noted: 2018-05-07 | Resolved: 2018-05-22

## 2018-05-22 LAB
GLUCOSE SERPL-MCNC: 190 MG/DL (ref 65–140)
GLUCOSE SERPL-MCNC: 249 MG/DL (ref 65–140)
GLUCOSE SERPL-MCNC: 264 MG/DL (ref 65–140)
GLUCOSE SERPL-MCNC: 286 MG/DL (ref 65–140)

## 2018-05-22 PROCEDURE — 82948 REAGENT STRIP/BLOOD GLUCOSE: CPT

## 2018-05-22 PROCEDURE — 99239 HOSP IP/OBS DSCHRG MGMT >30: CPT | Performed by: INTERNAL MEDICINE

## 2018-05-22 RX ORDER — INSULIN GLARGINE 100 [IU]/ML
15 INJECTION, SOLUTION SUBCUTANEOUS EVERY MORNING
Status: DISCONTINUED | OUTPATIENT
Start: 2018-05-23 | End: 2018-05-22 | Stop reason: HOSPADM

## 2018-05-22 RX ORDER — CEPHALEXIN 250 MG/1
250 CAPSULE ORAL EVERY 8 HOURS SCHEDULED
Refills: 0
Start: 2018-05-22 | End: 2018-05-24

## 2018-05-22 RX ADMIN — INSULIN LISPRO 2 UNITS: 100 INJECTION, SOLUTION INTRAVENOUS; SUBCUTANEOUS at 09:34

## 2018-05-22 RX ADMIN — AMLODIPINE BESYLATE 2.5 MG: 2.5 TABLET ORAL at 09:33

## 2018-05-22 RX ADMIN — TAMSULOSIN HYDROCHLORIDE 0.4 MG: 0.4 CAPSULE ORAL at 17:12

## 2018-05-22 RX ADMIN — ASPIRIN 81 MG: 81 TABLET, COATED ORAL at 09:33

## 2018-05-22 RX ADMIN — CEPHALEXIN 250 MG: 250 CAPSULE ORAL at 13:00

## 2018-05-22 RX ADMIN — NYSTATIN 500000 UNITS: 100000 SUSPENSION ORAL at 12:59

## 2018-05-22 RX ADMIN — NYSTATIN 500000 UNITS: 100000 SUSPENSION ORAL at 09:33

## 2018-05-22 RX ADMIN — NYSTATIN 500000 UNITS: 100000 SUSPENSION ORAL at 17:12

## 2018-05-22 RX ADMIN — INSULIN LISPRO 2 UNITS: 100 INJECTION, SOLUTION INTRAVENOUS; SUBCUTANEOUS at 12:59

## 2018-05-22 RX ADMIN — CEPHALEXIN 250 MG: 250 CAPSULE ORAL at 05:37

## 2018-05-22 RX ADMIN — INSULIN LISPRO 3 UNITS: 100 INJECTION, SOLUTION INTRAVENOUS; SUBCUTANEOUS at 09:33

## 2018-05-22 RX ADMIN — INSULIN GLARGINE 6 UNITS: 100 INJECTION, SOLUTION SUBCUTANEOUS at 09:34

## 2018-05-22 RX ADMIN — DOCUSATE SODIUM 100 MG: 100 CAPSULE, LIQUID FILLED ORAL at 17:12

## 2018-05-22 RX ADMIN — DOCUSATE SODIUM 100 MG: 100 CAPSULE, LIQUID FILLED ORAL at 09:33

## 2018-05-22 RX ADMIN — CYANOCOBALAMIN TAB 500 MCG 1000 MCG: 500 TAB at 09:33

## 2018-05-22 RX ADMIN — CALCIUM CARBONATE 500 MG (1,250 MG)-VITAMIN D3 200 UNIT TABLET 1 TABLET: at 09:33

## 2018-05-22 NOTE — DISCHARGE SUMMARY
Discharge Summary - [facility] Internal Medicine    Patient Information: Heber Horton 80 y o  female MRN: [id number]  Unit/Bed#: [facility] 792-35 Encounter: [id number]    Discharging Physician / Practitioner: Mehnaz Trinidad DO  PCP: Ananya Cole MD  Admission Date: 5/16/2018  Discharge Date: 05/22/18    Disposition:     Other: Lottie Gould    Reason for Admission:   Ambulatory dysfunction and fall  UTI    Discharge Diagnoses:     Principal Problem (Resolved):    Syncope  Active Problems:    Liver metastases ([address] )    Pancytopenia ([address] )    Bilateral lower extremity edema    Type 2 diabetes mellitus without complication, with long-term current use of insulin ([facility] [address] )    Head contusion    Contusion    Acute urinary retention    Candida, oral      Consultations During Hospital Stay:  · Palliative care  · Wound care  · Oncology    Procedures Performed:     · Abdomen pelvis CT scan  1   Emphysematous cystitis  2   Worsening osseous metastatic disease throughout the axial skeleton with mild to moderate ascites in the abdomen and pelvis  3   Stable splenic lesions likely benign cysts and/or hemangiomas   Previously described liver metastases inconspicuous on this unenhanced study  Head CT scan  1   No acute intracranial abnormality     2   Frontal scalp hematoma with no underlying skull fracture   Stable lytic skull metastases      Cervical spine CT scan no fracture  Chest x-ray with bilateral atelectasis  Significant Findings / Test Results:     · As above    Incidental Findings:   · none    Test Results Pending at Discharge (will require follow up):   · none     Outpatient Tests Requested:  · none    Complications:  Urinary retention requiring Eaton catheter    Hospital Course:     Heber Horton is a 80 y o  female patient who originally presented to the hospital on 5/16/2018 due to ambulate dysfunction and fall, she was found down by nursing staff at the nursing home  Was found to have multiple ecchymotic area over the entire body , Left elbow skin tear laceration with 2 sutures placed in ER, and frontal scalp hematoma  Patient was admitted to the hospital, workup was positive for UTI, she was started on antibiotic    1  Ambulatory dysfunction/fall with bilateral lower extremities ecchymosis, PT recommending rehab  2  Left elbow skin tear secondary to fall, status post 2 sutures placed in  ER, suture removal in 4 days  3  UTI/urinary retention,  patient developed urinary retention while in the hospital, Eaton catheter was inserted recommendation for voiding trial in 5 days, continue Eaton catheter, continue oral Keflex x2 more days  4  Breast cancer stage IV with liver, bone, lung metastases, seen by in [facility] recommendation for outpatient follow-up possible transition to hospice at later time  5  Pancytopenia s/p  filgrastim   6  Dementia  7  Lower extremity edema, negative venous Doppler suspect secondary to   hypoalbumin,  discontinue Norvasc  8  Diabetes mellitus type 2 with hyperglycemia, continue insulin  9  Transaminitis, jaundice,  secondary to liver metastases  10  Oral thrush on nystatin  11  Chronic kidney disease stage 4  12    Hypertension    Patient with poor prognosis  She will be discharged today to [facility]  Follow up with PCP and with Dr Qing Newman as an outpatient    Condition at Discharge: poor     Discharge Day Visit / Exam:     Subjective:    Patient seen and examined  Comfortable in bed  Denied pain  Vitals: Blood Pressure: 119/57 (05/22/18 0706)  Pulse: 99 (05/22/18 0706)  Temperature: 97 9 °F (36 6 °C) (05/22/18 0706)  Temp Source: Oral (05/22/18 0706)  Respirations: 20 (05/22/18 0706)  Height: 5' 6" (167 6 cm) (05/17/18 1744)  Weight - Scale: 76 7 kg (169 lb 1 5 oz) (05/22/18 0559)  SpO2: 98 % (05/22/18 0706)  Exam:   Physical Exam  Patient is awake no acute distress  Skin Jaundice  Lung clear to auscultation bilateral  Heart positive S1-S2 no murmur  Abdomen soft nontender positive bowel sounds  Trace lower extremity edema with multiple bruises  Discussion with Family:  Patient's daughter Anthony Gore    Discharge instructions/Information to patient and family:   See after visit summary for information provided to patient and family  Provisions for Follow-Up Care:  See after visit summary for information related to follow-up care and any pertinent home health orders  Planned Readmission: no     Discharge Statement:  I spent 40  minutes discharging the patient  This time was spent on the day of discharge  I had direct contact with the patient on the day of discharge  Greater than 50% of the total time was spent examining patient, answering all patient questions, arranging and discussing plan of care with patient as well as directly providing post-discharge instructions  Additional time then spent on discharge activities  Discharge Medications:  See after visit summary for reconciled discharge medications provided to patient and family        ** Please Note: This note has been constructed using a voice recognition system **

## 2018-05-22 NOTE — MEDICAL STUDENT
Progress Note - Gita Matta 80 y o  female MRN: [id number]    Unit/Bed#: [facility] 523-16 Encounter: [id number]      Assessment:  Ms Malika Villafana is a 81 YO F who presented to the ED following a fall  1  Ambulatory dysfunction  2  Pancytopenia  3  Acute urinary retention  4  Contusions  5  T2DM  6  Lower extremity edema    Plan:    1  Cardiac and neurological workup negative for syncope  2  UTI possible etiology for mechanical fall, continue Keflex for another 2 days  3  Plan for d/c to acute rehabilitation facility when medically stable  4  Pancytopenia likely secondary to either advanced metastatic disease or chemotherapy  5  If CBC stable, plan to f/u outpatient with oncology in 1-2 weeks to assess status  6  Eaton catheter in place; maintain for 5-7 days before attempting voiding trial in rehab facility  7  Contusions healing, patient complains of no pain  8  Continue outpatient insulin treatment for DM, consider increasing insulin dose due to recent hyperglycemia  9  LE edema likely secondary to decreased liver synthesis due to liver metastases; continue symptomatic management    Subjective:     Teja Anderson is a 81 YO F who presented to the ED following a fall  PMH is significant for advanced metastatic cancer, with origin in the breast but metastases to lungs liver and bones  She lives in an half-way; per staff, she was found down  On admission, she had no recollection of the fall and was worked up for cardiac and neurological causes  Both work ups were negative, and a urinary culture grew gram negative bacteria  It's felt that a UTI is the most likely cause of the mechanical fall, and the patient is currently being treated with Keflex for an additional two days  During admission, the patient was found to be pancytopenic  This is thought to be due to either advanced metastatic disease or chemotherapy  In either case, she should follow with her oncologist as an outpatient in 1-2 weeks following discharge        Objective: Vitals: Blood pressure 119/57, pulse 99, temperature 97 9 °F (36 6 °C), temperature source Oral, resp  rate 20, height 5' 6" (1 676 m), weight 76 7 kg (169 lb 1 5 oz), SpO2 98 %, not currently breastfeeding  ,Body mass index is 27 29 kg/m²  Intake/Output Summary (Last 24 hours) at 05/22/18 0953  Last data filed at 05/22/18 1695   Gross per 24 hour   Intake              725 ml   Output              460 ml   Net              265 ml       Physical Exam: General appearance: alert and oriented, in no acute distress  Head: Normocephalic, without obvious abnormality, atraumatic  Neck: no adenopathy, no JVD, supple, symmetrical, trachea midline and thyroid not enlarged, symmetric, no tenderness/mass/nodules  Lungs: clear to auscultation bilaterally  Heart: regular rate and rhythm, S1, S2 normal, no murmur, click, rub or gallop  Abdomen: soft, non-tender; bowel sounds normal; no masses,  no organomegaly  Extremities: extremities normal, warm and well-perfused; no cyanosis, clubbing, or edema     Invasive Devices     Peripheral Intravenous Line            Peripheral IV 05/20/18 Left Hand 1 day          Drain            Urethral Catheter 16 Fr  3 days                Lab, Imaging and other studies: I have personally reviewed pertinent reports       Lab Results   Component Value Date    WBC 2 16 (L) 05/21/2018    HGB 9 8 (L) 05/21/2018    HCT 28 8 (L) 05/21/2018     (H) 05/21/2018    PLT 67 (L) 05/21/2018     Lab Results   Component Value Date    GLUCOSE 280 (H) 05/21/2018    CALCIUM 9 5 05/21/2018     05/21/2018    K 4 4 05/21/2018    CO2 27 05/21/2018     05/21/2018    BUN 48 (H) 05/21/2018    CREATININE 2 12 (H) 05/21/2018     Vitals:    05/22/18 0706   BP: 119/57   Pulse: 99   Resp: 20   Temp: 97 9 °F (36 6 °C)   SpO2: 98%       VTE Pharmacologic Prophylaxis: Reason for no pharmacologic prophylaxis thrombocytopenia  VTE Mechanical Prophylaxis: sequential compression device

## 2018-05-22 NOTE — SOCIAL WORK
SNF unable to accept pt  Pt accepted at [facility]  Pt's dtr agreeable with d/c to [place] today  Transport arranged via [place] [facility] at [address] to [facility]  Pt's dtr, bedside RN, and [address] [place] notified of transport time  Chart copy requested  Transfer to facility and CMN forms completed  F/u IMM signed by pt's dtr

## 2018-05-23 ENCOUNTER — TELEPHONE (OUTPATIENT)
Dept: HEMATOLOGY ONCOLOGY | Facility: CLINIC | Age: 83
End: 2018-05-23

## 2018-05-23 NOTE — TELEPHONE ENCOUNTER
Sirena VILLAFUERTE  with new [facility] home called and would like to know if pt is currently on Ibrance 100mg daily? She wants to know her schedule  She doesn't know when she is to be on it considering she is 7 days on and 21 days off  Please call her back with instructions

## 2018-05-31 ENCOUNTER — TELEPHONE (OUTPATIENT)
Dept: HEMATOLOGY ONCOLOGY | Facility: CLINIC | Age: 83
End: 2018-05-31

## 2018-05-31 NOTE — TELEPHONE ENCOUNTER
Dr Yudi Knox reviewed patient's 5/30/18 CBC results  Patient is ok to restart Ibrance 100 mg PO for 21 days, followed by 7 days off  Called back [facility] with [facility] and made her aware

## 2018-06-01 ENCOUNTER — TELEPHONE (OUTPATIENT)
Dept: HEMATOLOGY ONCOLOGY | Facility: CLINIC | Age: 83
End: 2018-06-01

## 2018-06-01 NOTE — TELEPHONE ENCOUNTER
Calls reporting Joanie De León is now at a Nursing home following a fall and hospitalization  Not sure if she will be returning to assisted living area  Had another fall last night  Very weak  Has questions about future treatments  Wishes to speak with Oriana-especially about Xgeva and faslodex

## 2018-06-01 NOTE — TELEPHONE ENCOUNTER
Called and spoke to Aba, the patient's daughter, in regards to her mother's recent multiple falls  Currently the patient is very weak and not able to stand  Patient started her next Ibrance cycle yesterday and is scheduled for her next Faslodex treatment on 6/12/18  Aba will be calling the office back on 6/11/18 to let us know if the patient is well enough for her Faslodex injection

## 2018-06-04 ENCOUNTER — TELEPHONE (OUTPATIENT)
Dept: HEMATOLOGY ONCOLOGY | Facility: CLINIC | Age: 83
End: 2018-06-04

## 2018-06-04 NOTE — TELEPHONE ENCOUNTER
Says her mother is now in a Nursing Home-moved in 2 weeks ago  She wants to talk to Joey Liang regarding future orders and plans  Does not seem that she will be able to return to assisted living

## 2018-06-04 NOTE — TELEPHONE ENCOUNTER
Called and spoke to the patient's daughter, Yair Russell reported that her mother peacefully passed this morning

## 2022-11-12 NOTE — ASSESSMENT & PLAN NOTE
B/P 175/73 · POA, symmetrical edema roughly 2-3+ bilaterally on admission  History of hypercoagulability given cancer history  Albumin noted to be low  Legs are non-tender to palpation     · venous doppler--> no e/o DVT   · Compression stockings  · Elevate extremity

## 2023-04-03 NOTE — PLAN OF CARE
----- Message from Coleman Witt DO sent at 4/2/2023 11:44 AM CDT -----  Notify patient cholesterol unchanged from 1 year ago.  At 222.  Triglycerides improved down to 72.  HDL(good cholesterol) higher at 95.  LDL(bad cholesterol) improved down to 113.   DISCHARGE PLANNING - CARE MANAGEMENT     Discharge to post-acute care or home with appropriate resources Progressing        Nutrition/Hydration-ADULT     Nutrient/Hydration intake appropriate for improving, restoring or maintaining nutritional needs Progressing        Potential for Falls     Patient will remain free of falls Progressing        Prexisting or High Potential for Compromised Skin Integrity     Skin integrity is maintained or improved Progressing